# Patient Record
Sex: MALE | Race: WHITE | Employment: OTHER | URBAN - NONMETROPOLITAN AREA
[De-identification: names, ages, dates, MRNs, and addresses within clinical notes are randomized per-mention and may not be internally consistent; named-entity substitution may affect disease eponyms.]

---

## 2017-01-14 PROBLEM — K63.2 COLOCUTANEOUS FISTULA: Status: ACTIVE | Noted: 2017-01-14

## 2017-01-14 PROBLEM — L02.211 ABDOMINAL WALL ABSCESS: Status: ACTIVE | Noted: 2017-01-14

## 2017-01-14 PROBLEM — D64.9 ANEMIA: Chronic | Status: ACTIVE | Noted: 2017-01-14

## 2017-01-14 PROBLEM — R53.81 PHYSICAL DECONDITIONING: Chronic | Status: ACTIVE | Noted: 2017-01-14

## 2017-01-17 PROBLEM — R41.0 CONFUSION: Status: ACTIVE | Noted: 2017-01-17

## 2017-01-17 PROBLEM — G47.00 INSOMNIA: Status: ACTIVE | Noted: 2017-01-17

## 2017-01-17 PROBLEM — I25.10 CORONARY ARTERY DISEASE INVOLVING NATIVE CORONARY ARTERY OF NATIVE HEART: Chronic | Status: ACTIVE | Noted: 2017-01-17

## 2017-01-17 PROBLEM — K56.7 ILEUS (HCC): Status: ACTIVE | Noted: 2017-01-17

## 2017-01-17 PROBLEM — E83.39 HYPOPHOSPHATEMIA: Status: ACTIVE | Noted: 2017-01-17

## 2017-01-17 PROBLEM — N17.9 ACUTE KIDNEY INJURY (HCC): Status: ACTIVE | Noted: 2017-01-17

## 2017-01-17 PROBLEM — I10 ESSENTIAL HYPERTENSION: Chronic | Status: ACTIVE | Noted: 2017-01-17

## 2017-01-17 PROBLEM — G93.1 ANOXIC BRAIN INJURY (HCC): Chronic | Status: ACTIVE | Noted: 2017-01-17

## 2017-01-17 PROBLEM — E87.1 HYPONATREMIA: Status: ACTIVE | Noted: 2017-01-17

## 2017-01-17 PROBLEM — I50.32 DIASTOLIC DYSFUNCTION WITH CHRONIC HEART FAILURE (HCC): Chronic | Status: ACTIVE | Noted: 2017-01-17

## 2017-01-24 PROBLEM — G93.1 ANOXIC BRAIN INJURY (HCC): Chronic | Status: RESOLVED | Noted: 2017-01-17 | Resolved: 2017-01-24

## 2017-01-25 PROBLEM — F03.90 DEMENTIA (HCC): Status: ACTIVE | Noted: 2017-01-25

## 2017-01-25 PROBLEM — R45.1 AGITATION: Status: ACTIVE | Noted: 2017-01-25

## 2017-02-02 ENCOUNTER — TELEPHONE (OUTPATIENT)
Age: 82
End: 2017-02-02

## 2017-02-09 ENCOUNTER — OFFICE VISIT (OUTPATIENT)
Dept: UROLOGY | Age: 82
End: 2017-02-09

## 2017-02-09 ENCOUNTER — TELEPHONE (OUTPATIENT)
Dept: UROLOGY | Age: 82
End: 2017-02-09

## 2017-02-09 VITALS — BODY MASS INDEX: 27.6 KG/M2 | WEIGHT: 150 LBS | HEIGHT: 62 IN

## 2017-02-09 DIAGNOSIS — R33.9 RETENTION OF URINE: Primary | ICD-10-CM

## 2017-02-09 PROCEDURE — 99214 OFFICE O/P EST MOD 30 MIN: CPT | Performed by: UROLOGY

## 2017-02-09 PROCEDURE — 51700 IRRIGATION OF BLADDER: CPT | Performed by: UROLOGY

## 2017-02-09 ASSESSMENT — ENCOUNTER SYMPTOMS
EYE PAIN: 0
FACIAL SWELLING: 0
COLOR CHANGE: 0
VOMITING: 0
BACK PAIN: 0
CONSTIPATION: 0
EYE REDNESS: 0
SHORTNESS OF BREATH: 0
CHEST TIGHTNESS: 0

## 2017-02-20 ENCOUNTER — TELEPHONE (OUTPATIENT)
Age: 82
End: 2017-02-20

## 2017-02-21 ENCOUNTER — OFFICE VISIT (OUTPATIENT)
Age: 82
End: 2017-02-21

## 2017-02-21 VITALS
DIASTOLIC BLOOD PRESSURE: 68 MMHG | WEIGHT: 152 LBS | RESPIRATION RATE: 16 BRPM | BODY MASS INDEX: 27.97 KG/M2 | TEMPERATURE: 97.2 F | HEIGHT: 62 IN | SYSTOLIC BLOOD PRESSURE: 112 MMHG | HEART RATE: 72 BPM | OXYGEN SATURATION: 96 %

## 2017-02-21 DIAGNOSIS — D50.0 IRON DEFICIENCY ANEMIA DUE TO CHRONIC BLOOD LOSS: Primary | ICD-10-CM

## 2017-02-21 DIAGNOSIS — Z98.890 POST-OPERATIVE STATE: ICD-10-CM

## 2017-02-21 DIAGNOSIS — K57.40 DIVERTICULITIS OF BOTH SMALL AND LARGE INTESTINE WITH PERFORATION AND ABSCESS: Primary | ICD-10-CM

## 2017-02-21 PROCEDURE — 99024 POSTOP FOLLOW-UP VISIT: CPT | Performed by: SURGERY

## 2017-02-21 RX ORDER — CIPROFLOXACIN 250 MG/1
250 TABLET, FILM COATED ORAL 2 TIMES DAILY
Qty: 20 TABLET | Refills: 0 | Status: SHIPPED | OUTPATIENT
Start: 2017-02-21 | End: 2017-02-21 | Stop reason: CLARIF

## 2017-03-26 PROBLEM — A41.9 SEPSIS (HCC): Status: ACTIVE | Noted: 2017-03-26

## 2017-03-26 PROBLEM — I21.4 NSTEMI (NON-ST ELEVATED MYOCARDIAL INFARCTION) (HCC): Status: ACTIVE | Noted: 2017-03-26

## 2017-03-26 PROBLEM — K65.9 PERITONITIS (HCC): Status: ACTIVE | Noted: 2017-03-26

## 2017-03-26 PROBLEM — R65.10 SIRS (SYSTEMIC INFLAMMATORY RESPONSE SYNDROME) (HCC): Status: ACTIVE | Noted: 2017-03-26

## 2017-03-26 PROBLEM — N41.9 PROSTATITIS: Status: ACTIVE | Noted: 2017-03-26

## 2017-03-26 PROBLEM — R07.9 CHEST PAIN: Status: ACTIVE | Noted: 2017-03-26

## 2017-04-04 PROBLEM — K65.9 PERITONITIS (HCC): Status: RESOLVED | Noted: 2017-03-26 | Resolved: 2017-04-04

## 2017-04-06 PROBLEM — R65.20 SEVERE SEPSIS (HCC): Status: ACTIVE | Noted: 2017-03-26

## 2018-06-02 ENCOUNTER — APPOINTMENT (OUTPATIENT)
Dept: CT IMAGING | Age: 83
DRG: 563 | End: 2018-06-02
Payer: MEDICARE

## 2018-06-02 ENCOUNTER — HOSPITAL ENCOUNTER (INPATIENT)
Age: 83
LOS: 2 days | Discharge: NURSING, REHAB OR LONG-TERM CARE | DRG: 563 | End: 2018-06-07
Attending: INTERNAL MEDICINE | Admitting: INTERNAL MEDICINE
Payer: MEDICARE

## 2018-06-02 ENCOUNTER — APPOINTMENT (OUTPATIENT)
Dept: GENERAL RADIOLOGY | Age: 83
DRG: 563 | End: 2018-06-02
Payer: MEDICARE

## 2018-06-02 DIAGNOSIS — S42.211A CLOSED DISPLACED FRACTURE OF SURGICAL NECK OF RIGHT HUMERUS, UNSPECIFIED FRACTURE MORPHOLOGY, INITIAL ENCOUNTER: Primary | ICD-10-CM

## 2018-06-02 PROBLEM — S42.291A HUMERUS HEAD FRACTURE, RIGHT, CLOSED, INITIAL ENCOUNTER: Status: ACTIVE | Noted: 2018-06-02

## 2018-06-02 LAB
AMORPHOUS: ABNORMAL
BACTERIA: ABNORMAL /HPF
BASOPHILS # BLD: 0.6 %
BASOPHILS ABSOLUTE: 0.1 THOU/MM3 (ref 0–0.1)
BILIRUBIN URINE: NEGATIVE
BLOOD, URINE: ABNORMAL
CASTS UA: ABNORMAL /LPF
CHARACTER, URINE: CLEAR
COLOR: YELLOW
CRYSTALS, UA: ABNORMAL
EOSINOPHIL # BLD: 0 %
EOSINOPHILS ABSOLUTE: 0 THOU/MM3 (ref 0–0.4)
EPITHELIAL CELLS, UA: ABNORMAL /HPF
GLUCOSE URINE: NEGATIVE MG/DL
HCT VFR BLD CALC: 39 % (ref 42–52)
HEMOGLOBIN: 12.9 GM/DL (ref 14–18)
KETONES, URINE: NEGATIVE
LEUKOCYTE ESTERASE, URINE: NEGATIVE
LYMPHOCYTES # BLD: 16.7 %
LYMPHOCYTES ABSOLUTE: 1.6 THOU/MM3 (ref 1–4.8)
MCH RBC QN AUTO: 28.9 PG (ref 27–31)
MCHC RBC AUTO-ENTMCNC: 33.1 GM/DL (ref 33–37)
MCV RBC AUTO: 87.3 FL (ref 80–94)
MONOCYTES # BLD: 5.4 %
MONOCYTES ABSOLUTE: 0.5 THOU/MM3 (ref 0.4–1.3)
MUCUS: ABNORMAL
NITRITE, URINE: NEGATIVE
NUCLEATED RED BLOOD CELLS: 0 /100 WBC
PDW BLD-RTO: 13 % (ref 11.5–14.5)
PH UA: 6
PLATELET # BLD: 189 THOU/MM3 (ref 130–400)
PLATELET ESTIMATE: ADEQUATE
PMV BLD AUTO: 8.6 FL (ref 7.4–10.4)
PROTEIN UA: 100
RBC # BLD: 4.46 MILL/MM3 (ref 4.7–6.1)
RBC URINE: ABNORMAL /HPF
SCAN OF BLOOD SMEAR: NORMAL
SEG NEUTROPHILS: 77.3 %
SEGMENTED NEUTROPHILS ABSOLUTE COUNT: 7.2 THOU/MM3 (ref 1.8–7.7)
SPECIFIC GRAVITY, URINE: 1.01 (ref 1–1.03)
UROBILINOGEN, URINE: 0.2 EU/DL
WBC # BLD: 9.3 THOU/MM3 (ref 4.8–10.8)
WBC UA: ABNORMAL /HPF

## 2018-06-02 PROCEDURE — 99285 EMERGENCY DEPT VISIT HI MDM: CPT

## 2018-06-02 PROCEDURE — 73030 X-RAY EXAM OF SHOULDER: CPT

## 2018-06-02 PROCEDURE — 96372 THER/PROPH/DIAG INJ SC/IM: CPT

## 2018-06-02 PROCEDURE — 6370000000 HC RX 637 (ALT 250 FOR IP): Performed by: INTERNAL MEDICINE

## 2018-06-02 PROCEDURE — G0378 HOSPITAL OBSERVATION PER HR: HCPCS

## 2018-06-02 PROCEDURE — 85025 COMPLETE CBC W/AUTO DIFF WBC: CPT

## 2018-06-02 PROCEDURE — 81001 URINALYSIS AUTO W/SCOPE: CPT

## 2018-06-02 PROCEDURE — 99220 PR INITIAL OBSERVATION CARE/DAY 70 MINUTES: CPT | Performed by: INTERNAL MEDICINE

## 2018-06-02 PROCEDURE — 6820000001 HC L2 TRAUMA SURGERY EVALUATION

## 2018-06-02 PROCEDURE — 36415 COLL VENOUS BLD VENIPUNCTURE: CPT

## 2018-06-02 PROCEDURE — 6360000002 HC RX W HCPCS: Performed by: INTERNAL MEDICINE

## 2018-06-02 RX ORDER — OMEGA-3-ACID ETHYL ESTERS 1 G/1
1 CAPSULE, LIQUID FILLED ORAL DAILY
Status: DISCONTINUED | OUTPATIENT
Start: 2018-06-03 | End: 2018-06-07 | Stop reason: HOSPADM

## 2018-06-02 RX ORDER — HYOSCYAMINE SULFATE 0.125 MG
125 TABLET,DISINTEGRATING ORAL EVERY 4 HOURS PRN
Status: DISCONTINUED | OUTPATIENT
Start: 2018-06-02 | End: 2018-06-07 | Stop reason: HOSPADM

## 2018-06-02 RX ORDER — DOXAZOSIN MESYLATE 4 MG/1
4 TABLET ORAL DAILY
Status: CANCELLED | OUTPATIENT
Start: 2018-06-02

## 2018-06-02 RX ORDER — MORPHINE SULFATE 2 MG/ML
2 INJECTION, SOLUTION INTRAMUSCULAR; INTRAVENOUS ONCE
Status: DISCONTINUED | OUTPATIENT
Start: 2018-06-02 | End: 2018-06-02

## 2018-06-02 RX ORDER — TRAZODONE HYDROCHLORIDE 50 MG/1
50 TABLET ORAL NIGHTLY
Status: DISCONTINUED | OUTPATIENT
Start: 2018-06-02 | End: 2018-06-07 | Stop reason: HOSPADM

## 2018-06-02 RX ORDER — SODIUM CHLORIDE 0.9 % (FLUSH) 0.9 %
10 SYRINGE (ML) INJECTION PRN
Status: DISCONTINUED | OUTPATIENT
Start: 2018-06-02 | End: 2018-06-07 | Stop reason: HOSPADM

## 2018-06-02 RX ORDER — PANTOPRAZOLE SODIUM 40 MG/1
40 TABLET, DELAYED RELEASE ORAL
Status: DISCONTINUED | OUTPATIENT
Start: 2018-06-02 | End: 2018-06-07 | Stop reason: HOSPADM

## 2018-06-02 RX ORDER — FINASTERIDE 5 MG/1
5 TABLET, FILM COATED ORAL DAILY
Status: DISCONTINUED | OUTPATIENT
Start: 2018-06-02 | End: 2018-06-07 | Stop reason: HOSPADM

## 2018-06-02 RX ORDER — TRAMADOL HYDROCHLORIDE 50 MG/1
50 TABLET ORAL EVERY 6 HOURS PRN
Status: DISCONTINUED | OUTPATIENT
Start: 2018-06-02 | End: 2018-06-07 | Stop reason: HOSPADM

## 2018-06-02 RX ORDER — PRAVASTATIN SODIUM 80 MG/1
80 TABLET ORAL NIGHTLY
Status: DISCONTINUED | OUTPATIENT
Start: 2018-06-02 | End: 2018-06-07 | Stop reason: HOSPADM

## 2018-06-02 RX ORDER — DOXAZOSIN MESYLATE 4 MG/1
4 TABLET ORAL NIGHTLY
Status: DISCONTINUED | OUTPATIENT
Start: 2018-06-02 | End: 2018-06-07 | Stop reason: HOSPADM

## 2018-06-02 RX ORDER — ACETAMINOPHEN 325 MG/1
650 TABLET ORAL EVERY 4 HOURS PRN
Status: DISCONTINUED | OUTPATIENT
Start: 2018-06-02 | End: 2018-06-07 | Stop reason: HOSPADM

## 2018-06-02 RX ORDER — MORPHINE SULFATE 2 MG/ML
2 INJECTION, SOLUTION INTRAMUSCULAR; INTRAVENOUS ONCE
Status: COMPLETED | OUTPATIENT
Start: 2018-06-02 | End: 2018-06-02

## 2018-06-02 RX ORDER — FERROUS SULFATE 325(65) MG
325 TABLET ORAL 2 TIMES DAILY
Status: DISCONTINUED | OUTPATIENT
Start: 2018-06-02 | End: 2018-06-07 | Stop reason: HOSPADM

## 2018-06-02 RX ORDER — CLOPIDOGREL BISULFATE 75 MG/1
75 TABLET ORAL DAILY
Status: DISCONTINUED | OUTPATIENT
Start: 2018-06-02 | End: 2018-06-04

## 2018-06-02 RX ORDER — ASPIRIN 81 MG/1
81 TABLET ORAL DAILY
Status: DISCONTINUED | OUTPATIENT
Start: 2018-06-02 | End: 2018-06-07 | Stop reason: HOSPADM

## 2018-06-02 RX ORDER — TERAZOSIN 5 MG/1
5 CAPSULE ORAL NIGHTLY
Status: ON HOLD | COMMUNITY
End: 2018-07-03 | Stop reason: HOSPADM

## 2018-06-02 RX ORDER — DOCUSATE SODIUM 100 MG/1
100 CAPSULE, LIQUID FILLED ORAL 2 TIMES DAILY
Status: DISCONTINUED | OUTPATIENT
Start: 2018-06-02 | End: 2018-06-07 | Stop reason: HOSPADM

## 2018-06-02 RX ORDER — DONEPEZIL HYDROCHLORIDE 10 MG/1
10 TABLET, FILM COATED ORAL NIGHTLY
Status: DISCONTINUED | OUTPATIENT
Start: 2018-06-02 | End: 2018-06-07 | Stop reason: HOSPADM

## 2018-06-02 RX ORDER — POTASSIUM CHLORIDE 750 MG/1
10 CAPSULE, EXTENDED RELEASE ORAL DAILY
Status: ON HOLD | COMMUNITY
End: 2018-07-03 | Stop reason: HOSPADM

## 2018-06-02 RX ORDER — ASCORBIC ACID 500 MG
500 TABLET ORAL DAILY
Status: DISCONTINUED | OUTPATIENT
Start: 2018-06-02 | End: 2018-06-07 | Stop reason: HOSPADM

## 2018-06-02 RX ORDER — MORPHINE SULFATE 2 MG/ML
2 INJECTION, SOLUTION INTRAMUSCULAR; INTRAVENOUS
Status: COMPLETED | OUTPATIENT
Start: 2018-06-02 | End: 2018-06-02

## 2018-06-02 RX ORDER — HYDROCHLOROTHIAZIDE 25 MG/1
12.5 TABLET ORAL DAILY
Status: DISCONTINUED | OUTPATIENT
Start: 2018-06-02 | End: 2018-06-04

## 2018-06-02 RX ORDER — SODIUM CHLORIDE 0.9 % (FLUSH) 0.9 %
10 SYRINGE (ML) INJECTION EVERY 12 HOURS SCHEDULED
Status: DISCONTINUED | OUTPATIENT
Start: 2018-06-02 | End: 2018-06-07 | Stop reason: HOSPADM

## 2018-06-02 RX ORDER — ONDANSETRON 2 MG/ML
4 INJECTION INTRAMUSCULAR; INTRAVENOUS ONCE
Status: DISCONTINUED | OUTPATIENT
Start: 2018-06-02 | End: 2018-06-02

## 2018-06-02 RX ORDER — FLUTICASONE PROPIONATE 50 MCG
1 SPRAY, SUSPENSION (ML) NASAL DAILY
Status: DISCONTINUED | OUTPATIENT
Start: 2018-06-02 | End: 2018-06-07 | Stop reason: HOSPADM

## 2018-06-02 RX ORDER — NITROGLYCERIN 0.4 MG/1
0.4 TABLET SUBLINGUAL EVERY 5 MIN PRN
Status: DISCONTINUED | OUTPATIENT
Start: 2018-06-02 | End: 2018-06-07 | Stop reason: HOSPADM

## 2018-06-02 RX ORDER — GUAIFENESIN 600 MG/1
600 TABLET, EXTENDED RELEASE ORAL 2 TIMES DAILY
Status: DISCONTINUED | OUTPATIENT
Start: 2018-06-02 | End: 2018-06-07 | Stop reason: HOSPADM

## 2018-06-02 RX ORDER — MORPHINE SULFATE 2 MG/ML
2 INJECTION, SOLUTION INTRAMUSCULAR; INTRAVENOUS EVERY 4 HOURS PRN
Status: DISCONTINUED | OUTPATIENT
Start: 2018-06-02 | End: 2018-06-07 | Stop reason: HOSPADM

## 2018-06-02 RX ORDER — LIDOCAINE 50 MG/G
1 PATCH TOPICAL EVERY 24 HOURS
Status: DISCONTINUED | OUTPATIENT
Start: 2018-06-02 | End: 2018-06-07 | Stop reason: HOSPADM

## 2018-06-02 RX ORDER — ACETAMINOPHEN 325 MG/1
650 TABLET ORAL EVERY 6 HOURS PRN
Status: DISCONTINUED | OUTPATIENT
Start: 2018-06-02 | End: 2018-06-02 | Stop reason: SDUPTHER

## 2018-06-02 RX ORDER — HYDROCHLOROTHIAZIDE 25 MG/1
25 TABLET ORAL DAILY
Status: DISCONTINUED | OUTPATIENT
Start: 2018-06-02 | End: 2018-06-02

## 2018-06-02 RX ORDER — ONDANSETRON 4 MG/1
4 TABLET, ORALLY DISINTEGRATING ORAL ONCE
Status: DISCONTINUED | OUTPATIENT
Start: 2018-06-02 | End: 2018-06-02

## 2018-06-02 RX ORDER — MULTIVITAMIN WITH FOLIC ACID 400 MCG
1 TABLET ORAL DAILY
Status: DISCONTINUED | OUTPATIENT
Start: 2018-06-02 | End: 2018-06-07 | Stop reason: HOSPADM

## 2018-06-02 RX ORDER — ONDANSETRON 2 MG/ML
4 INJECTION INTRAMUSCULAR; INTRAVENOUS EVERY 6 HOURS PRN
Status: DISCONTINUED | OUTPATIENT
Start: 2018-06-02 | End: 2018-06-07 | Stop reason: HOSPADM

## 2018-06-02 RX ADMIN — MORPHINE SULFATE 2 MG: 2 INJECTION, SOLUTION INTRAMUSCULAR; INTRAVENOUS at 20:59

## 2018-06-02 RX ADMIN — PRAVASTATIN SODIUM 80 MG: 80 TABLET ORAL at 23:08

## 2018-06-02 RX ADMIN — DOCUSATE SODIUM 100 MG: 100 CAPSULE, LIQUID FILLED ORAL at 23:08

## 2018-06-02 RX ADMIN — TRAMADOL HYDROCHLORIDE 50 MG: 50 TABLET, FILM COATED ORAL at 15:35

## 2018-06-02 RX ADMIN — DONEPEZIL HYDROCHLORIDE 10 MG: 10 TABLET, FILM COATED ORAL at 23:08

## 2018-06-02 RX ADMIN — GUAIFENESIN 600 MG: 600 TABLET, EXTENDED RELEASE ORAL at 23:21

## 2018-06-02 RX ADMIN — TRAMADOL HYDROCHLORIDE 50 MG: 50 TABLET, FILM COATED ORAL at 23:25

## 2018-06-02 RX ADMIN — ENOXAPARIN SODIUM 40 MG: 40 INJECTION, SOLUTION INTRAVENOUS; SUBCUTANEOUS at 23:13

## 2018-06-02 RX ADMIN — Medication 25 MG: at 23:08

## 2018-06-02 RX ADMIN — MORPHINE SULFATE 2 MG: 2 INJECTION, SOLUTION INTRAMUSCULAR; INTRAVENOUS at 12:44

## 2018-06-02 RX ADMIN — FERROUS SULFATE TAB 325 MG (65 MG ELEMENTAL FE) 325 MG: 325 (65 FE) TAB at 23:08

## 2018-06-02 RX ADMIN — TRAZODONE HYDROCHLORIDE 50 MG: 50 TABLET ORAL at 23:08

## 2018-06-02 RX ADMIN — ACETAMINOPHEN 650 MG: 325 TABLET ORAL at 17:53

## 2018-06-02 RX ADMIN — ACETAMINOPHEN 650 MG: 325 TABLET ORAL at 23:25

## 2018-06-02 ASSESSMENT — PAIN DESCRIPTION - PAIN TYPE
TYPE: ACUTE PAIN

## 2018-06-02 ASSESSMENT — ENCOUNTER SYMPTOMS
COUGH: 0
SHORTNESS OF BREATH: 0
BACK PAIN: 0
WHEEZING: 0
ABDOMINAL PAIN: 0
DIARRHEA: 0
NAUSEA: 0
CONSTIPATION: 0
BLOOD IN STOOL: 0
VOMITING: 0
CHEST TIGHTNESS: 0
SORE THROAT: 0
RHINORRHEA: 0

## 2018-06-02 ASSESSMENT — PAIN DESCRIPTION - DESCRIPTORS
DESCRIPTORS: ACHING;SHARP
DESCRIPTORS: SHARP;ACHING;CONSTANT
DESCRIPTORS: ACHING;DISCOMFORT;CONSTANT

## 2018-06-02 ASSESSMENT — PAIN DESCRIPTION - ONSET
ONSET: SUDDEN
ONSET: SUDDEN

## 2018-06-02 ASSESSMENT — PAIN DESCRIPTION - LOCATION
LOCATION: SHOULDER

## 2018-06-02 ASSESSMENT — PAIN SCALES - GENERAL
PAINLEVEL_OUTOF10: 5
PAINLEVEL_OUTOF10: 9
PAINLEVEL_OUTOF10: 5
PAINLEVEL_OUTOF10: 8
PAINLEVEL_OUTOF10: 8
PAINLEVEL_OUTOF10: 6
PAINLEVEL_OUTOF10: 7
PAINLEVEL_OUTOF10: 9
PAINLEVEL_OUTOF10: 8

## 2018-06-02 ASSESSMENT — PAIN DESCRIPTION - ORIENTATION
ORIENTATION: RIGHT

## 2018-06-02 ASSESSMENT — PAIN DESCRIPTION - PROGRESSION
CLINICAL_PROGRESSION: GRADUALLY IMPROVING
CLINICAL_PROGRESSION: NOT CHANGED
CLINICAL_PROGRESSION: GRADUALLY IMPROVING

## 2018-06-02 ASSESSMENT — PAIN DESCRIPTION - FREQUENCY
FREQUENCY: CONTINUOUS

## 2018-06-03 LAB
ANION GAP SERPL CALCULATED.3IONS-SCNC: 10 MEQ/L (ref 8–16)
BASOPHILS # BLD: 0.3 %
BASOPHILS ABSOLUTE: 0 THOU/MM3 (ref 0–0.1)
BUN BLDV-MCNC: 34 MG/DL (ref 7–22)
CALCIUM SERPL-MCNC: 8.6 MG/DL (ref 8.5–10.5)
CHLORIDE BLD-SCNC: 104 MEQ/L (ref 98–111)
CO2: 18 MEQ/L (ref 23–33)
CREAT SERPL-MCNC: 1.2 MG/DL (ref 0.4–1.2)
EOSINOPHIL # BLD: 0 %
EOSINOPHILS ABSOLUTE: 0 THOU/MM3 (ref 0–0.4)
GFR SERPL CREATININE-BSD FRML MDRD: 58 ML/MIN/1.73M2
GLUCOSE BLD-MCNC: 135 MG/DL (ref 70–108)
HCT VFR BLD CALC: 35.3 % (ref 42–52)
HEMOGLOBIN: 11.9 GM/DL (ref 14–18)
LYMPHOCYTES # BLD: 21.8 %
LYMPHOCYTES ABSOLUTE: 1.8 THOU/MM3 (ref 1–4.8)
MCH RBC QN AUTO: 29.7 PG (ref 27–31)
MCHC RBC AUTO-ENTMCNC: 33.8 GM/DL (ref 33–37)
MCV RBC AUTO: 87.9 FL (ref 80–94)
MONOCYTES # BLD: 5.1 %
MONOCYTES ABSOLUTE: 0.4 THOU/MM3 (ref 0.4–1.3)
NUCLEATED RED BLOOD CELLS: 0 /100 WBC
PDW BLD-RTO: 13.6 % (ref 11.5–14.5)
PLATELET # BLD: 192 THOU/MM3 (ref 130–400)
PMV BLD AUTO: 7.1 FL (ref 7.4–10.4)
POTASSIUM REFLEX MAGNESIUM: 4.6 MEQ/L (ref 3.5–5.2)
RBC # BLD: 4.02 MILL/MM3 (ref 4.7–6.1)
SEG NEUTROPHILS: 72.8 %
SEGMENTED NEUTROPHILS ABSOLUTE COUNT: 5.9 THOU/MM3 (ref 1.8–7.7)
SODIUM BLD-SCNC: 132 MEQ/L (ref 135–145)
WBC # BLD: 8.1 THOU/MM3 (ref 4.8–10.8)

## 2018-06-03 PROCEDURE — 6370000000 HC RX 637 (ALT 250 FOR IP): Performed by: INTERNAL MEDICINE

## 2018-06-03 PROCEDURE — G8988 SELF CARE GOAL STATUS: HCPCS

## 2018-06-03 PROCEDURE — 96372 THER/PROPH/DIAG INJ SC/IM: CPT

## 2018-06-03 PROCEDURE — G8987 SELF CARE CURRENT STATUS: HCPCS

## 2018-06-03 PROCEDURE — G8978 MOBILITY CURRENT STATUS: HCPCS

## 2018-06-03 PROCEDURE — 97530 THERAPEUTIC ACTIVITIES: CPT

## 2018-06-03 PROCEDURE — 97535 SELF CARE MNGMENT TRAINING: CPT

## 2018-06-03 PROCEDURE — 85025 COMPLETE CBC W/AUTO DIFF WBC: CPT

## 2018-06-03 PROCEDURE — G0378 HOSPITAL OBSERVATION PER HR: HCPCS

## 2018-06-03 PROCEDURE — 97163 PT EVAL HIGH COMPLEX 45 MIN: CPT

## 2018-06-03 PROCEDURE — 99232 SBSQ HOSP IP/OBS MODERATE 35: CPT | Performed by: INTERNAL MEDICINE

## 2018-06-03 PROCEDURE — 6370000000 HC RX 637 (ALT 250 FOR IP): Performed by: FAMILY MEDICINE

## 2018-06-03 PROCEDURE — 97110 THERAPEUTIC EXERCISES: CPT

## 2018-06-03 PROCEDURE — G8979 MOBILITY GOAL STATUS: HCPCS

## 2018-06-03 PROCEDURE — 80048 BASIC METABOLIC PNL TOTAL CA: CPT

## 2018-06-03 PROCEDURE — 97166 OT EVAL MOD COMPLEX 45 MIN: CPT

## 2018-06-03 PROCEDURE — 6370000000 HC RX 637 (ALT 250 FOR IP): Performed by: PHYSICIAN ASSISTANT

## 2018-06-03 PROCEDURE — 36415 COLL VENOUS BLD VENIPUNCTURE: CPT

## 2018-06-03 PROCEDURE — 6360000002 HC RX W HCPCS: Performed by: INTERNAL MEDICINE

## 2018-06-03 RX ORDER — HYDROCODONE BITARTRATE AND ACETAMINOPHEN 5; 325 MG/1; MG/1
2 TABLET ORAL EVERY 6 HOURS PRN
Status: DISCONTINUED | OUTPATIENT
Start: 2018-06-03 | End: 2018-06-07 | Stop reason: HOSPADM

## 2018-06-03 RX ORDER — HYDROCODONE BITARTRATE AND ACETAMINOPHEN 5; 325 MG/1; MG/1
1 TABLET ORAL EVERY 6 HOURS PRN
Status: DISCONTINUED | OUTPATIENT
Start: 2018-06-03 | End: 2018-06-07 | Stop reason: HOSPADM

## 2018-06-03 RX ADMIN — Medication 25 MG: at 09:29

## 2018-06-03 RX ADMIN — DOXAZOSIN 4 MG: 4 TABLET ORAL at 19:58

## 2018-06-03 RX ADMIN — ENOXAPARIN SODIUM 40 MG: 40 INJECTION, SOLUTION INTRAVENOUS; SUBCUTANEOUS at 19:59

## 2018-06-03 RX ADMIN — OMEGA-3-ACID ETHYL ESTERS 1 CAPSULE: 1 CAPSULE, LIQUID FILLED ORAL at 09:29

## 2018-06-03 RX ADMIN — ASPIRIN 81 MG: 81 TABLET ORAL at 09:33

## 2018-06-03 RX ADMIN — DONEPEZIL HYDROCHLORIDE 10 MG: 10 TABLET, FILM COATED ORAL at 19:59

## 2018-06-03 RX ADMIN — TRAMADOL HYDROCHLORIDE 50 MG: 50 TABLET, FILM COATED ORAL at 12:11

## 2018-06-03 RX ADMIN — GUAIFENESIN 600 MG: 600 TABLET, EXTENDED RELEASE ORAL at 09:29

## 2018-06-03 RX ADMIN — DOCUSATE SODIUM 100 MG: 100 CAPSULE, LIQUID FILLED ORAL at 09:29

## 2018-06-03 RX ADMIN — Medication 25 MG: at 20:01

## 2018-06-03 RX ADMIN — ACETAMINOPHEN 650 MG: 325 TABLET ORAL at 08:51

## 2018-06-03 RX ADMIN — FERROUS SULFATE TAB 325 MG (65 MG ELEMENTAL FE) 325 MG: 325 (65 FE) TAB at 09:29

## 2018-06-03 RX ADMIN — OXYCODONE HYDROCHLORIDE AND ACETAMINOPHEN 500 MG: 500 TABLET ORAL at 09:29

## 2018-06-03 RX ADMIN — Medication 1 TABLET: at 09:29

## 2018-06-03 RX ADMIN — HYDROCODONE BITARTRATE AND ACETAMINOPHEN 1 TABLET: 5; 325 TABLET ORAL at 17:14

## 2018-06-03 RX ADMIN — PRAVASTATIN SODIUM 80 MG: 80 TABLET ORAL at 20:01

## 2018-06-03 RX ADMIN — DOCUSATE SODIUM 100 MG: 100 CAPSULE, LIQUID FILLED ORAL at 19:58

## 2018-06-03 RX ADMIN — TRAZODONE HYDROCHLORIDE 50 MG: 50 TABLET ORAL at 19:58

## 2018-06-03 RX ADMIN — ACETAMINOPHEN 650 MG: 325 TABLET ORAL at 04:02

## 2018-06-03 RX ADMIN — FERROUS SULFATE TAB 325 MG (65 MG ELEMENTAL FE) 325 MG: 325 (65 FE) TAB at 19:58

## 2018-06-03 RX ADMIN — HYDROCHLOROTHIAZIDE 12.5 MG: 25 TABLET ORAL at 09:29

## 2018-06-03 RX ADMIN — TRAMADOL HYDROCHLORIDE 50 MG: 50 TABLET, FILM COATED ORAL at 19:58

## 2018-06-03 RX ADMIN — FINASTERIDE 5 MG: 5 TABLET, FILM COATED ORAL at 09:29

## 2018-06-03 ASSESSMENT — PAIN DESCRIPTION - PAIN TYPE
TYPE: ACUTE PAIN

## 2018-06-03 ASSESSMENT — PAIN DESCRIPTION - ORIENTATION
ORIENTATION: RIGHT

## 2018-06-03 ASSESSMENT — PAIN DESCRIPTION - DESCRIPTORS
DESCRIPTORS: ACHING;CONSTANT
DESCRIPTORS: ACHING;CONSTANT
DESCRIPTORS: ACHING

## 2018-06-03 ASSESSMENT — PAIN DESCRIPTION - LOCATION
LOCATION: SHOULDER

## 2018-06-03 ASSESSMENT — PAIN SCALES - GENERAL
PAINLEVEL_OUTOF10: 8
PAINLEVEL_OUTOF10: 5
PAINLEVEL_OUTOF10: 10
PAINLEVEL_OUTOF10: 8
PAINLEVEL_OUTOF10: 6

## 2018-06-03 ASSESSMENT — PAIN SCALES - WONG BAKER: WONGBAKER_NUMERICALRESPONSE: 8

## 2018-06-03 ASSESSMENT — PAIN DESCRIPTION - ONSET
ONSET: SUDDEN
ONSET: SUDDEN

## 2018-06-03 ASSESSMENT — PAIN DESCRIPTION - FREQUENCY
FREQUENCY: CONTINUOUS
FREQUENCY: CONTINUOUS

## 2018-06-04 LAB
ANION GAP SERPL CALCULATED.3IONS-SCNC: 16 MEQ/L (ref 8–16)
BUN BLDV-MCNC: 42 MG/DL (ref 7–22)
CALCIUM SERPL-MCNC: 9.1 MG/DL (ref 8.5–10.5)
CHLORIDE BLD-SCNC: 101 MEQ/L (ref 98–111)
CO2: 20 MEQ/L (ref 23–33)
CREAT SERPL-MCNC: 1.4 MG/DL (ref 0.4–1.2)
GFR SERPL CREATININE-BSD FRML MDRD: 48 ML/MIN/1.73M2
GLUCOSE BLD-MCNC: 162 MG/DL (ref 70–108)
POTASSIUM SERPL-SCNC: 4.7 MEQ/L (ref 3.5–5.2)
SODIUM BLD-SCNC: 137 MEQ/L (ref 135–145)

## 2018-06-04 PROCEDURE — 80048 BASIC METABOLIC PNL TOTAL CA: CPT

## 2018-06-04 PROCEDURE — 6370000000 HC RX 637 (ALT 250 FOR IP): Performed by: PHYSICIAN ASSISTANT

## 2018-06-04 PROCEDURE — 6360000002 HC RX W HCPCS: Performed by: INTERNAL MEDICINE

## 2018-06-04 PROCEDURE — 97110 THERAPEUTIC EXERCISES: CPT

## 2018-06-04 PROCEDURE — 97530 THERAPEUTIC ACTIVITIES: CPT

## 2018-06-04 PROCEDURE — 36415 COLL VENOUS BLD VENIPUNCTURE: CPT

## 2018-06-04 PROCEDURE — 97116 GAIT TRAINING THERAPY: CPT

## 2018-06-04 PROCEDURE — 96372 THER/PROPH/DIAG INJ SC/IM: CPT

## 2018-06-04 PROCEDURE — 6370000000 HC RX 637 (ALT 250 FOR IP): Performed by: INTERNAL MEDICINE

## 2018-06-04 PROCEDURE — 97535 SELF CARE MNGMENT TRAINING: CPT

## 2018-06-04 PROCEDURE — G0378 HOSPITAL OBSERVATION PER HR: HCPCS

## 2018-06-04 PROCEDURE — 6370000000 HC RX 637 (ALT 250 FOR IP): Performed by: FAMILY MEDICINE

## 2018-06-04 PROCEDURE — 99232 SBSQ HOSP IP/OBS MODERATE 35: CPT | Performed by: INTERNAL MEDICINE

## 2018-06-04 RX ADMIN — PANTOPRAZOLE SODIUM 40 MG: 40 TABLET, DELAYED RELEASE ORAL at 15:59

## 2018-06-04 RX ADMIN — HYDROCODONE BITARTRATE AND ACETAMINOPHEN 2 TABLET: 5; 325 TABLET ORAL at 06:13

## 2018-06-04 RX ADMIN — FINASTERIDE 5 MG: 5 TABLET, FILM COATED ORAL at 09:37

## 2018-06-04 RX ADMIN — PANTOPRAZOLE SODIUM 40 MG: 40 TABLET, DELAYED RELEASE ORAL at 06:14

## 2018-06-04 RX ADMIN — ENOXAPARIN SODIUM 40 MG: 40 INJECTION, SOLUTION INTRAVENOUS; SUBCUTANEOUS at 21:36

## 2018-06-04 RX ADMIN — HYDROCODONE BITARTRATE AND ACETAMINOPHEN 2 TABLET: 5; 325 TABLET ORAL at 00:12

## 2018-06-04 RX ADMIN — HYDROCHLOROTHIAZIDE 12.5 MG: 25 TABLET ORAL at 09:37

## 2018-06-04 RX ADMIN — HYDROCODONE BITARTRATE AND ACETAMINOPHEN 2 TABLET: 5; 325 TABLET ORAL at 15:59

## 2018-06-04 RX ADMIN — HYDROCODONE BITARTRATE AND ACETAMINOPHEN 2 TABLET: 5; 325 TABLET ORAL at 23:59

## 2018-06-04 RX ADMIN — Medication 25 MG: at 09:32

## 2018-06-04 RX ADMIN — ASPIRIN 81 MG: 81 TABLET ORAL at 09:32

## 2018-06-04 ASSESSMENT — PAIN SCALES - GENERAL
PAINLEVEL_OUTOF10: 7
PAINLEVEL_OUTOF10: 9
PAINLEVEL_OUTOF10: 7
PAINLEVEL_OUTOF10: 10
PAINLEVEL_OUTOF10: 5

## 2018-06-04 ASSESSMENT — PAIN SCALES - WONG BAKER
WONGBAKER_NUMERICALRESPONSE: 4
WONGBAKER_NUMERICALRESPONSE: 6
WONGBAKER_NUMERICALRESPONSE: 2

## 2018-06-04 ASSESSMENT — PAIN SCALES - PAIN ASSESSMENT IN ADVANCED DEMENTIA (PAINAD)
BODYLANGUAGE: 0
NEGVOCALIZATION: 1
TOTALSCORE: 2
BREATHING: 0
FACIALEXPRESSION: 1
CONSOLABILITY: 0

## 2018-06-04 ASSESSMENT — PAIN DESCRIPTION - ORIENTATION: ORIENTATION: RIGHT

## 2018-06-04 ASSESSMENT — PAIN DESCRIPTION - LOCATION: LOCATION: SHOULDER

## 2018-06-04 ASSESSMENT — PAIN DESCRIPTION - PAIN TYPE: TYPE: ACUTE PAIN

## 2018-06-05 PROBLEM — N17.9 ACUTE RENAL FAILURE (ARF) (HCC): Status: ACTIVE | Noted: 2018-06-05

## 2018-06-05 LAB
ANION GAP SERPL CALCULATED.3IONS-SCNC: 18 MEQ/L (ref 8–16)
BUN BLDV-MCNC: 45 MG/DL (ref 7–22)
CALCIUM SERPL-MCNC: 9.1 MG/DL (ref 8.5–10.5)
CHLORIDE BLD-SCNC: 103 MEQ/L (ref 98–111)
CO2: 18 MEQ/L (ref 23–33)
CREAT SERPL-MCNC: 1.6 MG/DL (ref 0.4–1.2)
GFR SERPL CREATININE-BSD FRML MDRD: 41 ML/MIN/1.73M2
GLUCOSE BLD-MCNC: 125 MG/DL (ref 70–108)
POTASSIUM SERPL-SCNC: 4.7 MEQ/L (ref 3.5–5.2)
SODIUM BLD-SCNC: 139 MEQ/L (ref 135–145)
TOTAL CK: 116 U/L (ref 55–170)

## 2018-06-05 PROCEDURE — 6370000000 HC RX 637 (ALT 250 FOR IP): Performed by: ORTHOPAEDIC SURGERY

## 2018-06-05 PROCEDURE — 2580000003 HC RX 258: Performed by: INTERNAL MEDICINE

## 2018-06-05 PROCEDURE — 51798 US URINE CAPACITY MEASURE: CPT

## 2018-06-05 PROCEDURE — 80048 BASIC METABOLIC PNL TOTAL CA: CPT

## 2018-06-05 PROCEDURE — 99232 SBSQ HOSP IP/OBS MODERATE 35: CPT | Performed by: INTERNAL MEDICINE

## 2018-06-05 PROCEDURE — 6370000000 HC RX 637 (ALT 250 FOR IP): Performed by: PHYSICIAN ASSISTANT

## 2018-06-05 PROCEDURE — 97530 THERAPEUTIC ACTIVITIES: CPT

## 2018-06-05 PROCEDURE — 36415 COLL VENOUS BLD VENIPUNCTURE: CPT

## 2018-06-05 PROCEDURE — 1200000000 HC SEMI PRIVATE

## 2018-06-05 PROCEDURE — 6370000000 HC RX 637 (ALT 250 FOR IP): Performed by: INTERNAL MEDICINE

## 2018-06-05 PROCEDURE — 6360000002 HC RX W HCPCS: Performed by: INTERNAL MEDICINE

## 2018-06-05 PROCEDURE — 82550 ASSAY OF CK (CPK): CPT

## 2018-06-05 RX ORDER — HYDROXYZINE PAMOATE 25 MG/1
25 CAPSULE ORAL EVERY 4 HOURS PRN
Status: DISCONTINUED | OUTPATIENT
Start: 2018-06-05 | End: 2018-06-05

## 2018-06-05 RX ORDER — SODIUM CHLORIDE 9 MG/ML
INJECTION, SOLUTION INTRAVENOUS CONTINUOUS
Status: ACTIVE | OUTPATIENT
Start: 2018-06-05 | End: 2018-06-07

## 2018-06-05 RX ADMIN — HYDROCODONE BITARTRATE AND ACETAMINOPHEN 2 TABLET: 5; 325 TABLET ORAL at 19:14

## 2018-06-05 RX ADMIN — HYDROXYZINE PAMOATE 25 MG: 25 CAPSULE ORAL at 02:07

## 2018-06-05 RX ADMIN — PRAVASTATIN SODIUM 80 MG: 80 TABLET ORAL at 20:00

## 2018-06-05 RX ADMIN — HYDROCODONE BITARTRATE AND ACETAMINOPHEN 2 TABLET: 5; 325 TABLET ORAL at 13:01

## 2018-06-05 ASSESSMENT — PAIN SCALES - PAIN ASSESSMENT IN ADVANCED DEMENTIA (PAINAD)
NEGVOCALIZATION: 1
BREATHING: 0
BODYLANGUAGE: 0
TOTALSCORE: 2
BREATHING: 0
NEGVOCALIZATION: 1
NEGVOCALIZATION: 1
CONSOLABILITY: 0
BODYLANGUAGE: 0
BREATHING: 0
FACIALEXPRESSION: 1
BODYLANGUAGE: 0
FACIALEXPRESSION: 1
CONSOLABILITY: 0
BREATHING: 0
BODYLANGUAGE: 0
BREATHING: 0
TOTALSCORE: 2
FACIALEXPRESSION: 1
NEGVOCALIZATION: 1
TOTALSCORE: 2
CONSOLABILITY: 0
FACIALEXPRESSION: 1
FACIALEXPRESSION: 1
BODYLANGUAGE: 0
FACIALEXPRESSION: 1
BREATHING: 0
TOTALSCORE: 2
CONSOLABILITY: 0
BODYLANGUAGE: 0
TOTALSCORE: 2
FACIALEXPRESSION: 1
FACIALEXPRESSION: 1
BODYLANGUAGE: 0
TOTALSCORE: 2
CONSOLABILITY: 0
BREATHING: 0
CONSOLABILITY: 0
TOTALSCORE: 2
NEGVOCALIZATION: 1
CONSOLABILITY: 0
NEGVOCALIZATION: 1
NEGVOCALIZATION: 1
TOTALSCORE: 2
NEGVOCALIZATION: 1
FACIALEXPRESSION: 1
NEGVOCALIZATION: 1
BREATHING: 0
BODYLANGUAGE: 0
TOTALSCORE: 2
CONSOLABILITY: 0
BREATHING: 0
BODYLANGUAGE: 0
CONSOLABILITY: 0
FACIALEXPRESSION: 1
NEGVOCALIZATION: 1
TOTALSCORE: 2
CONSOLABILITY: 0
CONSOLABILITY: 0
FACIALEXPRESSION: 1
BODYLANGUAGE: 0
NEGVOCALIZATION: 1
BODYLANGUAGE: 0
BODYLANGUAGE: 0
FACIALEXPRESSION: 1
BREATHING: 0
FACIALEXPRESSION: 1
TOTALSCORE: 2
CONSOLABILITY: 0
BREATHING: 0
BODYLANGUAGE: 0
CONSOLABILITY: 0

## 2018-06-05 ASSESSMENT — PAIN SCALES - GENERAL
PAINLEVEL_OUTOF10: 0
PAINLEVEL_OUTOF10: 7
PAINLEVEL_OUTOF10: 0

## 2018-06-06 LAB
ANION GAP SERPL CALCULATED.3IONS-SCNC: 17 MEQ/L (ref 8–16)
BUN BLDV-MCNC: 55 MG/DL (ref 7–22)
CALCIUM SERPL-MCNC: 9.1 MG/DL (ref 8.5–10.5)
CHLORIDE BLD-SCNC: 102 MEQ/L (ref 98–111)
CO2: 18 MEQ/L (ref 23–33)
CREAT SERPL-MCNC: 1.6 MG/DL (ref 0.4–1.2)
GFR SERPL CREATININE-BSD FRML MDRD: 41 ML/MIN/1.73M2
GLUCOSE BLD-MCNC: 118 MG/DL (ref 70–108)
HCT VFR BLD CALC: 29.8 % (ref 42–52)
HEMOGLOBIN: 10.7 GM/DL (ref 14–18)
MCH RBC QN AUTO: 30.6 PG (ref 27–31)
MCHC RBC AUTO-ENTMCNC: 35.9 GM/DL (ref 33–37)
MCV RBC AUTO: 85.2 FL (ref 80–94)
PDW BLD-RTO: 13.3 % (ref 11.5–14.5)
PLATELET # BLD: 173 THOU/MM3 (ref 130–400)
PMV BLD AUTO: 7.3 FL (ref 7.4–10.4)
POTASSIUM SERPL-SCNC: 4.8 MEQ/L (ref 3.5–5.2)
RBC # BLD: 3.5 MILL/MM3 (ref 4.7–6.1)
SODIUM BLD-SCNC: 137 MEQ/L (ref 135–145)
SODIUM URINE: 51 MEQ/L
WBC # BLD: 5.6 THOU/MM3 (ref 4.8–10.8)

## 2018-06-06 PROCEDURE — 6370000000 HC RX 637 (ALT 250 FOR IP): Performed by: PHYSICIAN ASSISTANT

## 2018-06-06 PROCEDURE — 85027 COMPLETE CBC AUTOMATED: CPT

## 2018-06-06 PROCEDURE — 36415 COLL VENOUS BLD VENIPUNCTURE: CPT

## 2018-06-06 PROCEDURE — 6360000002 HC RX W HCPCS: Performed by: INTERNAL MEDICINE

## 2018-06-06 PROCEDURE — 97530 THERAPEUTIC ACTIVITIES: CPT

## 2018-06-06 PROCEDURE — 97116 GAIT TRAINING THERAPY: CPT

## 2018-06-06 PROCEDURE — 6370000000 HC RX 637 (ALT 250 FOR IP): Performed by: INTERNAL MEDICINE

## 2018-06-06 PROCEDURE — 99232 SBSQ HOSP IP/OBS MODERATE 35: CPT | Performed by: INTERNAL MEDICINE

## 2018-06-06 PROCEDURE — 80048 BASIC METABOLIC PNL TOTAL CA: CPT

## 2018-06-06 PROCEDURE — 97110 THERAPEUTIC EXERCISES: CPT

## 2018-06-06 PROCEDURE — 51798 US URINE CAPACITY MEASURE: CPT

## 2018-06-06 PROCEDURE — 1200000000 HC SEMI PRIVATE

## 2018-06-06 PROCEDURE — 84300 ASSAY OF URINE SODIUM: CPT

## 2018-06-06 RX ADMIN — DONEPEZIL HYDROCHLORIDE 10 MG: 10 TABLET, FILM COATED ORAL at 00:15

## 2018-06-06 RX ADMIN — PRAVASTATIN SODIUM 80 MG: 80 TABLET ORAL at 22:42

## 2018-06-06 RX ADMIN — ENOXAPARIN SODIUM 40 MG: 40 INJECTION, SOLUTION INTRAVENOUS; SUBCUTANEOUS at 00:15

## 2018-06-06 RX ADMIN — Medication 25 MG: at 09:24

## 2018-06-06 RX ADMIN — GUAIFENESIN 600 MG: 600 TABLET, EXTENDED RELEASE ORAL at 00:15

## 2018-06-06 RX ADMIN — ASPIRIN 81 MG: 81 TABLET ORAL at 11:44

## 2018-06-06 RX ADMIN — FERROUS SULFATE TAB 325 MG (65 MG ELEMENTAL FE) 325 MG: 325 (65 FE) TAB at 22:39

## 2018-06-06 RX ADMIN — ACETAMINOPHEN 650 MG: 325 TABLET ORAL at 17:18

## 2018-06-06 RX ADMIN — DONEPEZIL HYDROCHLORIDE 10 MG: 10 TABLET, FILM COATED ORAL at 22:42

## 2018-06-06 RX ADMIN — TRAMADOL HYDROCHLORIDE 50 MG: 50 TABLET, FILM COATED ORAL at 00:13

## 2018-06-06 RX ADMIN — DOCUSATE SODIUM 100 MG: 100 CAPSULE, LIQUID FILLED ORAL at 00:15

## 2018-06-06 RX ADMIN — ENOXAPARIN SODIUM 40 MG: 40 INJECTION, SOLUTION INTRAVENOUS; SUBCUTANEOUS at 22:39

## 2018-06-06 RX ADMIN — Medication 1 TABLET: at 09:27

## 2018-06-06 RX ADMIN — Medication 25 MG: at 00:15

## 2018-06-06 RX ADMIN — DOXAZOSIN 4 MG: 4 TABLET ORAL at 00:15

## 2018-06-06 RX ADMIN — GUAIFENESIN 600 MG: 600 TABLET, EXTENDED RELEASE ORAL at 09:38

## 2018-06-06 RX ADMIN — FERROUS SULFATE TAB 325 MG (65 MG ELEMENTAL FE) 325 MG: 325 (65 FE) TAB at 09:23

## 2018-06-06 RX ADMIN — DOCUSATE SODIUM 100 MG: 100 CAPSULE, LIQUID FILLED ORAL at 09:22

## 2018-06-06 RX ADMIN — HYDROCODONE BITARTRATE AND ACETAMINOPHEN 1 TABLET: 5; 325 TABLET ORAL at 03:57

## 2018-06-06 RX ADMIN — ACETAMINOPHEN 650 MG: 325 TABLET ORAL at 22:39

## 2018-06-06 RX ADMIN — DOXAZOSIN 4 MG: 4 TABLET ORAL at 22:40

## 2018-06-06 RX ADMIN — FINASTERIDE 5 MG: 5 TABLET, FILM COATED ORAL at 09:38

## 2018-06-06 RX ADMIN — TRAZODONE HYDROCHLORIDE 50 MG: 50 TABLET ORAL at 00:15

## 2018-06-06 RX ADMIN — ACETAMINOPHEN 650 MG: 325 TABLET ORAL at 09:22

## 2018-06-06 RX ADMIN — PANTOPRAZOLE SODIUM 40 MG: 40 TABLET, DELAYED RELEASE ORAL at 17:18

## 2018-06-06 RX ADMIN — FERROUS SULFATE TAB 325 MG (65 MG ELEMENTAL FE) 325 MG: 325 (65 FE) TAB at 00:15

## 2018-06-06 RX ADMIN — OXYCODONE HYDROCHLORIDE AND ACETAMINOPHEN 500 MG: 500 TABLET ORAL at 09:25

## 2018-06-06 RX ADMIN — TRAMADOL HYDROCHLORIDE 50 MG: 50 TABLET, FILM COATED ORAL at 17:19

## 2018-06-06 ASSESSMENT — PAIN SCALES - GENERAL
PAINLEVEL_OUTOF10: 4
PAINLEVEL_OUTOF10: 3
PAINLEVEL_OUTOF10: 4
PAINLEVEL_OUTOF10: 4
PAINLEVEL_OUTOF10: 3
PAINLEVEL_OUTOF10: 1
PAINLEVEL_OUTOF10: 3
PAINLEVEL_OUTOF10: 6
PAINLEVEL_OUTOF10: 4
PAINLEVEL_OUTOF10: 5

## 2018-06-06 ASSESSMENT — PAIN SCALES - PAIN ASSESSMENT IN ADVANCED DEMENTIA (PAINAD)
TOTALSCORE: 2
BREATHING: 0
BODYLANGUAGE: 0
NEGVOCALIZATION: 1
CONSOLABILITY: 0
FACIALEXPRESSION: 1

## 2018-06-06 ASSESSMENT — PAIN DESCRIPTION - LOCATION
LOCATION: SHOULDER

## 2018-06-06 ASSESSMENT — PAIN DESCRIPTION - DESCRIPTORS
DESCRIPTORS: ACHING
DESCRIPTORS: ACHING;DULL

## 2018-06-06 ASSESSMENT — PAIN DESCRIPTION - ORIENTATION
ORIENTATION: RIGHT

## 2018-06-06 ASSESSMENT — PAIN DESCRIPTION - PAIN TYPE
TYPE: ACUTE PAIN

## 2018-06-06 ASSESSMENT — PAIN DESCRIPTION - FREQUENCY: FREQUENCY: CONTINUOUS

## 2018-06-07 ENCOUNTER — HOSPITAL ENCOUNTER (INPATIENT)
Age: 83
LOS: 26 days | Discharge: HOME HEALTH | DRG: 560 | End: 2018-07-03
Attending: FAMILY MEDICINE | Admitting: FAMILY MEDICINE
Payer: MEDICARE

## 2018-06-07 VITALS
DIASTOLIC BLOOD PRESSURE: 62 MMHG | TEMPERATURE: 98.4 F | OXYGEN SATURATION: 97 % | BODY MASS INDEX: 27.6 KG/M2 | SYSTOLIC BLOOD PRESSURE: 133 MMHG | RESPIRATION RATE: 16 BRPM | HEIGHT: 62 IN | HEART RATE: 88 BPM | WEIGHT: 150 LBS

## 2018-06-07 DIAGNOSIS — R53.81 PHYSICAL DEBILITY: ICD-10-CM

## 2018-06-07 DIAGNOSIS — F02.80 DEMENTIA DUE TO MEDICAL CONDITION WITHOUT BEHAVIORAL DISTURBANCE (HCC): ICD-10-CM

## 2018-06-07 DIAGNOSIS — G93.1 ANOXIC BRAIN DAMAGE (HCC): Chronic | ICD-10-CM

## 2018-06-07 DIAGNOSIS — Z86.73 HISTORY OF CVA (CEREBROVASCULAR ACCIDENT): ICD-10-CM

## 2018-06-07 DIAGNOSIS — S42.291A HUMERUS HEAD FRACTURE, RIGHT, CLOSED, INITIAL ENCOUNTER: Primary | ICD-10-CM

## 2018-06-07 DIAGNOSIS — G81.94 LEFT HEMIPARESIS (HCC): ICD-10-CM

## 2018-06-07 PROBLEM — N18.30 CKD (CHRONIC KIDNEY DISEASE) STAGE 3, GFR 30-59 ML/MIN (HCC): Status: ACTIVE | Noted: 2018-06-07

## 2018-06-07 PROBLEM — E66.3 OVERWEIGHT (BMI 25.0-29.9): Status: ACTIVE | Noted: 2018-06-07

## 2018-06-07 LAB
ANION GAP SERPL CALCULATED.3IONS-SCNC: 14 MEQ/L (ref 8–16)
BUN BLDV-MCNC: 55 MG/DL (ref 7–22)
CALCIUM SERPL-MCNC: 9 MG/DL (ref 8.5–10.5)
CHLORIDE BLD-SCNC: 101 MEQ/L (ref 98–111)
CO2: 24 MEQ/L (ref 23–33)
CREAT SERPL-MCNC: 1.5 MG/DL (ref 0.4–1.2)
GFR SERPL CREATININE-BSD FRML MDRD: 45 ML/MIN/1.73M2
GLUCOSE BLD-MCNC: 111 MG/DL (ref 70–108)
POTASSIUM SERPL-SCNC: 4.3 MEQ/L (ref 3.5–5.2)
SODIUM BLD-SCNC: 139 MEQ/L (ref 135–145)

## 2018-06-07 PROCEDURE — 6370000000 HC RX 637 (ALT 250 FOR IP): Performed by: INTERNAL MEDICINE

## 2018-06-07 PROCEDURE — 36415 COLL VENOUS BLD VENIPUNCTURE: CPT

## 2018-06-07 PROCEDURE — A5120 SKIN BARRIER, WIPE OR SWAB: HCPCS

## 2018-06-07 PROCEDURE — 80048 BASIC METABOLIC PNL TOTAL CA: CPT

## 2018-06-07 PROCEDURE — 6370000000 HC RX 637 (ALT 250 FOR IP): Performed by: FAMILY MEDICINE

## 2018-06-07 PROCEDURE — 97116 GAIT TRAINING THERAPY: CPT

## 2018-06-07 PROCEDURE — 0220000000 HC SKILLED NURSING FACILITY

## 2018-06-07 PROCEDURE — 97530 THERAPEUTIC ACTIVITIES: CPT

## 2018-06-07 PROCEDURE — 99238 HOSP IP/OBS DSCHRG MGMT 30/<: CPT | Performed by: INTERNAL MEDICINE

## 2018-06-07 PROCEDURE — 6360000002 HC RX W HCPCS: Performed by: INTERNAL MEDICINE

## 2018-06-07 PROCEDURE — 1290000000 HC SEMI PRIVATE OTHER R&B

## 2018-06-07 PROCEDURE — 97535 SELF CARE MNGMENT TRAINING: CPT

## 2018-06-07 PROCEDURE — 97110 THERAPEUTIC EXERCISES: CPT

## 2018-06-07 RX ORDER — SENNA PLUS 8.6 MG/1
1 TABLET ORAL NIGHTLY PRN
Status: DISCONTINUED | OUTPATIENT
Start: 2018-06-08 | End: 2018-07-03 | Stop reason: HOSPADM

## 2018-06-07 RX ORDER — TRAZODONE HYDROCHLORIDE 50 MG/1
50 TABLET ORAL NIGHTLY
Status: CANCELLED | OUTPATIENT
Start: 2018-06-07

## 2018-06-07 RX ORDER — HYDROCODONE BITARTRATE AND ACETAMINOPHEN 5; 325 MG/1; MG/1
2 TABLET ORAL EVERY 6 HOURS PRN
Status: CANCELLED | OUTPATIENT
Start: 2018-06-07

## 2018-06-07 RX ORDER — HYOSCYAMINE SULFATE 0.125 MG
125 TABLET,DISINTEGRATING ORAL EVERY 4 HOURS PRN
Status: DISCONTINUED | OUTPATIENT
Start: 2018-06-07 | End: 2018-07-03 | Stop reason: HOSPADM

## 2018-06-07 RX ORDER — ACETAMINOPHEN 500 MG
1000 TABLET ORAL EVERY 6 HOURS
Status: DISCONTINUED | OUTPATIENT
Start: 2018-06-07 | End: 2018-07-03 | Stop reason: HOSPADM

## 2018-06-07 RX ORDER — DONEPEZIL HYDROCHLORIDE 10 MG/1
10 TABLET, FILM COATED ORAL NIGHTLY
Status: DISCONTINUED | OUTPATIENT
Start: 2018-06-07 | End: 2018-06-08

## 2018-06-07 RX ORDER — HYOSCYAMINE SULFATE 0.125 MG
125 TABLET,DISINTEGRATING ORAL EVERY 4 HOURS PRN
Status: CANCELLED | OUTPATIENT
Start: 2018-06-07

## 2018-06-07 RX ORDER — GUAIFENESIN 600 MG/1
600 TABLET, EXTENDED RELEASE ORAL 2 TIMES DAILY
Status: DISCONTINUED | OUTPATIENT
Start: 2018-06-07 | End: 2018-07-03 | Stop reason: HOSPADM

## 2018-06-07 RX ORDER — FERROUS SULFATE 325(65) MG
325 TABLET ORAL 2 TIMES DAILY
Status: DISCONTINUED | OUTPATIENT
Start: 2018-06-07 | End: 2018-07-03 | Stop reason: HOSPADM

## 2018-06-07 RX ORDER — FLUTICASONE PROPIONATE 50 MCG
1 SPRAY, SUSPENSION (ML) NASAL DAILY
Status: CANCELLED | OUTPATIENT
Start: 2018-06-08

## 2018-06-07 RX ORDER — PRAVASTATIN SODIUM 80 MG/1
80 TABLET ORAL NIGHTLY
Status: DISCONTINUED | OUTPATIENT
Start: 2018-06-07 | End: 2018-07-03 | Stop reason: HOSPADM

## 2018-06-07 RX ORDER — OMEGA-3-ACID ETHYL ESTERS 1 G/1
1 CAPSULE, LIQUID FILLED ORAL DAILY
Status: CANCELLED | OUTPATIENT
Start: 2018-06-08

## 2018-06-07 RX ORDER — LIDOCAINE 50 MG/G
1 PATCH TOPICAL EVERY 24 HOURS
Status: CANCELLED | OUTPATIENT
Start: 2018-06-07

## 2018-06-07 RX ORDER — ASPIRIN 81 MG/1
81 TABLET ORAL DAILY
Status: DISCONTINUED | OUTPATIENT
Start: 2018-06-08 | End: 2018-07-03 | Stop reason: HOSPADM

## 2018-06-07 RX ORDER — FERROUS SULFATE 325(65) MG
325 TABLET ORAL 2 TIMES DAILY
Status: CANCELLED | OUTPATIENT
Start: 2018-06-07

## 2018-06-07 RX ORDER — FINASTERIDE 5 MG/1
5 TABLET, FILM COATED ORAL DAILY
Status: CANCELLED | OUTPATIENT
Start: 2018-06-08

## 2018-06-07 RX ORDER — ACETAMINOPHEN 325 MG/1
650 TABLET ORAL EVERY 4 HOURS PRN
Status: CANCELLED | OUTPATIENT
Start: 2018-06-07

## 2018-06-07 RX ORDER — ASCORBIC ACID 500 MG
500 TABLET ORAL DAILY
Status: DISCONTINUED | OUTPATIENT
Start: 2018-06-08 | End: 2018-07-03 | Stop reason: HOSPADM

## 2018-06-07 RX ORDER — OMEGA-3-ACID ETHYL ESTERS 1 G/1
1 CAPSULE, LIQUID FILLED ORAL DAILY
Status: DISCONTINUED | OUTPATIENT
Start: 2018-06-08 | End: 2018-07-03 | Stop reason: HOSPADM

## 2018-06-07 RX ORDER — GUAIFENESIN 600 MG/1
600 TABLET, EXTENDED RELEASE ORAL 2 TIMES DAILY
Status: CANCELLED | OUTPATIENT
Start: 2018-06-07

## 2018-06-07 RX ORDER — ACETAMINOPHEN 325 MG/1
650 TABLET ORAL EVERY 4 HOURS PRN
Status: DISCONTINUED | OUTPATIENT
Start: 2018-06-07 | End: 2018-06-07

## 2018-06-07 RX ORDER — PANTOPRAZOLE SODIUM 40 MG/1
40 TABLET, DELAYED RELEASE ORAL
Status: DISCONTINUED | OUTPATIENT
Start: 2018-06-07 | End: 2018-07-03 | Stop reason: HOSPADM

## 2018-06-07 RX ORDER — ASPIRIN 81 MG/1
81 TABLET ORAL DAILY
Status: CANCELLED | OUTPATIENT
Start: 2018-06-08

## 2018-06-07 RX ORDER — PRAVASTATIN SODIUM 80 MG/1
80 TABLET ORAL NIGHTLY
Status: CANCELLED | OUTPATIENT
Start: 2018-06-07

## 2018-06-07 RX ORDER — HYDROCODONE BITARTRATE AND ACETAMINOPHEN 5; 325 MG/1; MG/1
1 TABLET ORAL EVERY 6 HOURS PRN
Status: DISCONTINUED | OUTPATIENT
Start: 2018-06-07 | End: 2018-06-07

## 2018-06-07 RX ORDER — TRAMADOL HYDROCHLORIDE 50 MG/1
50 TABLET ORAL EVERY 6 HOURS PRN
Status: CANCELLED | OUTPATIENT
Start: 2018-06-07

## 2018-06-07 RX ORDER — DOXAZOSIN MESYLATE 4 MG/1
4 TABLET ORAL NIGHTLY
Status: CANCELLED | OUTPATIENT
Start: 2018-06-07

## 2018-06-07 RX ORDER — DOXAZOSIN MESYLATE 4 MG/1
4 TABLET ORAL NIGHTLY
Status: DISCONTINUED | OUTPATIENT
Start: 2018-06-07 | End: 2018-06-30

## 2018-06-07 RX ORDER — PANTOPRAZOLE SODIUM 40 MG/1
40 TABLET, DELAYED RELEASE ORAL
Status: CANCELLED | OUTPATIENT
Start: 2018-06-07

## 2018-06-07 RX ORDER — DOCUSATE SODIUM 100 MG/1
100 CAPSULE, LIQUID FILLED ORAL 2 TIMES DAILY
Status: DISCONTINUED | OUTPATIENT
Start: 2018-06-07 | End: 2018-07-03 | Stop reason: HOSPADM

## 2018-06-07 RX ORDER — ASCORBIC ACID 500 MG
500 TABLET ORAL DAILY
Status: CANCELLED | OUTPATIENT
Start: 2018-06-08

## 2018-06-07 RX ORDER — FLUTICASONE PROPIONATE 50 MCG
1 SPRAY, SUSPENSION (ML) NASAL DAILY
Status: DISCONTINUED | OUTPATIENT
Start: 2018-06-08 | End: 2018-07-03 | Stop reason: HOSPADM

## 2018-06-07 RX ORDER — TRAZODONE HYDROCHLORIDE 50 MG/1
50 TABLET ORAL NIGHTLY
Status: DISCONTINUED | OUTPATIENT
Start: 2018-06-07 | End: 2018-06-08

## 2018-06-07 RX ORDER — DOCUSATE SODIUM 100 MG/1
100 CAPSULE, LIQUID FILLED ORAL 2 TIMES DAILY
Status: CANCELLED | OUTPATIENT
Start: 2018-06-07

## 2018-06-07 RX ORDER — TRAMADOL HYDROCHLORIDE 50 MG/1
50 TABLET ORAL EVERY 6 HOURS PRN
Status: DISCONTINUED | OUTPATIENT
Start: 2018-06-07 | End: 2018-06-07

## 2018-06-07 RX ORDER — HYDROCODONE BITARTRATE AND ACETAMINOPHEN 5; 325 MG/1; MG/1
1 TABLET ORAL EVERY 6 HOURS PRN
Status: CANCELLED | OUTPATIENT
Start: 2018-06-07

## 2018-06-07 RX ORDER — DONEPEZIL HYDROCHLORIDE 10 MG/1
10 TABLET, FILM COATED ORAL NIGHTLY
Status: CANCELLED | OUTPATIENT
Start: 2018-06-07

## 2018-06-07 RX ORDER — FINASTERIDE 5 MG/1
5 TABLET, FILM COATED ORAL DAILY
Status: DISCONTINUED | OUTPATIENT
Start: 2018-06-08 | End: 2018-07-03 | Stop reason: HOSPADM

## 2018-06-07 RX ORDER — HYDROCODONE BITARTRATE AND ACETAMINOPHEN 5; 325 MG/1; MG/1
2 TABLET ORAL EVERY 6 HOURS PRN
Status: DISCONTINUED | OUTPATIENT
Start: 2018-06-07 | End: 2018-06-07

## 2018-06-07 RX ORDER — POLYETHYLENE GLYCOL 3350 17 G/17G
17 POWDER, FOR SOLUTION ORAL DAILY PRN
Status: DISCONTINUED | OUTPATIENT
Start: 2018-06-07 | End: 2018-07-03 | Stop reason: HOSPADM

## 2018-06-07 RX ORDER — LIDOCAINE 50 MG/G
1 PATCH TOPICAL EVERY 24 HOURS
Status: DISCONTINUED | OUTPATIENT
Start: 2018-06-07 | End: 2018-07-03 | Stop reason: HOSPADM

## 2018-06-07 RX ADMIN — FINASTERIDE 5 MG: 5 TABLET, FILM COATED ORAL at 08:47

## 2018-06-07 RX ADMIN — FERROUS SULFATE TAB 325 MG (65 MG ELEMENTAL FE) 325 MG: 325 (65 FE) TAB at 08:47

## 2018-06-07 RX ADMIN — DOCUSATE SODIUM 100 MG: 100 CAPSULE, LIQUID FILLED ORAL at 08:47

## 2018-06-07 RX ADMIN — OXYCODONE HYDROCHLORIDE AND ACETAMINOPHEN 500 MG: 500 TABLET ORAL at 08:47

## 2018-06-07 RX ADMIN — DONEPEZIL HYDROCHLORIDE 10 MG: 10 TABLET, FILM COATED ORAL at 22:53

## 2018-06-07 RX ADMIN — Medication 1 TABLET: at 08:51

## 2018-06-07 RX ADMIN — METOPROLOL TARTRATE 25 MG: 25 TABLET ORAL at 22:53

## 2018-06-07 RX ADMIN — ACETAMINOPHEN 650 MG: 325 TABLET ORAL at 17:35

## 2018-06-07 RX ADMIN — ACETAMINOPHEN 1000 MG: 500 TABLET ORAL at 22:52

## 2018-06-07 RX ADMIN — DOXAZOSIN 4 MG: 4 TABLET ORAL at 22:52

## 2018-06-07 RX ADMIN — TRAZODONE HYDROCHLORIDE 50 MG: 50 TABLET ORAL at 22:53

## 2018-06-07 RX ADMIN — PANTOPRAZOLE SODIUM 40 MG: 40 TABLET, DELAYED RELEASE ORAL at 22:53

## 2018-06-07 RX ADMIN — FLUTICASONE PROPIONATE 1 SPRAY: 50 SPRAY, METERED NASAL at 08:47

## 2018-06-07 RX ADMIN — PRAVASTATIN SODIUM 80 MG: 80 TABLET ORAL at 22:53

## 2018-06-07 RX ADMIN — PANTOPRAZOLE SODIUM 40 MG: 40 TABLET, DELAYED RELEASE ORAL at 08:47

## 2018-06-07 RX ADMIN — ACETAMINOPHEN 650 MG: 325 TABLET ORAL at 05:46

## 2018-06-07 RX ADMIN — GUAIFENESIN 600 MG: 600 TABLET, EXTENDED RELEASE ORAL at 22:53

## 2018-06-07 RX ADMIN — ASPIRIN 81 MG: 81 TABLET ORAL at 08:47

## 2018-06-07 RX ADMIN — GUAIFENESIN 600 MG: 600 TABLET, EXTENDED RELEASE ORAL at 08:51

## 2018-06-07 RX ADMIN — ENOXAPARIN SODIUM 40 MG: 40 INJECTION, SOLUTION INTRAVENOUS; SUBCUTANEOUS at 22:54

## 2018-06-07 ASSESSMENT — PAIN SCALES - PAIN ASSESSMENT IN ADVANCED DEMENTIA (PAINAD)
CONSOLABILITY: 0
BODYLANGUAGE: 0
NEGVOCALIZATION: 1
FACIALEXPRESSION: 1
CONSOLABILITY: 0
CONSOLABILITY: 0
BREATHING: 0
TOTALSCORE: 2
BODYLANGUAGE: 0
BREATHING: 0
FACIALEXPRESSION: 1
BREATHING: 0
CONSOLABILITY: 0
CONSOLABILITY: 0
TOTALSCORE: 2
BODYLANGUAGE: 0
NEGVOCALIZATION: 1
NEGVOCALIZATION: 1
TOTALSCORE: 2
BODYLANGUAGE: 0
CONSOLABILITY: 0
BREATHING: 0
NEGVOCALIZATION: 1
CONSOLABILITY: 0
FACIALEXPRESSION: 1
BREATHING: 0
BREATHING: 0
TOTALSCORE: 2
FACIALEXPRESSION: 1
BREATHING: 0
BODYLANGUAGE: 0
FACIALEXPRESSION: 1
NEGVOCALIZATION: 1
FACIALEXPRESSION: 1
TOTALSCORE: 2
BODYLANGUAGE: 0
BODYLANGUAGE: 0
TOTALSCORE: 2
FACIALEXPRESSION: 1
TOTALSCORE: 2
NEGVOCALIZATION: 1
BODYLANGUAGE: 0
TOTALSCORE: 2
FACIALEXPRESSION: 1
NEGVOCALIZATION: 1
NEGVOCALIZATION: 1
BREATHING: 0
CONSOLABILITY: 0

## 2018-06-07 ASSESSMENT — PAIN SCALES - GENERAL
PAINLEVEL_OUTOF10: 5
PAINLEVEL_OUTOF10: 3
PAINLEVEL_OUTOF10: 6
PAINLEVEL_OUTOF10: 6
PAINLEVEL_OUTOF10: 5
PAINLEVEL_OUTOF10: 6
PAINLEVEL_OUTOF10: 8
PAINLEVEL_OUTOF10: 5

## 2018-06-07 ASSESSMENT — PAIN DESCRIPTION - PROGRESSION
CLINICAL_PROGRESSION: NOT CHANGED

## 2018-06-07 ASSESSMENT — PAIN DESCRIPTION - PAIN TYPE
TYPE: ACUTE PAIN
TYPE: ACUTE PAIN

## 2018-06-07 ASSESSMENT — PAIN DESCRIPTION - LOCATION
LOCATION: SHOULDER

## 2018-06-07 ASSESSMENT — PAIN DESCRIPTION - ORIENTATION
ORIENTATION: RIGHT

## 2018-06-07 ASSESSMENT — PAIN DESCRIPTION - FREQUENCY: FREQUENCY: CONTINUOUS

## 2018-06-07 ASSESSMENT — PAIN DESCRIPTION - ONSET: ONSET: PROGRESSIVE

## 2018-06-07 ASSESSMENT — PAIN DESCRIPTION - DESCRIPTORS: DESCRIPTORS: ACHING

## 2018-06-07 ASSESSMENT — PAIN SCALES - WONG BAKER: WONGBAKER_NUMERICALRESPONSE: 8

## 2018-06-08 PROBLEM — F02.80 DEMENTIA DUE TO MEDICAL CONDITION WITHOUT BEHAVIORAL DISTURBANCE (HCC): Status: ACTIVE | Noted: 2017-01-25

## 2018-06-08 PROBLEM — N18.9 ACUTE KIDNEY INJURY SUPERIMPOSED ON CHRONIC KIDNEY DISEASE (HCC): Status: ACTIVE | Noted: 2017-01-17

## 2018-06-08 PROBLEM — G47.01 INSOMNIA DUE TO MEDICAL CONDITION: Status: ACTIVE | Noted: 2017-01-17

## 2018-06-08 PROCEDURE — 6370000000 HC RX 637 (ALT 250 FOR IP): Performed by: INTERNAL MEDICINE

## 2018-06-08 PROCEDURE — 97163 PT EVAL HIGH COMPLEX 45 MIN: CPT

## 2018-06-08 PROCEDURE — 1290000000 HC SEMI PRIVATE OTHER R&B

## 2018-06-08 PROCEDURE — 97535 SELF CARE MNGMENT TRAINING: CPT

## 2018-06-08 PROCEDURE — 97110 THERAPEUTIC EXERCISES: CPT

## 2018-06-08 PROCEDURE — 6370000000 HC RX 637 (ALT 250 FOR IP): Performed by: FAMILY MEDICINE

## 2018-06-08 PROCEDURE — 97530 THERAPEUTIC ACTIVITIES: CPT

## 2018-06-08 PROCEDURE — 97166 OT EVAL MOD COMPLEX 45 MIN: CPT

## 2018-06-08 PROCEDURE — 6360000002 HC RX W HCPCS: Performed by: INTERNAL MEDICINE

## 2018-06-08 RX ORDER — DIPHENHYDRAMINE HCL 25 MG
25 TABLET ORAL NIGHTLY PRN
Status: DISCONTINUED | OUTPATIENT
Start: 2018-06-08 | End: 2018-07-03 | Stop reason: HOSPADM

## 2018-06-08 RX ORDER — HALOPERIDOL 5 MG/ML
5 INJECTION INTRAMUSCULAR
Status: DISCONTINUED | OUTPATIENT
Start: 2018-06-08 | End: 2018-06-08

## 2018-06-08 RX ADMIN — METOPROLOL TARTRATE 25 MG: 25 TABLET ORAL at 19:56

## 2018-06-08 RX ADMIN — DONEPEZIL HYDROCHLORIDE 10 MG: 10 TABLET, FILM COATED ORAL at 19:56

## 2018-06-08 RX ADMIN — ENOXAPARIN SODIUM 40 MG: 40 INJECTION, SOLUTION INTRAVENOUS; SUBCUTANEOUS at 20:01

## 2018-06-08 RX ADMIN — PRAVASTATIN SODIUM 80 MG: 80 TABLET ORAL at 19:57

## 2018-06-08 RX ADMIN — DOCUSATE SODIUM 100 MG: 100 CAPSULE, LIQUID FILLED ORAL at 19:58

## 2018-06-08 RX ADMIN — ACETAMINOPHEN 1000 MG: 500 TABLET ORAL at 19:59

## 2018-06-08 RX ADMIN — DOXAZOSIN 4 MG: 4 TABLET ORAL at 19:56

## 2018-06-08 ASSESSMENT — PAIN SCALES - WONG BAKER
WONGBAKER_NUMERICALRESPONSE: 8
WONGBAKER_NUMERICALRESPONSE: 8

## 2018-06-08 ASSESSMENT — PAIN SCALES - GENERAL
PAINLEVEL_OUTOF10: 5
PAINLEVEL_OUTOF10: 2
PAINLEVEL_OUTOF10: 5
PAINLEVEL_OUTOF10: 2
PAINLEVEL_OUTOF10: 8

## 2018-06-08 ASSESSMENT — PAIN DESCRIPTION - ORIENTATION
ORIENTATION: RIGHT
ORIENTATION: RIGHT

## 2018-06-08 ASSESSMENT — PAIN DESCRIPTION - PAIN TYPE
TYPE: ACUTE PAIN
TYPE: ACUTE PAIN

## 2018-06-08 ASSESSMENT — PAIN DESCRIPTION - LOCATION
LOCATION: SHOULDER

## 2018-06-08 NOTE — PROGRESS NOTES
[address] [address]  Recreational Therapy  Daily Note  [facility]    Time Spent with Patient: 30 minutes    Date:  6/8/2018       Patient Name: Gabriel Segundo      MRN: [id number]      YOB: 1934 (80 y.o.)       Gender: male  Diagnosis: humerus head fracture right closed   Referring Practitioner: Ordering: Jennifer Martinez MD Attending: Dr. May Mccord    RESTRICTIONS/PRECAUTIONS:  Restrictions/Precautions: General Precautions, Fall Risk, Weight Bearing  Vision: Impaired  Hearing: Within functional limits    PAIN: 0 - no c/o pain     SUBJECTIVE:  I'll listen to the music     OBJECTIVE:  Via w/c pt arrived to the [address] [place] [address] to listen to the keyboard entertainer with peers - tech sat with pt to help with any of his needs - pt stated that he did enjoy the music          Patient Education  New Education Provided: Importance of Leisure,     Electronically signed by:  Chuck Hinton  Date: 6/8/2018

## 2018-06-08 NOTE — PROGRESS NOTES
Repositioned pt in bedside chair. Pt became agitated and attempted to kick this nurse. Upon trying to leave room pt noted attempting to get out of chair and putting legs over side of chair. Pt redirected and informed needed to stay in the chair at this time. Pt follows direction. Will continue to monitor for pt safety.

## 2018-06-08 NOTE — PROGRESS NOTES
[address] [address]  Transitional Care Unit Preadmission Assessment    Patient Name: Deedee Iniguez        MRN: [id number]    [place]: [de-identified]    : 1934  (80 y.o.)  Gender: male     Admitted From:  [x][facility] []Outside Admission - Location:  Date of Admission to the hospital:2018  Date patient eligible for admission:18  Primary Diagnosis S/P fall right humerus fracture  Did patient have surgery? [] Yes- Explain:   [x] No    Physicians:  Risk for clinical complications/co-morbidities:   Past Medical History:   Diagnosis Date    Anoxic brain injury ([facility]     Arthritis     knees    CAD (coronary artery disease)     CVA (cerebral infarction)     Hyperlipidemia     Hypertension     Iron deficiency anemia     [facility]      Financial Information  Primary insurance:  []Medicare [x] Medicare HMO  []Commercial insurance    []Medicaid   []Workers Compensation      Secondary Insurance:  []Medicare [] Medicare HMO  []Commercial insurance    []Medicaid   []Workers Compensation [x]None    Precautions:   []Cardiac Precautions  []Total hip precautions    []Weight Bearing status:  [x]Safety Precautions/Concerns fall fall precautions  []Visually impaired   []Hard of Hearing     Isolation Precautions:         []Yes  [x]No  If Yes:  [] Droplet  []Contact []Airborne                   []VRE          []MRSA               []C-diff         [] TB  [] Other:       Skills:   [x]Physical therapy     [x]Occupational Therapy   []IV Antibiotics   [] IV meds   [] TPN     []PEG tube Feedings      []New Colostomy   [] Ileostomy care/teaching    [] Speech Therapy   []Wound Vac   []Complex Dressing Changes    []Terminal care    []Other       Has patient had Prior Skilled care in the Last 60 days:  []Yes  [x]NO   If Yes:   Skilled Facility:    How many skilled days were used?

## 2018-06-08 NOTE — FLOWSHEET NOTE
06/08/18 1009   Provider Notification   Reason for Communication Evaluate   Provider Name dr sanchez   Provider Notification Physician   Method of Communication Secure Message   Response See orders   Notification Time 200     Dr sanchez given update on pt condition. Refusing therapy, meds, pants. And informed pt thinks we are \"trying to kill him\". Orders received for stat psych consult and haldol x 1 dose.

## 2018-06-08 NOTE — FLOWSHEET NOTE
06/08/18 1030   Provider Notification   Provider Name dr Rony Peterson   Provider Notification Physician   Response No new orders   Notification Time 56     Will see pt either today or tomorrow

## 2018-06-08 NOTE — PROGRESS NOTES
Admission Note for TCU     Name:  Verona Wesley    MR#:    [id number]  Ra: [de-identified]      :   1934    Long Term Care Facility Type: Transitional Care Unit     Dx:   Right humerus fracture s/p fall     Patient Active Problem List   Diagnosis    Right inguinal hernia    Left hemiparesis (HCC)    Colocutaneous fistula    Abdominal wall abscess    Normocytic anemia    Physical deconditioning    [facility] ([facility]    Coronary artery disease involving native coronary artery of native heart without angina pectoris    Diastolic dysfunction with chronic heart failure ([facility]    Ileus ([facility])    Hypophosphatemia    Hyponatremia    Essential hypertension    Acute kidney injury ([address]    Insomnia    Confusion    Metabolic encephalopathy    Postoperative anemia due to acute blood loss    Severe malnutrition (HCC)    Iron deficiency anemia due to chronic blood loss    Urinary retention    Benign prostatic hyperplasia with urinary obstruction    Dementia without behavioral disturbance    Agitation    Severe sepsis ([facility]    Chest pain  pe or nstemi    Prostatitis    NSTEMI (non-ST elevated myocardial infarction) (HCC)    Elevated troponin    SIRS (systemic inflammatory response syndrome) ([facility]    Elevated brain natriuretic peptide (BNP) level    Bacteremia    Hypotension    Generalized abdominal pain    Metabolic acidosis    Dehydration    S/P colostomy (HCC)    History of CVA (cerebrovascular accident)    Humerus head fracture, right, closed, initial encounter    Acute renal failure (ARF) ([facility]    Overweight (BMI 25.0-29. 9)    CKD (chronic kidney disease) stage 3, GFR 30-59 ml/min       Code Status: Limited      Rehabilitation Potential: Good     Prognosis: Good     Stability: Stable     History & Physical current: Yes   If No, note changes in H&P update     Level of Care Recommendation/Request: Skilled     Physician Certification: I certify that I have reviewed the information contained in the discharge summary and that the information is a true and accurate reflection of the individual's condition. I certify this resident is appropriate for skilled services provided in the TCU/ECF for a condition which the individual received inpatient care. Certification: I certify the orders, information, and transfer of said patient is necessary for the continuing treatment of the diagnosis listed in a skilled care setting providing skilled nursing and/or skilled rehabilitative services. The patient will require on a daily basis SNF covered care.     Electronically signed by Janene Ken MD on 6/8/2018 at 7:57 AM

## 2018-06-08 NOTE — PROGRESS NOTES
attention  Memory: Decreased recall of precautions, Decreased recall of recent events, Decreased short term memory  Safety Judgement: Decreased awareness of need for safety, Decreased awareness of need for assistance  Problem Solving: Decreased awareness of errors, Assistance required to correct errors made, Assistance required to generate solutions, Assistance required to identify errors made, Assistance required to implement solutions  Insights: Not aware of deficits  Initiation: Requires cues for all  Sequencing: Requires cues for all    Sensation  Overall Sensation Status:  (TARA d/t cognition )          LUE AROM (degrees)  LUE AROM : WFL     RUE AROM (degrees)  RUE General AROM: Not tested for the shoulder. WFL for the hand . LUE Strength  Gross LUE Strength: WFL  L Hand Grasp: NT  L Hand Release: NT    RUE Strength  R Hand Grasp: NT  R Hand Release: NT  RUE Strength Comment: Not tested d/t fracture. ADL  Grooming: Minimal assistance (To wash/dry face while sitting up in recliner. )  UE Bathing: Minimal assistance (To wash BUE while sitting. )  LE Bathing: Other (Comment) (Pt refused to complete and became agitated. )  LE Dressing: Dependent/Total (Pt became agitated when adjusting B socks. and doff brief)     Bed mobility  Comment: Pt was sitting up in recliner upon arrival and didn't want to return to bed at end of session. Transfers  Sit to stand: Minimal assistance (x 2 from recliner. Pt is confused and unable to maintain RUE NWB with max vc and tactile cues. )  Stand to sit: Minimal assistance (x 2 Onto recliner. )    Balance  Sitting Balance: Contact guard assistance  Standing Balance: Contact guard assistance (x 2. Pt became agitated and started saying, \"Stop touching me! \" )     Time: 5 minutes  Activity: standing tolerance and attempting to have pt sit in chair.       Functional Mobility  Functional - Mobility Device: No device  Activity: Other  Assist Level: Minimal assistance (x 1 and cga x 1 )  Functional Mobility Comments: Pt ambulated 5 feet in room, slow pace, 1 LOB requiring min A to correct, unable to redirect. Activity Tolerance:  Activity Tolerance: Patient limited by fatigue, Patient limited by pain, Treatment limited secondary to decreased cognition    Treatment Initiated:  OT Evaluation completed, see above for details. Assessment:  Assessment: Pt with a decline in ability to complete self care tasks due to recent fall and humeral fractures. Pt demonstreates decreased cognition and unable to follow commands throughout session. Pt is requiring Max A for all LB self cares. This is a significant decline from being independent PTA with basic mobility S for ADLs. pt would benefit from aditional skilled OT services to adress  increasing independence and safety awareness for independence in self care tasks   Performance deficits / Impairments: Decreased functional mobility , Decreased safe awareness, Decreased balance, Decreased ADL status, Decreased cognition, Decreased ROM, Decreased endurance, Decreased strength, Decreased high-level IADLs  Prognosis: Poor  Discharge Recommendations: Patient would benefit from continued therapy after discharge, 24 hour supervision or assist    Clinical Decision Making: Clinical Decision making was of Moderate Complexity as the result of analysis of data from a detailed assessment, a consideration of several treatment options, the presence of comorbidities affecting the plan of care and the need for minimal to moderate modifications or assistance required to complete the evaluation. Patient Education:  Patient Education: NWB, Positioning, Safety with transfers, OT Role, OT POC, Orientation. Barriers to Learning: Cognition     Equipment Recommendations:  Equipment Needed: No  Other: Continue to monitor. Safety:  Safety Devices in place: Yes  Type of devices:  All fall risk precautions in place, Call light within reach, Chair alarm in place (Pt refused gait belt and waffle cushion. Telesittier present. )    Plan:  Times per week: 6x   Current Treatment Recommendations: Endurance Training, Patient/Caregiver Education & Training, Equipment Evaluation, Education, & procurement, Self-Care / ADL, Balance Training, Functional Mobility Training, Safety Education & Training, ROM    Goals:  Patient goals : Decreased R shoulder pain    Short term goals  Time Frame for Short term goals: 1 week  Short term goal 1: Pt will complete ADL routine with no  >mod cues for safe and adapted tech within shoulder precations and Mod A to increase independence in self care routine  Short term goal 2: Pt will complte basic transfers with no >CGA and min cues for claire walker tech to increase independence with toileting. Short term goal 3: Pt will tolerate standing x 5 min with SBA to increase endurance for sinkside AD routine. Short term goal 4: Pt/ family will assist with donning sling with no > min A and mod cues to increase independene in self care tasks. Long term goals  Time Frame for Long term goals : 2 weeks   Long term goal 1: Pt will complete RUE distal AROM and LUE light resistive exercises with mod vc to increase ease of transfers and self cares. Long term goal 2: Pt will complete functional mobility to/from therapy gym with CGA and mod vc for safety to increase indep with self cares. Evaluation Complexity: Based on the findings of patient history, examination, clinical presentation, and decision making during this evaluation, this patient is of medium complexity.

## 2018-06-08 NOTE — CONSULTS
Consults   Nutrition Assessment    Type and Reason for Visit: Initial, Consult (New TCU Admit)    Nutrition Recommendations: Continue current diet and Ensure Enlive TID. Consider a Multivitamin w/minerals daily to aid in healing. Malnutrition Assessment:  · Malnutrition Status: At risk for malnutrition    Nutrition Diagnosis:   · Problem: Inadequate oral intake  · Etiology: related to Insufficient energy/nutrient consumption     Signs and symptoms:  as evidenced by Intake 0-25%, Intake 25-50%, Diet history of poor intake    Nutrition Assessment:  · Subjective Assessment: Pt s/p fall with right humerus fracture. Pt w/colectomy. Hx dementia. No family present at this time. Pt seen- he reports appetite and intake was good PTA consuming most of his meals. Pt states since admission his appetite has been poor, consuming 1-25% of meals. Pt reports his UBW ~136 lb. Current weight is up from UBW. No recent weight over the past year per EMR. Pt denies N/V or chewing/swallowing difficulties. No stool output yet. Discussed importance of protein for healing during LOS. Pt amenable to try Ensure Enlive during LOS (prefers strawberry and vanilla). Labs: BUN 55, Cr.1.5, POC Glucose 111.   · Wound Type: None  · Current Nutrition Therapies:  · Oral Diet Orders: Cardiac   · Oral Diet intake: 26-50%, 1-25%, %  · Oral Nutrition Supplement (ONS) Orders: Standard High Calorie Oral Supplement (Ensure Enlive TID)  · ONS intake:  (Initiated today)  · Anthropometric Measures:  · Ht: 5' 2\" (157.5 cm)   · Current Body Wt: 159 lb 9.6 oz (72.4 kg) (6/7; +2 pitting RUE)  · Admission Body Wt: 159 lb 9.6 oz (72.4 kg) (6/7; +2 pitting RUE)  · Usual Body Wt: 136 lb (61.7 kg) (per pt report; 120 lb ob 3/26/17; 150 lb on 1/2/17)  · % Weight Change: no recent weight over the past year per EMR   · Ideal Body Wt: 118 lb (53.5 kg), % Ideal Body 135%  · BMI Classification: BMI 25.0 - 29.9 Overweight (28.3)  · Comparative Standards (Estimated Nutrition Needs):  · Estimated Daily Total Kcal: 2000-5762 kcal/day  · Estimated Daily Protein (g): 42-56 g/day- monitoring renal status    Estimated Intake vs Estimated Needs: Intake Less Than Needs    Nutrition Risk Level: High    Nutrition Interventions:   Continue current diet, Start ONS, Vitamin Supplement (Started Ensure Enlive TID. Consider MVI w/minerals daily to aid in healing)  Continued Inpatient Monitoring, Education Initiated    Nutrition Evaluation:   · Evaluation: Goals set   · Goals: Pt will consume 75% or more of meals during LOS    · Monitoring: Meal Intake, Supplement Intake, Diet Tolerance, Skin Integrity, Weight, Mental Status/Confusion, Pertinent Labs, Ascites/Edema    See Adult Nutrition Doc Flowsheet for more detail.      Electronically signed by Adeel Dobbins RD, LD on 6/8/18 at 2:43 PM    Contact Number: [phone]

## 2018-06-08 NOTE — H&P
Family History:       Problem Relation Age of Onset    High Blood Pressure Mother     Stroke Mother     High Blood Pressure Sister     Stroke Sister     High Blood Pressure Brother     Stroke Brother        Review of Systems:  CONSTITUTIONAL:  positive for  fatigue and malaise  EYES:  positive for  glasses  HEENT:  negative for  nasal congestion  RESPIRATORY:  negative for  cough with sputum  CARDIOVASCULAR:  negative for  chest pain  GASTROINTESTINAL:  negative for constipation  GENITOURINARY:  negative for dysuria  SKIN:  negative  HEMATOLOGIC/LYMPHATIC:  negative for easy bruising  MUSCULOSKELETAL:  positive for  myalgias, arthralgias, pain, decreased range of motion, muscle weakness and bone pain  NEUROLOGICAL:  positive for memory problems, coordination problems and gait problems  BEHAVIOR/PSYCH:  positive for poor concentration and agitated  10 point system review otherwise negative    Physical Exam:  BP (!) 149/65   Pulse 110   Temp 96.6 °F (35.9 °C) (Oral)   Resp 18   Ht 5' 2\" (1.575 m)   Wt 154 lb 9.6 oz (70.1 kg)   SpO2 96%   BMI 28.28 kg/m²   Well developed well nourished middle [facility] male who is awake alert and cooperative siting in the chair. Skin atrophic  Membranes moist  Head normocephalic  Neck without mass  Chest symmetrical   Lungs CTA  Heart S1S2 without murmur  Abd soft, non tender, normoactive BS and no mass, ostomy bag present in the LLQ area  Ext with the right arm in a sling and sloth, trace swelling in the legs  Neuro weak  psy cooperative with me    Diagnostics:  No results found for this or any previous visit (from the past 24 hour(s)). Impression:  Active Hospital Problems    Diagnosis Date Noted    [facility] [G81.94] 09/13/2015     Priority: High     Class: Acute    Overweight (BMI 25.0-29. 9) [E66.3] 06/07/2018    CKD (chronic kidney disease) stage 3, GFR 30-59 ml/min [N18.3] 06/07/2018    Humerus head fracture, right, closed, initial

## 2018-06-08 NOTE — PROGRESS NOTES
Consulted to see patient for possible stage 1 to the back of head. Instructed staff to photo and to apply skin prep to the red area. Instructed to keep pressure off of the area and to re assess and let wound ostomy know if it does not begin to gato and or forms a blister. Regarding patient's colostomy, staff to check with wife regarding if she wants to use jesu 2 piece cut to fit while he is in the hospital.  Will plan to see sometime next week.

## 2018-06-08 NOTE — PLAN OF CARE
Problem: Activity:  Goal: Physical symptoms of sleep deprivation will improve  Physical symptoms of sleep deprivation will improve   Outcome: Ongoing  Pt did not rest this shift. Remained awake. When offered to lay down to rest pt refused. Goal: Sleeping patterns will improve  Sleeping patterns will improve   Outcome: Not Met This Shift  Pt did not rest this shift. Remained awake. When offered to lay down to rest pt refused. Problem: Bleeding:  Goal: Will show no signs and symptoms of excessive bleeding  Will show no signs and symptoms of excessive bleeding   Outcome: Met This Shift  No bleeding noted. Problem: Pain:  Goal: Pain level will decrease  Pain level will decrease   Outcome: Not Met This Shift  Pt report pain at >5 on scale. Pt pain goal of a 5 on scale. Pt refuses to take pain meds. Wife attempted to assist without luck. Goal: Control of acute pain  Control of acute pain   Outcome: Not Met This Shift  Pt report pain at >5 on scale. Pt pain goal of a 5 on scale. Pt refuses to take pain meds. Wife attempted to assist without luck. Problem: Skin Integrity:  Goal: Risk for impaired skin integrity will decrease  Risk for impaired skin integrity will decrease   Outcome: Ongoing  Redness and bruising as noted. No other skin impairments noted. Pt understands the importance of frequent repositioning in order to prevent any skin breakdown. Problem: Falls - Risk of:  Goal: Will remain free from falls  Will remain free from falls   Outcome: Ongoing  Pt not using call light appropriately to call for assistance with ambulation to the bathroom and to chair. Pt is also compliant with use of non-skid slippers. Pt reports understanding of fall prevention when discussed. Goal: Absence of physical injury  Absence of physical injury   Outcome: Ongoing  Pt not using call light appropriately to call for assistance with ambulation to the bathroom and to chair.  Pt is also compliant with use of non-skid

## 2018-06-09 PROCEDURE — 1290000000 HC SEMI PRIVATE OTHER R&B

## 2018-06-09 PROCEDURE — 6360000002 HC RX W HCPCS: Performed by: INTERNAL MEDICINE

## 2018-06-09 PROCEDURE — 6370000000 HC RX 637 (ALT 250 FOR IP): Performed by: FAMILY MEDICINE

## 2018-06-09 PROCEDURE — 6370000000 HC RX 637 (ALT 250 FOR IP): Performed by: INTERNAL MEDICINE

## 2018-06-09 RX ADMIN — DIPHENHYDRAMINE HCL 25 MG: 25 TABLET ORAL at 22:26

## 2018-06-09 RX ADMIN — ASPIRIN 81 MG: 81 TABLET ORAL at 09:25

## 2018-06-09 RX ADMIN — OMEGA-3-ACID ETHYL ESTERS 1 CAPSULE: 1 CAPSULE, LIQUID FILLED ORAL at 14:19

## 2018-06-09 RX ADMIN — FERROUS SULFATE TAB 325 MG (65 MG ELEMENTAL FE) 325 MG: 325 (65 FE) TAB at 22:27

## 2018-06-09 RX ADMIN — DOXAZOSIN 4 MG: 4 TABLET ORAL at 22:27

## 2018-06-09 RX ADMIN — OXYCODONE HYDROCHLORIDE AND ACETAMINOPHEN 500 MG: 500 TABLET ORAL at 14:19

## 2018-06-09 RX ADMIN — ACETAMINOPHEN 1000 MG: 500 TABLET ORAL at 09:25

## 2018-06-09 RX ADMIN — METOPROLOL TARTRATE 25 MG: 25 TABLET ORAL at 22:26

## 2018-06-09 RX ADMIN — PANTOPRAZOLE SODIUM 40 MG: 40 TABLET, DELAYED RELEASE ORAL at 14:19

## 2018-06-09 RX ADMIN — FERROUS SULFATE TAB 325 MG (65 MG ELEMENTAL FE) 325 MG: 325 (65 FE) TAB at 09:25

## 2018-06-09 RX ADMIN — PRAVASTATIN SODIUM 80 MG: 80 TABLET ORAL at 22:26

## 2018-06-09 RX ADMIN — GUAIFENESIN 600 MG: 600 TABLET, EXTENDED RELEASE ORAL at 09:25

## 2018-06-09 RX ADMIN — ENOXAPARIN SODIUM 40 MG: 40 INJECTION, SOLUTION INTRAVENOUS; SUBCUTANEOUS at 22:27

## 2018-06-09 RX ADMIN — FINASTERIDE 5 MG: 5 TABLET, FILM COATED ORAL at 09:25

## 2018-06-09 RX ADMIN — METOPROLOL TARTRATE 25 MG: 25 TABLET ORAL at 09:25

## 2018-06-09 RX ADMIN — ACETAMINOPHEN 1000 MG: 500 TABLET ORAL at 22:27

## 2018-06-09 RX ADMIN — ACETAMINOPHEN 1000 MG: 500 TABLET ORAL at 14:22

## 2018-06-09 ASSESSMENT — PAIN SCALES - GENERAL
PAINLEVEL_OUTOF10: 0
PAINLEVEL_OUTOF10: 10
PAINLEVEL_OUTOF10: 5
PAINLEVEL_OUTOF10: 0

## 2018-06-09 ASSESSMENT — PAIN DESCRIPTION - LOCATION: LOCATION: SHOULDER

## 2018-06-09 ASSESSMENT — PAIN DESCRIPTION - ORIENTATION: ORIENTATION: RIGHT

## 2018-06-09 ASSESSMENT — PAIN DESCRIPTION - DESCRIPTORS: DESCRIPTORS: SHARP;ACHING

## 2018-06-09 ASSESSMENT — PAIN DESCRIPTION - FREQUENCY: FREQUENCY: CONTINUOUS

## 2018-06-09 ASSESSMENT — PAIN DESCRIPTION - ONSET: ONSET: ON-GOING

## 2018-06-09 ASSESSMENT — PAIN DESCRIPTION - PAIN TYPE: TYPE: ACUTE PAIN

## 2018-06-09 NOTE — FLOWSHEET NOTE
While patient today was sleeping, this  had conversation with his wife. She is encouraged today that he was able to sleep.       06/09/18 1700   Encounter Summary   Services provided to: Patient and family together   Referral/Consult From: [address] [place] [address]; Children   Continue Visiting Yes  (6/9)   Complexity of Encounter Moderate   Length of Encounter 15 minutes   Spiritual/Temple   Type Spiritual support   Assessment Approachable   Intervention Active listening;Explored feelings, thoughts, concerns;Explored coping resources;Nurtured hope   Outcome Expressed gratitude   words of comfort given.

## 2018-06-10 LAB
ANION GAP SERPL CALCULATED.3IONS-SCNC: 12 MEQ/L (ref 8–16)
BASOPHILS # BLD: 0.4 %
BASOPHILS ABSOLUTE: 0 THOU/MM3 (ref 0–0.1)
BUN BLDV-MCNC: 45 MG/DL (ref 7–22)
CALCIUM SERPL-MCNC: 8.8 MG/DL (ref 8.5–10.5)
CHLORIDE BLD-SCNC: 103 MEQ/L (ref 98–111)
CO2: 23 MEQ/L (ref 23–33)
CREAT SERPL-MCNC: 1.4 MG/DL (ref 0.4–1.2)
EOSINOPHIL # BLD: 0.1 %
EOSINOPHILS ABSOLUTE: 0 THOU/MM3 (ref 0–0.4)
GFR SERPL CREATININE-BSD FRML MDRD: 48 ML/MIN/1.73M2
GLUCOSE BLD-MCNC: 102 MG/DL (ref 70–108)
HCT VFR BLD CALC: 30 % (ref 42–52)
HEMOGLOBIN: 10.3 GM/DL (ref 14–18)
LYMPHOCYTES # BLD: 35.1 %
LYMPHOCYTES ABSOLUTE: 2.4 THOU/MM3 (ref 1–4.8)
MCH RBC QN AUTO: 29.1 PG (ref 27–31)
MCHC RBC AUTO-ENTMCNC: 34.1 GM/DL (ref 33–37)
MCV RBC AUTO: 85.3 FL (ref 80–94)
MONOCYTES # BLD: 6.8 %
MONOCYTES ABSOLUTE: 0.5 THOU/MM3 (ref 0.4–1.3)
NUCLEATED RED BLOOD CELLS: 0 /100 WBC
PDW BLD-RTO: 13.2 % (ref 11.5–14.5)
PLATELET # BLD: 207 THOU/MM3 (ref 130–400)
PMV BLD AUTO: 7.4 FL (ref 7.4–10.4)
POTASSIUM SERPL-SCNC: 4.7 MEQ/L (ref 3.5–5.2)
RBC # BLD: 3.52 MILL/MM3 (ref 4.7–6.1)
SEG NEUTROPHILS: 57.6 %
SEGMENTED NEUTROPHILS ABSOLUTE COUNT: 4 THOU/MM3 (ref 1.8–7.7)
SODIUM BLD-SCNC: 138 MEQ/L (ref 135–145)
WBC # BLD: 6.9 THOU/MM3 (ref 4.8–10.8)

## 2018-06-10 PROCEDURE — 6370000000 HC RX 637 (ALT 250 FOR IP): Performed by: FAMILY MEDICINE

## 2018-06-10 PROCEDURE — 36415 COLL VENOUS BLD VENIPUNCTURE: CPT

## 2018-06-10 PROCEDURE — 6360000002 HC RX W HCPCS: Performed by: INTERNAL MEDICINE

## 2018-06-10 PROCEDURE — 80048 BASIC METABOLIC PNL TOTAL CA: CPT

## 2018-06-10 PROCEDURE — 97535 SELF CARE MNGMENT TRAINING: CPT

## 2018-06-10 PROCEDURE — 1290000000 HC SEMI PRIVATE OTHER R&B

## 2018-06-10 PROCEDURE — 97116 GAIT TRAINING THERAPY: CPT

## 2018-06-10 PROCEDURE — 97110 THERAPEUTIC EXERCISES: CPT

## 2018-06-10 PROCEDURE — 85025 COMPLETE CBC W/AUTO DIFF WBC: CPT

## 2018-06-10 PROCEDURE — 6370000000 HC RX 637 (ALT 250 FOR IP): Performed by: INTERNAL MEDICINE

## 2018-06-10 RX ORDER — TRAMADOL HYDROCHLORIDE 50 MG/1
50 TABLET ORAL EVERY 6 HOURS PRN
Status: DISCONTINUED | OUTPATIENT
Start: 2018-06-10 | End: 2018-06-12

## 2018-06-10 RX ORDER — MORPHINE SULFATE 2 MG/ML
2 INJECTION, SOLUTION INTRAMUSCULAR; INTRAVENOUS EVERY 4 HOURS PRN
Status: DISCONTINUED | OUTPATIENT
Start: 2018-06-10 | End: 2018-07-03 | Stop reason: HOSPADM

## 2018-06-10 RX ORDER — ACETAMINOPHEN 325 MG/1
650 TABLET ORAL EVERY 4 HOURS PRN
Status: DISCONTINUED | OUTPATIENT
Start: 2018-06-10 | End: 2018-07-03 | Stop reason: HOSPADM

## 2018-06-10 RX ADMIN — ASPIRIN 81 MG: 81 TABLET ORAL at 10:45

## 2018-06-10 RX ADMIN — FINASTERIDE 5 MG: 5 TABLET, FILM COATED ORAL at 10:45

## 2018-06-10 RX ADMIN — ENOXAPARIN SODIUM 40 MG: 40 INJECTION, SOLUTION INTRAVENOUS; SUBCUTANEOUS at 22:42

## 2018-06-10 RX ADMIN — DIPHENHYDRAMINE HCL 25 MG: 25 TABLET ORAL at 22:40

## 2018-06-10 RX ADMIN — ACETAMINOPHEN 1000 MG: 500 TABLET ORAL at 15:49

## 2018-06-10 RX ADMIN — ACETAMINOPHEN 1000 MG: 500 TABLET ORAL at 22:40

## 2018-06-10 RX ADMIN — PANTOPRAZOLE SODIUM 40 MG: 40 TABLET, DELAYED RELEASE ORAL at 05:29

## 2018-06-10 RX ADMIN — METOPROLOL TARTRATE 25 MG: 25 TABLET ORAL at 10:45

## 2018-06-10 RX ADMIN — OMEGA-3-ACID ETHYL ESTERS 1 CAPSULE: 1 CAPSULE, LIQUID FILLED ORAL at 10:50

## 2018-06-10 RX ADMIN — OXYCODONE HYDROCHLORIDE AND ACETAMINOPHEN 500 MG: 500 TABLET ORAL at 10:45

## 2018-06-10 RX ADMIN — ACETAMINOPHEN 1000 MG: 500 TABLET ORAL at 10:50

## 2018-06-10 RX ADMIN — GUAIFENESIN 600 MG: 600 TABLET, EXTENDED RELEASE ORAL at 10:46

## 2018-06-10 RX ADMIN — METOPROLOL TARTRATE 25 MG: 25 TABLET ORAL at 22:42

## 2018-06-10 RX ADMIN — ACETAMINOPHEN 1000 MG: 500 TABLET ORAL at 01:21

## 2018-06-10 RX ADMIN — FERROUS SULFATE TAB 325 MG (65 MG ELEMENTAL FE) 325 MG: 325 (65 FE) TAB at 22:41

## 2018-06-10 RX ADMIN — FERROUS SULFATE TAB 325 MG (65 MG ELEMENTAL FE) 325 MG: 325 (65 FE) TAB at 10:45

## 2018-06-10 ASSESSMENT — PAIN DESCRIPTION - PROGRESSION
CLINICAL_PROGRESSION: NOT CHANGED

## 2018-06-10 ASSESSMENT — PAIN DESCRIPTION - ORIENTATION
ORIENTATION: RIGHT
ORIENTATION: RIGHT

## 2018-06-10 ASSESSMENT — PAIN DESCRIPTION - DESCRIPTORS: DESCRIPTORS: ACHING

## 2018-06-10 ASSESSMENT — PAIN SCALES - WONG BAKER: WONGBAKER_NUMERICALRESPONSE: 6

## 2018-06-10 ASSESSMENT — PAIN DESCRIPTION - LOCATION
LOCATION: SHOULDER
LOCATION: SHOULDER;KNEE

## 2018-06-10 ASSESSMENT — PAIN SCALES - GENERAL
PAINLEVEL_OUTOF10: 8
PAINLEVEL_OUTOF10: 8
PAINLEVEL_OUTOF10: 7
PAINLEVEL_OUTOF10: 8
PAINLEVEL_OUTOF10: 8

## 2018-06-10 ASSESSMENT — PAIN DESCRIPTION - PAIN TYPE
TYPE: ACUTE PAIN
TYPE: ACUTE PAIN

## 2018-06-10 ASSESSMENT — PAIN DESCRIPTION - FREQUENCY: FREQUENCY: CONTINUOUS

## 2018-06-10 ASSESSMENT — PAIN DESCRIPTION - ONSET: ONSET: ON-GOING

## 2018-06-10 NOTE — PROGRESS NOTES
5.6   6.9   RBC Latest Ref Range: 4.70 - 6.10 mill/mm3  3.50 (L)   3.52 (L)   Hemoglobin Quant Latest Ref Range: 14.0 - 18.0 gm/dl  10.7 (L)   10.3 (L)   Hematocrit Latest Ref Range: 42.0 - 52.0 %  29.8 (L)   30.0 (L)   MCV Latest Ref Range: 80.0 - 94.0 fL  85.2   85.3   MCH Latest Ref Range: 27.0 - 31.0 pg  30.6   29.1   MCHC Latest Ref Range: 33.0 - 37.0 gm/dl  35.9   34.1   MPV Latest Ref Range: 7.4 - 10.4 fL  7.3 (L)   7.4   RDW Latest Ref Range: 11.5 - 14.5 %  13.3   13.2   Platelet Count Latest Ref Range: 130 - 400 thou/mm3  173   207   Lymphocytes # Latest Ref Range: 1.0 - 4.8 thou/mm3     2.4   Monocytes # Latest Ref Range: 0.4 - 1.3 thou/mm3     0.5   Eosinophils # Latest Ref Range: 0.0 - 0.4 thou/mm3     0.0   Basophils # Latest Ref Range: 0.0 - 0.1 thou/mm3     0.0   Seg Neutrophils Latest Units: %     57.6   Segs Absolute Latest Ref Range: 1.8 - 7.7 thou/mm3     4.0   Lymphocytes Latest Units: %     35.1   Monocytes Latest Units: %     6.8   Eosinophils Latest Units: %     0.1   Basophils Latest Units: %     0.4   Nucleated Red Blood Cells Latest Units: /100 wbc     0   Sodium, Ur Latest Units: meq/l   51         Electronically signed by Shannan Luna MD on 6/10/2018 at 6:23 PM

## 2018-06-10 NOTE — PROGRESS NOTES
[address] - [id number]/067-A    Time In: 3920  Time Out: 2204  Timed Code Treatment Minutes: 30 Minutes  Minutes: 30          Date: 6/10/2018  Patient Name: Greta Villalta,  Gender:  male        MRN: [id number]  : 1934  (80 y.o.)  Referral Date : 18  Referring Practitioner: Ordering: Temo Pearson MD Attending: GM Clark MD  Diagnosis: R humerus fracture s/p fall  Additional Pertinent Hx: Greta Villalta is a 80 y.o. male who presents to the Emergency Department via EMS for the evaluation of a fall. Patient reports a history of hypertension, hyperlipidemia, CVA, CAD, coronary angioplasty with stent placement, cardiac catheterization, and colectomy. He states that he slipped while walking down his stairs and landed on his right shoulder. He denies that he hit his head or lost consciousness, but reports that he is on Plavix. Patient rates his right shoulder pain at a 9/10 in severity, and states that the pain is sharp and aching in quality. He reports that the pain is continuous in duration. Patient denies experiencing any chest pain, neck pain, headache, visual changes, weakness, numbness, tingling, fever, chills, dizziness, lightheadedness, or cough. He reports that he ate breakfast this morning. He denies any history of previous right shoulder injuries. Patient denies further complaints at initial encounter. Non surgical treatment by ortho. To TCU on .      Past Medical History:   Diagnosis Date    [facility])     Arthritis     knees    CAD (coronary artery disease)     CVA (cerebral infarction)     Hyperlipidemia     Hypertension     Iron deficiency anemia     Stroke (cerebrum) ([facility]      Past Surgical History:   Procedure Laterality Date    BACK SURGERY  ,    [facility]    COLECTOMY  2017    Sigmoid Colon Resection, Colostomy, Mobilization Splenic Flexure, Drainage of LLQ Abcess

## 2018-06-10 NOTE — PROGRESS NOTES
Romeshannondella Nance 60  INPATIENT OCCUPATIONAL THERAPY  [facility] TCU 8E  DAILY NOTE    Time:  Time In: 0830  Time Out: 0900  Timed Code Treatment Minutes: 30 Minutes  Minutes: 30    Date: 6/10/2018  Patient Name: Beck Hernandez,   Gender: male      Room: -67/067-A  MRN: [id number]  : 1934  (80 y.o.)  Referring Practitioner: Ordering: Larry Lozano MD Attending: Dr. Tia Serrano  Diagnosis: humerus head fracture right closed   Additional Pertinent Hx: The patient is a 80 y.o. male  who presents with a history of a fall onto his right shoulder. Severe pain, sharp in character, worse with any motion. Seen in the ED and films revealed a non displaced right proximal humerus fracture. Ortho recommended sling and conservative treatment. Patient was having severe pain and unable to be discharged from the ED. He was seen on 7K and still with significant right shoulder pain, sharp, denied numbness or tingling in the right hand. He also denies any other injuries at this time. . Pt was admitted to [address] [place] [address] on 18.      Past Medical History:   Diagnosis Date    [facility] injury [facility])     Arthritis     knees    CAD (coronary artery disease)     CVA (cerebral infarction)     Hyperlipidemia     Hypertension     Iron deficiency anemia     Stroke (cerebrum) ([facility] [address]      Past Surgical History:   Procedure Laterality Date    [facility]  ,    [facility]    COLECTOMY  2017    Sigmoid Colon Resection, Colostomy, Mobilization Splenic Flexure, Drainage of LLQ Abcess Exploratory Laparotomy - Dr. Jarred Chavarria      [facility] CATH LAB PROCEDURE         Restrictions/Precautions:  Weight Bearing, General Precautions, Fall Risk, Contact Precautions     Right Upper Extremity Weight Bearing: Non Weight Bearing  Other position/activity restrictions: Pt has a h/o of an anoxic brain injury  Right Upper Extremity Brace/Splint: Sling    Prior Level of Function:  ADL Assistance: Independent  Homemaking Assistance: Independent  Ambulation Assistance: Independent  Transfer Assistance: Independent  Additional Comments: all information gathered from chart review; spouse or caregiver not present and pt not able to appropriately answer questions d/t history of dementia    Subjective  Subjective: Patient supine upon arrival, agreeable to OT and pleasently confused. Cooperative with OT tx but requesting to return to bed at EOS. Comments: Reorientation completed with limited success. Trialed problem solving strategies of using environmental cues with limited success. Pain:  Pain Assessment  Patient Currently in Pain: Yes  Belcher-Torres Pain Rating: Hurts even more (unable to state number d/t cognition, but grimaces with bed mobility )    Objective  Overall Cognitive Status: Exceptions  Cognition Comment: disoriented, impaired problem solving, impaired attention, poor recall, impaired STM    ADL  Toileting: Contact guard assistance (x 3 min standing during toileting )     Bed mobility  Supine to Sit: Moderate assistance (for BLEs )  Sit to Supine: Moderate assistance (for BLEs )  Scooting: Maximal assistance  - Increased time required for bed mobility     Transfers  Sit to stand: Contact guard assistance (EOB )  Stand to sit: Contact guard assistance  Transfer Comments: Pt anticipates pain. Requires VCs for safety     Balance  Sitting Balance: Stand by assistance  Standing Balance: Contact guard assistance   Time: x 3 min during toileting      Functional Mobility  Functional - Mobility Device: Hemiwalker  Assist Level: Contact guard assistance  Functional Mobility Comments: To/from bathroom. VCs for hemiwalker sequencing.  Min unsteadiness but without LOB     Type of ROM/Therapeutic Exercise: AROM  Comment: Distal AROM in R UE - hand pumps, wrist flexion/extension, wrist circumduction and elbow flexion - no > min A and mod cues to increase independene in self care tasks. Long term goals  Time Frame for Long term goals : 2 weeks   Long term goal 1: Pt will complete RUE distal AROM and LUE light resistive exercises with mod vc to increase ease of transfers and self cares. Long term goal 2: Pt will complete functional mobility to/from therapy gym with CGA and mod vc for safety to increase indep with self cares.

## 2018-06-11 PROCEDURE — 1290000000 HC SEMI PRIVATE OTHER R&B

## 2018-06-11 PROCEDURE — 97530 THERAPEUTIC ACTIVITIES: CPT

## 2018-06-11 PROCEDURE — 97110 THERAPEUTIC EXERCISES: CPT

## 2018-06-11 PROCEDURE — 6360000002 HC RX W HCPCS: Performed by: INTERNAL MEDICINE

## 2018-06-11 PROCEDURE — 6370000000 HC RX 637 (ALT 250 FOR IP): Performed by: FAMILY MEDICINE

## 2018-06-11 PROCEDURE — 6370000000 HC RX 637 (ALT 250 FOR IP): Performed by: INTERNAL MEDICINE

## 2018-06-11 PROCEDURE — 97535 SELF CARE MNGMENT TRAINING: CPT

## 2018-06-11 PROCEDURE — 97116 GAIT TRAINING THERAPY: CPT

## 2018-06-11 RX ADMIN — DOCUSATE SODIUM 100 MG: 100 CAPSULE, LIQUID FILLED ORAL at 09:31

## 2018-06-11 RX ADMIN — METOPROLOL TARTRATE 25 MG: 25 TABLET ORAL at 09:31

## 2018-06-11 RX ADMIN — DOCUSATE SODIUM 100 MG: 100 CAPSULE, LIQUID FILLED ORAL at 20:39

## 2018-06-11 RX ADMIN — ACETAMINOPHEN 1000 MG: 500 TABLET ORAL at 20:39

## 2018-06-11 RX ADMIN — ACETAMINOPHEN 1000 MG: 500 TABLET ORAL at 14:44

## 2018-06-11 RX ADMIN — FERROUS SULFATE TAB 325 MG (65 MG ELEMENTAL FE) 325 MG: 325 (65 FE) TAB at 20:41

## 2018-06-11 RX ADMIN — PRAVASTATIN SODIUM 80 MG: 80 TABLET ORAL at 20:41

## 2018-06-11 RX ADMIN — DOXAZOSIN 4 MG: 4 TABLET ORAL at 20:40

## 2018-06-11 RX ADMIN — OXYCODONE HYDROCHLORIDE AND ACETAMINOPHEN 500 MG: 500 TABLET ORAL at 09:31

## 2018-06-11 RX ADMIN — ASPIRIN 81 MG: 81 TABLET ORAL at 09:31

## 2018-06-11 RX ADMIN — FERROUS SULFATE TAB 325 MG (65 MG ELEMENTAL FE) 325 MG: 325 (65 FE) TAB at 09:31

## 2018-06-11 RX ADMIN — ENOXAPARIN SODIUM 40 MG: 40 INJECTION, SOLUTION INTRAVENOUS; SUBCUTANEOUS at 20:40

## 2018-06-11 RX ADMIN — GUAIFENESIN 600 MG: 600 TABLET, EXTENDED RELEASE ORAL at 20:41

## 2018-06-11 RX ADMIN — DIPHENHYDRAMINE HCL 25 MG: 25 TABLET ORAL at 20:39

## 2018-06-11 RX ADMIN — OMEGA-3-ACID ETHYL ESTERS 1 CAPSULE: 1 CAPSULE, LIQUID FILLED ORAL at 09:31

## 2018-06-11 RX ADMIN — ACETAMINOPHEN 1000 MG: 500 TABLET ORAL at 09:30

## 2018-06-11 RX ADMIN — GUAIFENESIN 600 MG: 600 TABLET, EXTENDED RELEASE ORAL at 09:31

## 2018-06-11 RX ADMIN — FINASTERIDE 5 MG: 5 TABLET, FILM COATED ORAL at 09:31

## 2018-06-11 RX ADMIN — METOPROLOL TARTRATE 25 MG: 25 TABLET ORAL at 20:41

## 2018-06-11 ASSESSMENT — PAIN SCALES - GENERAL
PAINLEVEL_OUTOF10: 7
PAINLEVEL_OUTOF10: 2
PAINLEVEL_OUTOF10: 0
PAINLEVEL_OUTOF10: 0
PAINLEVEL_OUTOF10: 10
PAINLEVEL_OUTOF10: 6
PAINLEVEL_OUTOF10: 0

## 2018-06-11 ASSESSMENT — PAIN DESCRIPTION - PAIN TYPE: TYPE: ACUTE PAIN

## 2018-06-11 ASSESSMENT — PAIN DESCRIPTION - ORIENTATION: ORIENTATION: RIGHT

## 2018-06-11 ASSESSMENT — PAIN DESCRIPTION - LOCATION: LOCATION: SHOULDER

## 2018-06-11 NOTE — PROGRESS NOTES
planes sitting in w/c      Activity Tolerance:  Activity Tolerance: Patient limited by fatigue;Treatment limited secondary to decreased cognition    Assessment:     Performance deficits / Impairments: Decreased functional mobility , Decreased safe awareness, Decreased balance, Decreased ADL status, Decreased cognition, Decreased ROM, Decreased endurance, Decreased strength, Decreased high-level IADLs  Prognosis: Poor  Discharge Recommendations: Patient would benefit from continued therapy after discharge, 24 hour supervision or assist    Patient Education:  Patient Education: Caring for R UE  Barriers to Learning: Cognition     Equipment Recommendations:  Equipment Needed: No  Other: Continue to monitor. Safety:  Safety Devices in place: Yes  Type of devices: All fall risk precautions in place, Call light within reach, Patient at risk for falls, Nurse notified, Gait belt, Chair alarm in place, Left in chair (Telesitter)    Plan:  Times per week: 6x   Current Treatment Recommendations: Endurance Training, Patient/Caregiver Education & Training, Equipment Evaluation, Education, & procurement, Self-Care / ADL, Balance Training, Functional Mobility Training, Safety Education & Training, ROM    Goals:  Patient goals : Decreased R shoulder pain    Short term goals  Time Frame for Short term goals: 1 week  Short term goal 1: Pt will complete ADL routine with no  >mod cues for safe and adapted tech within shoulder precations and Mod A to increase independence in self care routine  Short term goal 2: Pt will complte basic transfers with no >CGA and min cues for claire walker tech to increase independence with toileting. Short term goal 3: Pt will tolerate standing x 5 min with SBA to increase endurance for sinkside AD routine. Short term goal 4: Pt/ family will assist with donning sling with no > min A and mod cues to increase independene in self care tasks.    Long term goals  Time Frame for Long term goals : 2 weeks

## 2018-06-11 NOTE — PROGRESS NOTES
surfaces. Short term goal 3: Pt to ambulate 100' with L hemiwalker at [facility] for walking household distances. Short term goal 4: Pt to negotiate two 6\" steps with 1 handrail at [facility] for entering and exiting the home. Long term goals  Time Frame for Long term goals : 3 weeks  Long term goal 1: Pt to perform bed mobility at [facility] while maintaining UE WB restrictions for getting in and out of bed. Long term goal 2: Pt to transfer at S while maintaining UE WB restrictions in order to get up and down from various surfaces. Long term goal 3: Pt to ambulate 150' with L hemiwalker at [facility] for walking household distances. Long term goal 4: Pt to negotiate two 6\" steps with 1 handrail at [facility] for entering and exiting the home. Long term goal 5: Pt to perform car transfer at [facility] for safe transfer home.

## 2018-06-11 NOTE — PROGRESS NOTES
Use:  Patient not on antibiotics    Psychotropic Medication Use:  Patient not on psychotropic medications. Oxygen Use: No    Home Medications Reconciled: N/A    Physical Therapy:   Bed Mobility  Scooting: Moderate assistance (to scoot hip to edge of bed. Patient fearful of falling. Patient able to scoot hips laterally in supine and posteriorly in sitting. )  Transfers  Sit to Stand: Minimal Assistance  Stand to sit: Contact guard assistance  Stand Pivot Transfers: Contact guard assistance (using hemiwalker. )  Ambulation 1  Surface: level tile  Device: Hemiwalker  Assistance: Contact guard assistance  Quality of Gait: Slow pace. Short step length. Downward gaze unless cued to look outward. No LOB . Mild unsteadiness. Distance: 50 ft x 1   Comments: Patient holding hemiwalker to left side. Good sequencing. PT Equipment Recommendations  Other: continue to monitor, unsure of DME at home    Occupational  Therapy:   ADL  Grooming: Minimal assistance (To wash/dry face while sitting up in recliner. )  UE Bathing: Minimal assistance (To wash BUE while sitting. )  LE Bathing: Other (Comment) (Pt refused to complete and became agitated. )  LE Dressing: Dependent/Total (Pt became agitated when adjusting B socks. and doff brief)  Toileting: Contact guard assistance (x 3 min during toileting )       Speech Therapy:       Nutrition:    Weight: 142 lb 8 oz (64.6 kg) / Body mass index is 26.06 kg/m². Please see nutrition note for details.     Family Education: No family available for education  Fall Risk:  Falling star program initiated    Goal Achievement:   Patient Continues to progress toward goal achievement    Discharge Plan   Estimated Length of Stay: re eval  Destination: discharge home with supervision  Services at Discharge: [address] [place] [address], Occupational Therapy and Nursing 3x week  Equipment at Discharge: Needs: unknown  Factors facilitating achievement of predicted outcomes: Family support and

## 2018-06-11 NOTE — PLAN OF CARE
Problem: Activity:  Goal: Physical symptoms of sleep deprivation will improve  Physical symptoms of sleep deprivation will improve   Outcome: Ongoing  Noted to be resting with eyes closed at times. Unable to recall quality of sleep overnight. Problem: Bleeding:  Goal: Will show no signs and symptoms of excessive bleeding  Will show no signs and symptoms of excessive bleeding   Outcome: Ongoing  No s/s noted at this time. Will continue to monitor. Problem: Pain:  Goal: Pain level will decrease  Pain level will decrease   Outcome: Ongoing  Pain managed appropriately with PRN medication per order. No issues noted at this time. Problem: Skin Integrity:  Goal: Risk for impaired skin integrity will decrease  Risk for impaired skin integrity will decrease   Outcome: Ongoing  No new skin issues noted at this time. Repositioned frequently. See flow sheet for findings. Problem: Falls - Risk of:  Goal: Will remain free from falls  Will remain free from falls   Outcome: Ongoing  Free from falls at this time. Fall band, sign, and nonskid footwear in place. Problem: Nutrition  Goal: Optimal nutrition therapy  Outcome: Ongoing  Appetite appropriate, fluids encouraged.

## 2018-06-12 PROCEDURE — 1290000000 HC SEMI PRIVATE OTHER R&B

## 2018-06-12 PROCEDURE — 97535 SELF CARE MNGMENT TRAINING: CPT

## 2018-06-12 PROCEDURE — 97116 GAIT TRAINING THERAPY: CPT

## 2018-06-12 PROCEDURE — 6360000002 HC RX W HCPCS: Performed by: INTERNAL MEDICINE

## 2018-06-12 PROCEDURE — 97530 THERAPEUTIC ACTIVITIES: CPT

## 2018-06-12 PROCEDURE — 6370000000 HC RX 637 (ALT 250 FOR IP): Performed by: FAMILY MEDICINE

## 2018-06-12 PROCEDURE — 6370000000 HC RX 637 (ALT 250 FOR IP): Performed by: INTERNAL MEDICINE

## 2018-06-12 PROCEDURE — 97110 THERAPEUTIC EXERCISES: CPT

## 2018-06-12 RX ADMIN — DOXAZOSIN 4 MG: 4 TABLET ORAL at 22:19

## 2018-06-12 RX ADMIN — FLUTICASONE PROPIONATE 1 SPRAY: 50 SPRAY, METERED NASAL at 11:20

## 2018-06-12 RX ADMIN — PRAVASTATIN SODIUM 80 MG: 80 TABLET ORAL at 22:19

## 2018-06-12 RX ADMIN — OMEGA-3-ACID ETHYL ESTERS 1 CAPSULE: 1 CAPSULE, LIQUID FILLED ORAL at 11:18

## 2018-06-12 RX ADMIN — ACETAMINOPHEN 1000 MG: 500 TABLET ORAL at 22:20

## 2018-06-12 RX ADMIN — ACETAMINOPHEN 1000 MG: 500 TABLET ORAL at 11:18

## 2018-06-12 RX ADMIN — GUAIFENESIN 600 MG: 600 TABLET, EXTENDED RELEASE ORAL at 22:19

## 2018-06-12 RX ADMIN — GUAIFENESIN 600 MG: 600 TABLET, EXTENDED RELEASE ORAL at 11:18

## 2018-06-12 RX ADMIN — ASPIRIN 81 MG: 81 TABLET ORAL at 11:18

## 2018-06-12 RX ADMIN — FERROUS SULFATE TAB 325 MG (65 MG ELEMENTAL FE) 325 MG: 325 (65 FE) TAB at 11:18

## 2018-06-12 RX ADMIN — OXYCODONE HYDROCHLORIDE AND ACETAMINOPHEN 500 MG: 500 TABLET ORAL at 11:17

## 2018-06-12 RX ADMIN — METOPROLOL TARTRATE 25 MG: 25 TABLET ORAL at 11:17

## 2018-06-12 ASSESSMENT — PAIN SCALES - WONG BAKER: WONGBAKER_NUMERICALRESPONSE: 4

## 2018-06-12 ASSESSMENT — PAIN SCALES - GENERAL
PAINLEVEL_OUTOF10: 6
PAINLEVEL_OUTOF10: 5
PAINLEVEL_OUTOF10: 5

## 2018-06-12 ASSESSMENT — PAIN DESCRIPTION - LOCATION: LOCATION: ABDOMEN;SHOULDER

## 2018-06-12 NOTE — PROGRESS NOTES
Patient has been sexually inappropriate with this nurse today. Has needed multiple times of redirection without change.

## 2018-06-12 NOTE — PLAN OF CARE
MD   25 mg at 06/11/18 2039    enoxaparin (LOVENOX) injection 40 mg  40 mg Subcutaneous Q24H Shelli Ziegler MD   40 mg at 06/11/18 2040    magnesium hydroxide (MILK OF MAGNESIA) 400 MG/5ML suspension 30 mL  30 mL Oral Daily PRN Shelli Ziegler MD        aspirin EC tablet 81 mg  81 mg Oral Daily Shelli Ziegler MD   81 mg at 06/11/18 0931    docusate sodium (COLACE) capsule 100 mg  100 mg Oral BID Shelli Ziegler MD   100 mg at 06/11/18 2039    doxazosin (CARDURA) tablet 4 mg  4 mg Oral Nightly Shelli Ziegler MD   4 mg at 06/11/18 2040    ferrous sulfate tablet 325 mg  325 mg Oral BID Shelli Ziegler MD   325 mg at 06/11/18 2041    finasteride (PROSCAR) tablet 5 mg  5 mg Oral Daily Shelli Ziegler MD   5 mg at 06/11/18 0931    fluticasone (FLONASE) 50 MCG/ACT nasal spray 1 spray  1 spray Nasal Daily Kendall Stanford MD   Stopped at 06/09/18 1410    guaiFENesin (MUCINEX) extended release tablet 600 mg  600 mg Oral BID Shelli Ziegler MD   600 mg at 06/11/18 2041    hyoscyamine (OSCIMIN) dispersible tablet 125 mcg  125 mcg Oral Q4H PRN Shelli Ziegler MD        lidocaine (LIDODERM) 5 % 1 patch  1 patch Transdermal Q24H Shelli Ziegler MD   1 patch at 06/11/18 2015    metoprolol tartrate (LOPRESSOR) tablet 25 mg  25 mg Oral BID Kendall Stanford MD   25 mg at 06/11/18 2041    omega-3 acid ethyl esters (LOVAZA) capsule 1 capsule  1 capsule Oral Daily Shelli Ziegler MD   1 capsule at 06/11/18 0931    pantoprazole (PROTONIX) tablet 40 mg  40 mg Oral BID AC Shelli Ziegler MD   40 mg at 06/10/18 0529    ppd (tuberculin skin test) read   Does not apply Weekly Kendall Stanford MD        pravastatin (PRAVACHOL) tablet 80 mg  80 mg Oral Nightly Kendall Stanford MD   80 mg at 06/11/18 2041    tuberculin injection 5 Units  5 Units Intradermal Weekly Shelli Ziegler MD        vitamin C (ASCORBIC ACID) tablet 500 mg  500 mg Oral Daily Kendall Stanford MD   500 mg at 06/11/18 6478  acetaminophen (TYLENOL) tablet 1,000 mg  1,000 mg Oral Q6H Justino Flores MD   1,000 mg at 06/11/18 2039    senna (SENOKOT) tablet 8.6 mg  1 tablet Oral Nightly PRN Justino Flores MD        polyethylene glycol [facility]) packet 17 g  17 g Oral Daily PRN Justino Flores MD           Plan of Care Problems and Goals (a check in the box indicates current careplan problems and goals):     [x] Problem: Pain    Goal: Pain Level will decrease   [x] Problem: Falls- Risk of    Goal: Absence of Falls   [x] Problem: Risk of Impaired Skin Integrity    Goal: Will show no infection signs and symptoms    Goal: Absence of new skin breakdown   [x] Problem: Risk of Bleeding    Goal: Will show no signs and symptoms of excessive bleeding     [x] Problem: Nutrition    Goal: Optimal nutrition therapy   [x] Problem: Impaired Mobility    Goal: Mobility will improve   [x] Problem: Discharge Barriers    Goal: Patient's continuum of care needs are met   [x] Problem: Sleep Pattern Disturbance    Goal: Physical symptoms of sleep deprivation will improve

## 2018-06-12 NOTE — PLAN OF CARE
Problem: Activity:  Goal: Physical symptoms of sleep deprivation will improve  Physical symptoms of sleep deprivation will improve   Outcome: Ongoing  Patient did not sleep well last night and has not rested this shift so far. Has been calling out for wife and very confused this shift. Goal: Sleeping patterns will improve  Sleeping patterns will improve   Outcome: Ongoing      Problem: Bleeding:  Goal: Will show no signs and symptoms of excessive bleeding  Will show no signs and symptoms of excessive bleeding   Outcome: Ongoing  No signs or symptoms of bleeding observed by this nurse or indicated via labs.  DVT prophylaxis       Problem: Pain:  Goal: Pain level will decrease  Pain level will decrease   Outcome: Ongoing

## 2018-06-12 NOTE — PROGRESS NOTES
[address] [address]  INPATIENT PHYSICAL THERAPY  DAILY NOTE  STRZ U 8E - 8E-67/067-A    Time In: 1350  Time Out: 1434  Timed Code Treatment Minutes: 40 Minutes  Minutes: 44          Date: 2018  Patient Name: Gillie Hodgkin,  Gender:  male        MRN: [id number]  : 1934  (80 y.o.)  Referral Date : 18  Referring Practitioner: Ordering: Marino Garza MD Attending: GM Cartwright MD  Diagnosis: R humerus fracture s/p fall  Additional Pertinent Hx: Gillie Hodgkin is a 80 y.o. male who presents to the Emergency Department via EMS for the evaluation of a fall. Patient reports a history of hypertension, hyperlipidemia, CVA, CAD, coronary angioplasty with stent placement, cardiac catheterization, and colectomy. He states that he slipped while walking down his stairs and landed on his right shoulder. He denies that he hit his head or lost consciousness, but reports that he is on Plavix. Patient rates his right shoulder pain at a 9/10 in severity, and states that the pain is sharp and aching in quality. He reports that the pain is continuous in duration. Patient denies experiencing any chest pain, neck pain, headache, visual changes, weakness, numbness, tingling, fever, chills, dizziness, lightheadedness, or cough. He reports that he ate breakfast this morning. He denies any history of previous right shoulder injuries. Patient denies further complaints at initial encounter. Non surgical treatment by ortho. To TCU on .      Past Medical History:   Diagnosis Date    An[facility])     Arthritis     knees    CAD (coronary artery disease)     CVA (cerebral infarction)     Hyperlipidemia     Hypertension     Iron deficiency anemia     Stroke (cerebrum) ([facility] [address]      Past Surgical History:   Procedure Laterality Date    [facility]  ,    [facility]    COLECTOMY  2017    Sigmoid Colon Resection, Colostomy, Mobilization Splenic Flexure, Drainage of LLQ Abcess Exploratory Laparotomy - Dr. Rubin File  2009    [facility] CATH LAB PROCEDURE         Restrictions/Precautions:  Weight Bearing, General Precautions, Fall Risk, Contact Precautions                 Right Upper Extremity Weight Bearing: Non Weight Bearing  Other position/activity restrictions: Pt has a h/o of an anoxic brain injury  Right Upper Extremity Brace/Splint: Sling    Prior Level of Function:  ADL Assistance: Independent  Homemaking Assistance: Independent  Ambulation Assistance: Independent  Transfer Assistance: Independent  Additional Comments: all information gathered from chart review; spouse or caregiver not present and pt not able to appropriately answer questions d/t history of dementia    Subjective:     Subjective: Patient sitting in recliner upon arrival. Patient pleasantly confused. Patient agreeable to therapy with Min encouragement. Therapist donned sling while patient in chair. Pain:  Yes. Pain Assessment  Pain Assessment: Faces       Social/Functional:  Lives With: Spouse  Type of Home: House  Home Layout: Two level, 1/2 bath on main level, Bed/Bath upstairs  Home Access: Stairs to enter with rails  Entrance Stairs - Number of Steps: 1 railing with 2 steps to enter     Objective:  Sit to Supine: Minimal assistance (bed flat, no rail, Min A to elevate BLE onto bed)  Scooting: Contact guard assistance; Moderate assistance (Mod A to scoot to edge of chair, CGA to scoot back onto bed)    Transfers  Sit to Stand: Minimal Assistance (chair, w/c)  Stand to sit: Contact guard assistance       Ambulation 1  Surface: level tile  Device: Hemiwalker  Assistance: Contact guard assistance  Quality of Gait: slow pace, short step length B, downward gaze, mild unsteady, no LOB, cues for proper position of hemiwalker  Distance: 6ft x1, 4ft x1  Comments: patient complained of B knee pain

## 2018-06-13 PROCEDURE — A5063 DRAIN OSTOMY POUCH W/FLANGE: HCPCS

## 2018-06-13 PROCEDURE — 1290000000 HC SEMI PRIVATE OTHER R&B

## 2018-06-13 PROCEDURE — A4390 DRAINABLE PCH EX WEAR CONVEX: HCPCS

## 2018-06-13 PROCEDURE — 6360000002 HC RX W HCPCS: Performed by: INTERNAL MEDICINE

## 2018-06-13 PROCEDURE — 6370000000 HC RX 637 (ALT 250 FOR IP): Performed by: INTERNAL MEDICINE

## 2018-06-13 PROCEDURE — 6370000000 HC RX 637 (ALT 250 FOR IP): Performed by: FAMILY MEDICINE

## 2018-06-13 PROCEDURE — A4409 OST SKN BARR CONVEX <=4 SQ I: HCPCS

## 2018-06-13 PROCEDURE — 97110 THERAPEUTIC EXERCISES: CPT

## 2018-06-13 RX ADMIN — ACETAMINOPHEN 1000 MG: 500 TABLET ORAL at 09:20

## 2018-06-13 RX ADMIN — PRAVASTATIN SODIUM 80 MG: 80 TABLET ORAL at 23:45

## 2018-06-13 RX ADMIN — METOPROLOL TARTRATE 25 MG: 25 TABLET ORAL at 23:44

## 2018-06-13 RX ADMIN — METOPROLOL TARTRATE 25 MG: 25 TABLET ORAL at 09:21

## 2018-06-13 RX ADMIN — GUAIFENESIN 600 MG: 600 TABLET, EXTENDED RELEASE ORAL at 09:21

## 2018-06-13 RX ADMIN — ACETAMINOPHEN 1000 MG: 500 TABLET ORAL at 23:44

## 2018-06-13 RX ADMIN — PANTOPRAZOLE SODIUM 40 MG: 40 TABLET, DELAYED RELEASE ORAL at 05:54

## 2018-06-13 RX ADMIN — DOCUSATE SODIUM 100 MG: 100 CAPSULE, LIQUID FILLED ORAL at 09:21

## 2018-06-13 RX ADMIN — ACETAMINOPHEN 1000 MG: 500 TABLET ORAL at 04:50

## 2018-06-13 RX ADMIN — GUAIFENESIN 600 MG: 600 TABLET, EXTENDED RELEASE ORAL at 23:44

## 2018-06-13 RX ADMIN — FERROUS SULFATE TAB 325 MG (65 MG ELEMENTAL FE) 325 MG: 325 (65 FE) TAB at 09:21

## 2018-06-13 RX ADMIN — ENOXAPARIN SODIUM 40 MG: 40 INJECTION, SOLUTION INTRAVENOUS; SUBCUTANEOUS at 23:45

## 2018-06-13 RX ADMIN — FERROUS SULFATE TAB 325 MG (65 MG ELEMENTAL FE) 325 MG: 325 (65 FE) TAB at 23:44

## 2018-06-13 RX ADMIN — ACETAMINOPHEN 1000 MG: 500 TABLET ORAL at 16:24

## 2018-06-13 RX ADMIN — PANTOPRAZOLE SODIUM 40 MG: 40 TABLET, DELAYED RELEASE ORAL at 16:24

## 2018-06-13 RX ADMIN — DOXAZOSIN 4 MG: 4 TABLET ORAL at 23:44

## 2018-06-13 RX ADMIN — OXYCODONE HYDROCHLORIDE AND ACETAMINOPHEN 500 MG: 500 TABLET ORAL at 09:21

## 2018-06-13 RX ADMIN — ASPIRIN 81 MG: 81 TABLET ORAL at 09:21

## 2018-06-13 RX ADMIN — DIPHENHYDRAMINE HCL 25 MG: 25 TABLET ORAL at 23:45

## 2018-06-13 RX ADMIN — FINASTERIDE 5 MG: 5 TABLET, FILM COATED ORAL at 09:21

## 2018-06-13 ASSESSMENT — PAIN DESCRIPTION - ORIENTATION: ORIENTATION: RIGHT

## 2018-06-13 ASSESSMENT — PAIN SCALES - GENERAL
PAINLEVEL_OUTOF10: 6
PAINLEVEL_OUTOF10: 8
PAINLEVEL_OUTOF10: 5
PAINLEVEL_OUTOF10: 7
PAINLEVEL_OUTOF10: 6

## 2018-06-13 ASSESSMENT — PAIN DESCRIPTION - LOCATION: LOCATION: SHOULDER;ARM

## 2018-06-13 NOTE — PROGRESS NOTES
Radha Nance 60  OCCUPATIONAL THERAPY MISSED TREATMENT NOTE  [facility] TCU 8E  8E-67/067-A      Date: 2018  Patient Name: Roya Pastrana        CSN: [id number]   : 1934  (80 y.o.)  Gender: male   Referring Practitioner: Ordering: Ashley Redman MD Attending: Dr. Radha Shafer  Diagnosis: humerus head fracture right closed          REASON FOR MISSED TREATMENT:  Patient refused treatment. Patient refused session. Max encouragement given with no success. Encouraged patient to sit up in chair, complete R UE ROM, shaving, Patient adamantly refused telling LAGOS \"You need to sit down. \"  Will check back as time allows

## 2018-06-13 NOTE — PROGRESS NOTES
[address] - 8E-67/067-A    Time In: 1301  Time Out: 1626  Timed Code Treatment Minutes: 18 Minutes  Minutes: 18          Date: 2018  Patient Name: Mary Carmen Costello,  Gender:  male        MRN: [id number]  : 1934  (80 y.o.)  Referral Date : 18  Referring Practitioner: Ordering: Molly Hernandez MD Attending: GM Rausch MD  Diagnosis: R humerus fracture s/p fall  Additional Pertinent Hx: Mary Carmen Costello is a 80 y.o. male who presents to the Emergency Department via EMS for the evaluation of a fall. Patient reports a history of hypertension, hyperlipidemia, CVA, CAD, coronary angioplasty with stent placement, cardiac catheterization, and colectomy. He states that he slipped while walking down his stairs and landed on his right shoulder. He denies that he hit his head or lost consciousness, but reports that he is on Plavix. Patient rates his right shoulder pain at a 9/10 in severity, and states that the pain is sharp and aching in quality. He reports that the pain is continuous in duration. Patient denies experiencing any chest pain, neck pain, headache, visual changes, weakness, numbness, tingling, fever, chills, dizziness, lightheadedness, or cough. He reports that he ate breakfast this morning. He denies any history of previous right shoulder injuries. Patient denies further complaints at initial encounter. Non surgical treatment by ortho. To TCU on .      Past Medical History:   Diagnosis Date    Anoxic brain injury [facility])     Arthritis     knees    CAD (coronary artery disease)     CVA (cerebral infarction)     Hyperlipidemia     Hypertension     Iron deficiency anemia     St[address] (cerebrum) ([facility] [address]      Past Surgical History:   Procedure Laterality Date    [facility]    COLECTOMY  2017    Sigmoid Colon Resection, Colostomy, Mobilization Splenic Flexure, Drainage of LLQ Abcess , Decreased strength, Decreased safe awareness, Decreased cognition, Decreased endurance, Decreased balance  Assessment: Pt tolerated therapy session poor. Pt refused donning of sling and ambulation. Pt demonstrates poor performance of all exercises with refusal to complete. Pt would benefit from continued skilled PT to improve balance and increase strength and endurance for return to PLOF. Prognosis: Fair  Discharge Recommendations: Continue to assess pending progress, Subacute/Skilled Nursing Facility, 24 hour supervision or assist, Patient would benefit from continued therapy after discharge    Patient Education:  Patient Education: importance of PT, importance of donning sling, therex    Equipment Recommendations: Other: continue to monitor, unsure of DME at home    Safety:  Type of devices: All fall risk precautions in place, Call light within reach, Chair alarm in place, Gait belt, Patient at risk for falls, Left in chair  Restraints  Initially in place: No    Plan:  Times per week: 6x  Times per day: Daily  Specific instructions for Next Treatment: gait training, balance training, stair negotiation, LE strengthening  Current Treatment Recommendations: Strengthening, Functional Mobility Training, Transfer Training, Endurance Training, Gait Training, Stair training, Safety Education & Training, Patient/Caregiver Education & Training, Equipment Evaluation, Education, & procurement    Goals:  Short term goals  Time Frame for Short term goals: 10 days  Short term goal 1: Pt to perform bed mobility at [facility] while maintaining UE WB restrictions for getting in and out of bed. Short term goal 2: Pt to transfer at [facility] while maintaining UE WB restrictions in order to get up and down from various surfaces. Short term goal 3: Pt to ambulate 100' with L hemiwalker at [facility] for walking household distances. Short term goal 4: Pt to negotiate two 6\" steps with 1 handrail at [facility] for entering and exiting the home.     Long

## 2018-06-14 PROCEDURE — 6370000000 HC RX 637 (ALT 250 FOR IP): Performed by: INTERNAL MEDICINE

## 2018-06-14 PROCEDURE — 6370000000 HC RX 637 (ALT 250 FOR IP): Performed by: FAMILY MEDICINE

## 2018-06-14 PROCEDURE — 97530 THERAPEUTIC ACTIVITIES: CPT

## 2018-06-14 PROCEDURE — 97116 GAIT TRAINING THERAPY: CPT

## 2018-06-14 PROCEDURE — 6360000002 HC RX W HCPCS: Performed by: INTERNAL MEDICINE

## 2018-06-14 PROCEDURE — 0220000000 HC SKILLED NURSING FACILITY

## 2018-06-14 PROCEDURE — 1290000000 HC SEMI PRIVATE OTHER R&B

## 2018-06-14 RX ADMIN — OMEGA-3-ACID ETHYL ESTERS 1 CAPSULE: 1 CAPSULE, LIQUID FILLED ORAL at 10:02

## 2018-06-14 RX ADMIN — METOPROLOL TARTRATE 25 MG: 25 TABLET ORAL at 23:16

## 2018-06-14 RX ADMIN — GUAIFENESIN 600 MG: 600 TABLET, EXTENDED RELEASE ORAL at 23:15

## 2018-06-14 RX ADMIN — ASPIRIN 81 MG: 81 TABLET ORAL at 09:58

## 2018-06-14 RX ADMIN — FLUTICASONE PROPIONATE 1 SPRAY: 50 SPRAY, METERED NASAL at 09:59

## 2018-06-14 RX ADMIN — FERROUS SULFATE TAB 325 MG (65 MG ELEMENTAL FE) 325 MG: 325 (65 FE) TAB at 23:16

## 2018-06-14 RX ADMIN — PANTOPRAZOLE SODIUM 40 MG: 40 TABLET, DELAYED RELEASE ORAL at 05:30

## 2018-06-14 RX ADMIN — FERROUS SULFATE TAB 325 MG (65 MG ELEMENTAL FE) 325 MG: 325 (65 FE) TAB at 09:58

## 2018-06-14 RX ADMIN — FINASTERIDE 5 MG: 5 TABLET, FILM COATED ORAL at 09:58

## 2018-06-14 RX ADMIN — ENOXAPARIN SODIUM 40 MG: 40 INJECTION, SOLUTION INTRAVENOUS; SUBCUTANEOUS at 23:55

## 2018-06-14 RX ADMIN — PRAVASTATIN SODIUM 80 MG: 80 TABLET ORAL at 23:15

## 2018-06-14 RX ADMIN — DIPHENHYDRAMINE HCL 25 MG: 25 TABLET ORAL at 23:16

## 2018-06-14 RX ADMIN — ACETAMINOPHEN 1000 MG: 500 TABLET ORAL at 23:16

## 2018-06-14 RX ADMIN — DOCUSATE SODIUM 100 MG: 100 CAPSULE, LIQUID FILLED ORAL at 10:02

## 2018-06-14 RX ADMIN — DOXAZOSIN 4 MG: 4 TABLET ORAL at 23:16

## 2018-06-14 RX ADMIN — ACETAMINOPHEN 1000 MG: 500 TABLET ORAL at 05:30

## 2018-06-14 RX ADMIN — GUAIFENESIN 600 MG: 600 TABLET, EXTENDED RELEASE ORAL at 09:59

## 2018-06-14 RX ADMIN — METOPROLOL TARTRATE 25 MG: 25 TABLET ORAL at 09:59

## 2018-06-14 RX ADMIN — OXYCODONE HYDROCHLORIDE AND ACETAMINOPHEN 500 MG: 500 TABLET ORAL at 09:59

## 2018-06-14 RX ADMIN — ACETAMINOPHEN 1000 MG: 500 TABLET ORAL at 10:02

## 2018-06-14 RX ADMIN — PANTOPRAZOLE SODIUM 40 MG: 40 TABLET, DELAYED RELEASE ORAL at 15:19

## 2018-06-14 ASSESSMENT — PAIN SCALES - GENERAL
PAINLEVEL_OUTOF10: 7
PAINLEVEL_OUTOF10: 6
PAINLEVEL_OUTOF10: 7

## 2018-06-14 NOTE — PLAN OF CARE
Problem: Activity:  Goal: Physical symptoms of sleep deprivation will improve  Physical symptoms of sleep deprivation will improve   Outcome: Ongoing  Patient continues to yell out through the night. Patient resting in bed this morning with eyes closed    Problem: Pain:  Goal: Pain level will decrease  Pain level will decrease   Outcome: Ongoing  Patient does not verbalize pain but groins with movement and faces made. Problem: Skin Integrity:  Goal: Risk for impaired skin integrity will decrease  Risk for impaired skin integrity will decrease   Outcome: Ongoing  Turned every 2 hours this shift. No new skin breakdown. Comments: Care plan reviewed with patient. Patient verbalize understanding of the plan of care and contribute to goal setting.

## 2018-06-14 NOTE — PROGRESS NOTES
Radha Munoz  OCCUPATIONAL THERAPY MISSED TREATMENT NOTE  [facility] 8E  8E-67/067-A      Date: 2018  Patient Name: Vannesa Guzmán        CSN: [id number]   : 1934  (80 y.o.)  Gender: male   Referring Practitioner: Ordering: Cesar Rosario MD Attending: Dr. Alok Aguilar  Diagnosis: humerus head fracture right closed          REASON FOR MISSED TREATMENT:  Patient refused treatment. Patient adamantly refused OT session, Encouraged patient to sit up in bedside chair, patient nearly kicked LAGOS, threw call light and ordered LAGOS to leave room. Wife was called with no answer.  Will check back as time allows

## 2018-06-14 NOTE — PROGRESS NOTES
[facility]  INPATIENT PHYSICAL THERAPY  DAILY NOTE  [facility] U 8E - 8E-67/067-A    Time In: 902  Time Out: 933  Timed Code Treatment Minutes: 32 Minutes  Minutes: 31          Date: 2018  Patient Name: Bebe Muhammad,  Gender:  male        MRN: [id number]  : 1934  (80 y.o.)  Referral Date : 18  Referring Practitioner: Ordering: Shaji Contreras MD Attending: GM Nam MD  Diagnosis: R humerus fracture s/p fall  Additional Pertinent Hx: Bebe Muhammad is a 80 y.o. male who presents to the Emergency Department via EMS for the evaluation of a fall. Patient reports a history of hypertension, hyperlipidemia, CVA, CAD, coronary angioplasty with stent placement, cardiac catheterization, and colectomy. He states that he slipped while walking down his stairs and landed on his right shoulder. He denies that he hit his head or lost consciousness, but reports that he is on Plavix. Patient rates his right shoulder pain at a 9/10 in severity, and states that the pain is sharp and aching in quality. He reports that the pain is continuous in duration. Patient denies experiencing any chest pain, neck pain, headache, visual changes, weakness, numbness, tingling, fever, chills, dizziness, lightheadedness, or cough. He reports that he ate breakfast this morning. He denies any history of previous right shoulder injuries. Patient denies further complaints at initial encounter. Non surgical treatment by ortho. To TCU on .      Past Medical History:   Diagnosis Date    [facility]oxic brain injury [facility])     Arthritis     knees    CAD (coronary artery disease)     CVA (cerebral infarction)     Hyperlipidemia     Hypertension     Iron deficiency anemia     Stroke (cerebrum) ([facility] [address]      Past Surgical History:   Procedure Laterality Date    BACK SURGERY  ,    [facility]    COLECTOMY  2017    Sigmoid Colon Resection, Colostomy, Mobilization Splenic Flexure, Drainage of LLQ Abcess placement)       Ambulation 1  Surface: level tile  Device: LUX Assure  Other Apparatus: Left (pt declined sling for R UE)  Assistance: Contact guard assistance  Quality of Gait: slowed dori, short step length, narrow EDDI, forward flexed posture, R UE flexed and horizontally adducted to chest, unsteady no LOB  Distance: 15'x2  Comments: patient complained of B knee pain       Balance  Comments: static stand at toilet to void ~4' pt occasionally unsteady no real LOB, needed assist with clothing management. Other Activities  Comment: assited pt with setting up his breakfast tray as he was unable to cut it to eat himself    Exercises:  Exercises  Comments: supine BLE heel cord stretch, dorsiflexor stretch, attempted hip adductor stretch however pt c/o pain and declines. PTA then encouraged pt to perform 1x5 reps hip ab/add, ankle pumps, and heel slides. pt then stating \"Thats enough\" and declined further therex due to \"tightness in the bones and fatigue\" Pt repoting he cannot exercise \"in this condition\" educated pt on role of PT        Activity Tolerance:  Activity Tolerance: Patient limited by fatigue;Patient limited by pain; Patient limited by cognitive status    Assessment: Body structures, Functions, Activity limitations: Decreased functional mobility , Decreased strength, Decreased endurance, Decreased balance, Decreased cognition  Assessment: pt tolerated session fair. pt slow moving, guarded and groaning in pain throughout session. Pt politely declines therex and stretching this date.  pt would benefit from cont skilled PT to improve defciits in mobility, strength, balance all to return to PLOF  Prognosis: Fair  Discharge Recommendations: Continue to assess pending progress, Subacute/Skilled Nursing Facility, 24 hour supervision or assist, Patient would benefit from continued therapy after discharge    Patient Education:  Patient Education: role of PT, gait, transfers, therex, sling    Equipment Recommendations: Other: continue to monitor, unsure of DME at home    Safety:  Type of devices: All fall risk precautions in place, Call light within reach, Chair alarm in place, Gait belt, Patient at risk for falls, Left in chair  Restraints  Initially in place: No    Plan:  Times per week: 6x  Times per day: Daily  Specific instructions for Next Treatment: gait training, balance training, stair negotiation, LE strengthening  Current Treatment Recommendations: Strengthening, Functional Mobility Training, Transfer Training, Endurance Training, Gait Training, Stair training, Safety Education & Training, Patient/Caregiver Education & Training, Equipment Evaluation, Education, & procurement    Goals:       Short term goals  Time Frame for Short term goals: 10 days  Short term goal 1: Pt to perform bed mobility at [facility] while maintaining UE WB restrictions for getting in and out of bed. Short term goal 2: Pt to transfer at [facility] while maintaining UE WB restrictions in order to get up and down from various surfaces. Short term goal 3: Pt to ambulate 100' with L hemiwalker at [facility] for walking household distances. Short term goal 4: Pt to negotiate two 6\" steps with 1 handrail at [facility] for entering and exiting the home. Long term goals  Time Frame for Long term goals : 3 weeks  Long term goal 1: Pt to perform bed mobility at [facility] while maintaining UE WB restrictions for getting in and out of bed. Long term goal 2: Pt to transfer at S while maintaining UE WB restrictions in order to get up and down from various surfaces. Long term goal 3: Pt to ambulate 150' with L hemiwalker at [facility] for walking household distances. Long term goal 4: Pt to negotiate two 6\" steps with 1 handrail at [facility] for entering and exiting the home. Long term goal 5: Pt to perform car transfer at [facility] for safe transfer home.

## 2018-06-15 PROCEDURE — 86580 TB INTRADERMAL TEST: CPT

## 2018-06-15 PROCEDURE — 1290000000 HC SEMI PRIVATE OTHER R&B

## 2018-06-15 PROCEDURE — 6370000000 HC RX 637 (ALT 250 FOR IP): Performed by: INTERNAL MEDICINE

## 2018-06-15 PROCEDURE — 6360000002 HC RX W HCPCS: Performed by: INTERNAL MEDICINE

## 2018-06-15 PROCEDURE — 2500000003 HC RX 250 WO HCPCS: Performed by: INTERNAL MEDICINE

## 2018-06-15 PROCEDURE — 97530 THERAPEUTIC ACTIVITIES: CPT

## 2018-06-15 PROCEDURE — 97535 SELF CARE MNGMENT TRAINING: CPT

## 2018-06-15 PROCEDURE — 6370000000 HC RX 637 (ALT 250 FOR IP): Performed by: FAMILY MEDICINE

## 2018-06-15 PROCEDURE — 97110 THERAPEUTIC EXERCISES: CPT

## 2018-06-15 RX ADMIN — OXYCODONE HYDROCHLORIDE AND ACETAMINOPHEN 500 MG: 500 TABLET ORAL at 09:10

## 2018-06-15 RX ADMIN — DOCUSATE SODIUM 100 MG: 100 CAPSULE, LIQUID FILLED ORAL at 09:10

## 2018-06-15 RX ADMIN — OMEGA-3-ACID ETHYL ESTERS 1 CAPSULE: 1 CAPSULE, LIQUID FILLED ORAL at 09:13

## 2018-06-15 RX ADMIN — ACETAMINOPHEN 1000 MG: 500 TABLET ORAL at 15:56

## 2018-06-15 RX ADMIN — GUAIFENESIN 600 MG: 600 TABLET, EXTENDED RELEASE ORAL at 09:10

## 2018-06-15 RX ADMIN — PANTOPRAZOLE SODIUM 40 MG: 40 TABLET, DELAYED RELEASE ORAL at 09:11

## 2018-06-15 RX ADMIN — FLUTICASONE PROPIONATE 1 SPRAY: 50 SPRAY, METERED NASAL at 09:10

## 2018-06-15 RX ADMIN — ACETAMINOPHEN 1000 MG: 500 TABLET ORAL at 09:11

## 2018-06-15 RX ADMIN — ACETAMINOPHEN 1000 MG: 500 TABLET ORAL at 03:10

## 2018-06-15 RX ADMIN — ACETAMINOPHEN 1000 MG: 500 TABLET ORAL at 21:36

## 2018-06-15 RX ADMIN — PANTOPRAZOLE SODIUM 40 MG: 40 TABLET, DELAYED RELEASE ORAL at 15:56

## 2018-06-15 RX ADMIN — ENOXAPARIN SODIUM 40 MG: 40 INJECTION, SOLUTION INTRAVENOUS; SUBCUTANEOUS at 21:35

## 2018-06-15 RX ADMIN — GUAIFENESIN 600 MG: 600 TABLET, EXTENDED RELEASE ORAL at 21:36

## 2018-06-15 RX ADMIN — Medication 5 UNITS: at 10:28

## 2018-06-15 RX ADMIN — FINASTERIDE 5 MG: 5 TABLET, FILM COATED ORAL at 09:10

## 2018-06-15 RX ADMIN — DOXAZOSIN 4 MG: 4 TABLET ORAL at 21:35

## 2018-06-15 RX ADMIN — FERROUS SULFATE TAB 325 MG (65 MG ELEMENTAL FE) 325 MG: 325 (65 FE) TAB at 21:36

## 2018-06-15 RX ADMIN — FERROUS SULFATE TAB 325 MG (65 MG ELEMENTAL FE) 325 MG: 325 (65 FE) TAB at 09:10

## 2018-06-15 RX ADMIN — METOPROLOL TARTRATE 25 MG: 25 TABLET ORAL at 09:10

## 2018-06-15 RX ADMIN — PRAVASTATIN SODIUM 80 MG: 80 TABLET ORAL at 21:36

## 2018-06-15 RX ADMIN — ASPIRIN 81 MG: 81 TABLET ORAL at 09:10

## 2018-06-15 RX ADMIN — METOPROLOL TARTRATE 25 MG: 25 TABLET ORAL at 21:35

## 2018-06-15 ASSESSMENT — PAIN SCALES - WONG BAKER
WONGBAKER_NUMERICALRESPONSE: 6
WONGBAKER_NUMERICALRESPONSE: 6

## 2018-06-15 ASSESSMENT — PAIN DESCRIPTION - PAIN TYPE
TYPE: ACUTE PAIN

## 2018-06-15 ASSESSMENT — PAIN DESCRIPTION - LOCATION
LOCATION: SHOULDER
LOCATION: ARM;SHOULDER
LOCATION: SHOULDER
LOCATION: SHOULDER

## 2018-06-15 ASSESSMENT — PAIN DESCRIPTION - DESCRIPTORS
DESCRIPTORS: SHARP
DESCRIPTORS: ACHING

## 2018-06-15 ASSESSMENT — PAIN SCALES - GENERAL
PAINLEVEL_OUTOF10: 8
PAINLEVEL_OUTOF10: 8
PAINLEVEL_OUTOF10: 7
PAINLEVEL_OUTOF10: 7
PAINLEVEL_OUTOF10: 10
PAINLEVEL_OUTOF10: 7
PAINLEVEL_OUTOF10: 8

## 2018-06-15 ASSESSMENT — PAIN DESCRIPTION - ORIENTATION
ORIENTATION: RIGHT

## 2018-06-15 ASSESSMENT — PAIN DESCRIPTION - FREQUENCY: FREQUENCY: CONTINUOUS

## 2018-06-15 ASSESSMENT — PAIN DESCRIPTION - ONSET: ONSET: ON-GOING

## 2018-06-15 NOTE — PROGRESS NOTES
safe awareness, Decreased balance, Decreased ADL status, Decreased cognition, Decreased ROM, Decreased endurance, Decreased strength, Decreased high-level IADLs  Assessment: Pt has made limited progress towards goals. Pt has met 0/4 STGs and 0/2 LTGs. Progress limited secondary to cognition. Pt continues to exhibit decreased balance, endurance, activity tolerance and safety awareness causing barriers to meeting pt's goals. Pt continues to require skilled OT intervention to improve ADl perforamcne required for pt to return home at [facility]. Prognosis: Poor  Discharge Recommendations: Patient would benefit from continued therapy after discharge, 24 hour supervision or assist    Patient Education:  Patient Education: safety [facility] mobility, importance of therapy  Barriers to Learning: Cognition     Equipment Recommendations:  Equipment Needed: No  Other: Continue to monitor. Safety:  Safety Devices in place: Yes  Type of devices: All fall risk precautions in place, Call light within reach, Patient at risk for falls, Nurse notified, Gait belt, Chair alarm in place, Left in chair    Plan:  Times per week: 6x   Current Treatment Recommendations: Endurance Training, Patient/Caregiver Education & Training, Equipment Evaluation, Education, & procurement, Self-Care / ADL, Balance Training, Functional Mobility Training, Safety Education & Training, ROM    Goals:  Patient goals : Decreased R shoulder pain    Short term goals  Time Frame for Short term goals: 1 week  Short term goal 1: Pt will complete ADL routine with no  >mod cues for safe and adapted tech within shoulder precations and Mod A to increase independence in self care routine NOT MET, CONTINUE  Short term goal 2: Pt will complte basic transfers with no >CGA and min cues for claire walker tech to increase independence with toileting. NOT MET, CONTINUE  Short term goal 3: Pt will tolerate standing x 5 min with SBA to increase endurance for sinkside AD routine.  NOT MET, CONTINUE  Short term goal 4: Pt/ family will assist with donning sling with no > min A and mod cues to increase independene in self care tasks. NOT MET, CONTINUE  Long term goals  Time Frame for Long term goals : 2 weeks   Long term goal 1: Pt will complete RUE distal AROM and LUE light resistive exercises with mod vc to increase ease of transfers and self cares. NOT MET, CONTINUE  Long term goal 2: Pt will complete functional mobility to/from therapy gym with CGA and mod vc for safety to increase indep with self cares.  NOT MET, CONTINUE

## 2018-06-15 NOTE — PROGRESS NOTES
follow  Assistance: Contact guard assistance  Quality of Gait: very slow velocity, shortened B step length, narrow EDDI, forward flexed posture, downward gaze, mild unsteadiness with no LOB  Distance: 70'x1, 100'x1  Comments: pt c/o R shoulder and B knee pain; manual assist for Hemiwalker placement x3    Exercises:  Exercises  Comments: Pt performed seated BLE ankle pumps, marches, LAQs, hip abduction, hip adduction with ball between knees, and glute sets x15 reps each - verbal, visual, and tactile cues for proper performance; min encouragement for participation. Pt frequently asking \"when do we go back to offices? \"      Activity Tolerance:  Activity Tolerance: Patient limited by cognitive status; Patient limited by pain    Assessment: Body structures, Functions, Activity limitations: Decreased functional mobility , Decreased strength, Decreased endurance, Decreased balance, Decreased cognition  Assessment: Pt tolerated therapy session fairly well. Pt presents with R shoulder pain secondary to R humerus fracture and B knee pain. Pt requires increased time to complete all activities and is guarded throughout. Pt met 1/4 STGs; unable to assess stair negotiation d/t safety concerns; unable to assess bed mobility. Pt limited by pain and cognitive status which has limited his PT progress. Further therapy sessions will focus on increasing LE strength and improving endurance and standing balance. Pt would benefit from continued skilled PT to enhance functional mobility. Prognosis: Fair  Discharge Recommendations: Continue to assess pending progress, Subacute/Skilled Nursing Facility, 24 hour supervision or assist, Patient would benefit from continued therapy after discharge    Patient Education:  Patient Education: role of PT, therex, transfers, gait    Equipment Recommendations: Other: continue to monitor, unsure of DME at home    Safety:  Type of devices:  All fall risk precautions in place, Call light within reach,

## 2018-06-15 NOTE — PROGRESS NOTES
Patient Name: Jesse Contreras        MRN: [id number]    : 1934  (80 y.o.)  Gender: male   Principal Problem: <principal problem not specified>    Section D - Mood     Should Resident Mood Interview be Conducted?  Attempt to conduct interview with all residents   0. No (resident is rarely/never understood) à Skip to and complete -, Staff Assessment of Mood (PHQ 9-OV)  1. Yes à Continue to , Resident Mood Interview (PHQ-9) Enter Code    1   . Resident Mood Interview (PHQ-9)   Say to resident: Reji Ek the last 2 weeks, have you been bothered by any of the following problems?    If symptom is present, enter 1(yes) in column 1, Symptom Presence. Then move to column 2, Symptom Frequency, and indicate symptom frequency. 1. Symptom Presence                   2. Symptom                                                                  Frequency  0. No (enter 0 in column 2)          0. Never or 1 day          1. Yes (enter 0-3 in column 2)      1. 2-6 days           9. No Response                               2.  7-11 days                                                 3.  12-14 days   1. Symptom Presence 2. Symptom  Frequency        Enter Scores in boxes below   A. Little interest or pleasure in doing things 0 0   B. Feeling down, depressed, or hopeless 0 0   C. Trouble falling or staying asleep, or sleeping too much 1 2   D. Feeling tired or having little energy 1 2   E. Poor appetite or overeating 1 2   F. Feeling bad about yourself  or that you are a failure, or have let your family down 0 0   G. Trouble concentrating on things, such as reading the newspaper or watching television 0 0   H. Moving or speaking so slowly that other people have noticed. Or the opposite-being so fidgety or restless that s/he has been moving around a lot more than usual   0   0   I. Thoughts that you would be better off dead, or of hurting yourself in some way.  0 0              Patient Name: Tori Pruitt

## 2018-06-16 PROCEDURE — 6370000000 HC RX 637 (ALT 250 FOR IP): Performed by: FAMILY MEDICINE

## 2018-06-16 PROCEDURE — A5063 DRAIN OSTOMY POUCH W/FLANGE: HCPCS

## 2018-06-16 PROCEDURE — 6370000000 HC RX 637 (ALT 250 FOR IP): Performed by: INTERNAL MEDICINE

## 2018-06-16 PROCEDURE — 6360000002 HC RX W HCPCS: Performed by: INTERNAL MEDICINE

## 2018-06-16 PROCEDURE — 1290000000 HC SEMI PRIVATE OTHER R&B

## 2018-06-16 RX ORDER — MULTIVITAMIN WITH FOLIC ACID 400 MCG
1 TABLET ORAL DAILY
Status: DISCONTINUED | OUTPATIENT
Start: 2018-06-16 | End: 2018-07-03 | Stop reason: HOSPADM

## 2018-06-16 RX ADMIN — OXYCODONE HYDROCHLORIDE AND ACETAMINOPHEN 500 MG: 500 TABLET ORAL at 08:59

## 2018-06-16 RX ADMIN — DIPHENHYDRAMINE HCL 25 MG: 25 TABLET ORAL at 20:59

## 2018-06-16 RX ADMIN — ENOXAPARIN SODIUM 40 MG: 40 INJECTION, SOLUTION INTRAVENOUS; SUBCUTANEOUS at 20:59

## 2018-06-16 RX ADMIN — THERA TABS 1 TABLET: TAB at 18:03

## 2018-06-16 RX ADMIN — METOPROLOL TARTRATE 25 MG: 25 TABLET ORAL at 08:59

## 2018-06-16 RX ADMIN — ASPIRIN 81 MG: 81 TABLET ORAL at 08:59

## 2018-06-16 RX ADMIN — ACETAMINOPHEN 1000 MG: 500 TABLET ORAL at 02:54

## 2018-06-16 RX ADMIN — METOPROLOL TARTRATE 25 MG: 25 TABLET ORAL at 20:58

## 2018-06-16 RX ADMIN — FERROUS SULFATE TAB 325 MG (65 MG ELEMENTAL FE) 325 MG: 325 (65 FE) TAB at 20:59

## 2018-06-16 RX ADMIN — PRAVASTATIN SODIUM 80 MG: 80 TABLET ORAL at 20:59

## 2018-06-16 RX ADMIN — ACETAMINOPHEN 1000 MG: 500 TABLET ORAL at 17:58

## 2018-06-16 RX ADMIN — DOXAZOSIN 4 MG: 4 TABLET ORAL at 20:58

## 2018-06-16 RX ADMIN — ACETAMINOPHEN 1000 MG: 500 TABLET ORAL at 09:03

## 2018-06-16 RX ADMIN — FERROUS SULFATE TAB 325 MG (65 MG ELEMENTAL FE) 325 MG: 325 (65 FE) TAB at 08:59

## 2018-06-16 RX ADMIN — FINASTERIDE 5 MG: 5 TABLET, FILM COATED ORAL at 08:59

## 2018-06-16 ASSESSMENT — PAIN DESCRIPTION - PROGRESSION
CLINICAL_PROGRESSION: NOT CHANGED

## 2018-06-16 ASSESSMENT — PAIN DESCRIPTION - ORIENTATION: ORIENTATION: RIGHT

## 2018-06-16 ASSESSMENT — PAIN SCALES - GENERAL
PAINLEVEL_OUTOF10: 5
PAINLEVEL_OUTOF10: 10
PAINLEVEL_OUTOF10: 6
PAINLEVEL_OUTOF10: 5

## 2018-06-16 ASSESSMENT — PAIN DESCRIPTION - PAIN TYPE: TYPE: ACUTE PAIN

## 2018-06-16 ASSESSMENT — PAIN DESCRIPTION - FREQUENCY: FREQUENCY: CONTINUOUS

## 2018-06-16 ASSESSMENT — PAIN DESCRIPTION - LOCATION: LOCATION: SHOULDER

## 2018-06-16 ASSESSMENT — PAIN DESCRIPTION - DESCRIPTORS: DESCRIPTORS: ACHING

## 2018-06-16 ASSESSMENT — PAIN DESCRIPTION - ONSET: ONSET: ON-GOING

## 2018-06-17 LAB
ANION GAP SERPL CALCULATED.3IONS-SCNC: 14 MEQ/L (ref 8–16)
BASOPHILS # BLD: 0.6 %
BASOPHILS ABSOLUTE: 0 THOU/MM3 (ref 0–0.1)
BUN BLDV-MCNC: 47 MG/DL (ref 7–22)
CALCIUM SERPL-MCNC: 9.2 MG/DL (ref 8.5–10.5)
CHLORIDE BLD-SCNC: 103 MEQ/L (ref 98–111)
CO2: 27 MEQ/L (ref 23–33)
CREAT SERPL-MCNC: 1.3 MG/DL (ref 0.4–1.2)
EOSINOPHIL # BLD: 0 %
EOSINOPHILS ABSOLUTE: 0 THOU/MM3 (ref 0–0.4)
GFR SERPL CREATININE-BSD FRML MDRD: 53 ML/MIN/1.73M2
GLUCOSE BLD-MCNC: 97 MG/DL (ref 70–108)
HCT VFR BLD CALC: 31 % (ref 42–52)
HEMOGLOBIN: 10.5 GM/DL (ref 14–18)
LYMPHOCYTES # BLD: 30.7 %
LYMPHOCYTES ABSOLUTE: 2.1 THOU/MM3 (ref 1–4.8)
MCH RBC QN AUTO: 29.5 PG (ref 27–31)
MCHC RBC AUTO-ENTMCNC: 33.7 GM/DL (ref 33–37)
MCV RBC AUTO: 87.6 FL (ref 80–94)
MONOCYTES # BLD: 7.4 %
MONOCYTES ABSOLUTE: 0.5 THOU/MM3 (ref 0.4–1.3)
NUCLEATED RED BLOOD CELLS: 0 /100 WBC
PDW BLD-RTO: 13.3 % (ref 11.5–14.5)
PLATELET # BLD: 283 THOU/MM3 (ref 130–400)
PMV BLD AUTO: 7.2 FL (ref 7.4–10.4)
POTASSIUM SERPL-SCNC: 4.5 MEQ/L (ref 3.5–5.2)
RBC # BLD: 3.54 MILL/MM3 (ref 4.7–6.1)
SEG NEUTROPHILS: 61.3 %
SEGMENTED NEUTROPHILS ABSOLUTE COUNT: 4.2 THOU/MM3 (ref 1.8–7.7)
SODIUM BLD-SCNC: 144 MEQ/L (ref 135–145)
WBC # BLD: 6.8 THOU/MM3 (ref 4.8–10.8)

## 2018-06-17 PROCEDURE — 97110 THERAPEUTIC EXERCISES: CPT

## 2018-06-17 PROCEDURE — 85025 COMPLETE CBC W/AUTO DIFF WBC: CPT

## 2018-06-17 PROCEDURE — 36415 COLL VENOUS BLD VENIPUNCTURE: CPT

## 2018-06-17 PROCEDURE — 1290000000 HC SEMI PRIVATE OTHER R&B

## 2018-06-17 PROCEDURE — 6370000000 HC RX 637 (ALT 250 FOR IP): Performed by: INTERNAL MEDICINE

## 2018-06-17 PROCEDURE — 6360000002 HC RX W HCPCS: Performed by: INTERNAL MEDICINE

## 2018-06-17 PROCEDURE — 6370000000 HC RX 637 (ALT 250 FOR IP): Performed by: FAMILY MEDICINE

## 2018-06-17 PROCEDURE — 80048 BASIC METABOLIC PNL TOTAL CA: CPT

## 2018-06-17 PROCEDURE — 97530 THERAPEUTIC ACTIVITIES: CPT

## 2018-06-17 RX ADMIN — FINASTERIDE 5 MG: 5 TABLET, FILM COATED ORAL at 14:45

## 2018-06-17 RX ADMIN — ASPIRIN 81 MG: 81 TABLET ORAL at 08:22

## 2018-06-17 RX ADMIN — DOXAZOSIN 4 MG: 4 TABLET ORAL at 20:50

## 2018-06-17 RX ADMIN — THERA TABS 1 TABLET: TAB at 08:22

## 2018-06-17 RX ADMIN — ENOXAPARIN SODIUM 40 MG: 40 INJECTION, SOLUTION INTRAVENOUS; SUBCUTANEOUS at 20:50

## 2018-06-17 RX ADMIN — PRAVASTATIN SODIUM 80 MG: 80 TABLET ORAL at 20:50

## 2018-06-17 RX ADMIN — ACETAMINOPHEN 1000 MG: 500 TABLET ORAL at 14:45

## 2018-06-17 RX ADMIN — METOPROLOL TARTRATE 25 MG: 25 TABLET ORAL at 20:50

## 2018-06-17 RX ADMIN — ACETAMINOPHEN 1000 MG: 500 TABLET ORAL at 08:22

## 2018-06-17 RX ADMIN — DOCUSATE SODIUM 100 MG: 100 CAPSULE, LIQUID FILLED ORAL at 20:50

## 2018-06-17 RX ADMIN — FLUTICASONE PROPIONATE 1 SPRAY: 50 SPRAY, METERED NASAL at 08:21

## 2018-06-17 RX ADMIN — ACETAMINOPHEN 1000 MG: 500 TABLET ORAL at 20:50

## 2018-06-17 RX ADMIN — GUAIFENESIN 600 MG: 600 TABLET, EXTENDED RELEASE ORAL at 20:50

## 2018-06-17 RX ADMIN — FERROUS SULFATE TAB 325 MG (65 MG ELEMENTAL FE) 325 MG: 325 (65 FE) TAB at 20:50

## 2018-06-17 RX ADMIN — OMEGA-3-ACID ETHYL ESTERS 1 CAPSULE: 1 CAPSULE, LIQUID FILLED ORAL at 08:22

## 2018-06-17 RX ADMIN — ACETAMINOPHEN 1000 MG: 500 TABLET ORAL at 02:15

## 2018-06-17 ASSESSMENT — PAIN DESCRIPTION - ONSET
ONSET: ON-GOING
ONSET: ON-GOING

## 2018-06-17 ASSESSMENT — PAIN SCALES - GENERAL
PAINLEVEL_OUTOF10: 5
PAINLEVEL_OUTOF10: 8
PAINLEVEL_OUTOF10: 8
PAINLEVEL_OUTOF10: 4
PAINLEVEL_OUTOF10: 4

## 2018-06-17 ASSESSMENT — PAIN DESCRIPTION - FREQUENCY
FREQUENCY: CONTINUOUS
FREQUENCY: CONTINUOUS

## 2018-06-17 ASSESSMENT — PAIN DESCRIPTION - LOCATION
LOCATION: SHOULDER
LOCATION: KNEE;SHOULDER
LOCATION: SHOULDER

## 2018-06-17 ASSESSMENT — PAIN DESCRIPTION - PROGRESSION
CLINICAL_PROGRESSION: NOT CHANGED

## 2018-06-17 ASSESSMENT — PAIN SCALES - WONG BAKER: WONGBAKER_NUMERICALRESPONSE: 6

## 2018-06-17 ASSESSMENT — PAIN DESCRIPTION - ORIENTATION
ORIENTATION: RIGHT

## 2018-06-17 ASSESSMENT — PAIN DESCRIPTION - PAIN TYPE
TYPE: ACUTE PAIN;CHRONIC PAIN
TYPE: ACUTE PAIN
TYPE: ACUTE PAIN

## 2018-06-17 ASSESSMENT — PAIN DESCRIPTION - DESCRIPTORS
DESCRIPTORS: ACHING
DESCRIPTORS: ACHING

## 2018-06-17 NOTE — PROGRESS NOTES
Exploratory Laparotomy - Dr. Ken Wilson  2009    [address] [place] [address] CATH LAB PROCEDURE         Restrictions/Precautions:  Weight Bearing, General Precautions, Fall Risk, Contact Precautions                 Right Upper Extremity Weight Bearing: Non Weight Bearing  Other position/activity restrictions: Pt has a h/o of an anoxic brain injury  Right Upper Extremity Brace/Splint: Sling    Prior Level of Function:  ADL Assistance: Independent  Homemaking Assistance: Independent  Ambulation Assistance: Independent  Transfer Assistance: Independent  Additional Comments: all information gathered from chart review; spouse or caregiver not present and pt not able to appropriately answer questions d/t history of dementia    Subjective:     Subjective: Patient in w/c upon arrival. Patient agreeable to therapy with Min encouragement to participate. Pain:  Yes. Pain Assessment  Pain Assessment: Faces (R UE, no rating given)       Social/Functional:  Lives With: Spouse  Type of Home: House  Home Layout: Two level, 1/2 bath on main level, Bed/Bath upstairs  Home Access: Stairs to enter with rails  Entrance Stairs - Number of Steps: 1 railing with 2 steps to enter     Objective:            Balance  Comments: Performed ring toss sitting in w/c, x3 trials, patient reaching outside EDDI with L hand, patient declined standing activities. Exercises:  Exercises  Comments: Performed seated BLE exercises: heel raises, long arc quads x10 reps to increase strength. Patient struggled with seated marches this session due to weakness and B knee pain. Education of importance of PT. Activity Tolerance:  Activity Tolerance: Patient limited by cognitive status; Patient limited by pain    Assessment:   Body structures, Functions, Activity limitations: Decreased functional mobility , Decreased strength, Decreased endurance,

## 2018-06-17 NOTE — PROGRESS NOTES
Education:  Patient Education: safety wtih mobility, importance of therapy  Barriers to Learning: Cognition     Equipment Recommendations:  Equipment Needed: No  Other: Continue to monitor. Safety:  Safety Devices in place: Yes  Type of devices: All fall risk precautions in place, Call light within reach, Patient at risk for falls, Nurse notified, Gait belt, Chair alarm in place, Left in chair    Plan:  Times per week: 6x   Current Treatment Recommendations: Endurance Training, Patient/Caregiver Education & Training, Equipment Evaluation, Education, & procurement, Self-Care / ADL, Balance Training, Functional Mobility Training, Safety Education & Training, ROM    Goals:  Patient goals : Decreased R shoulder pain    Short term goals  Time Frame for Short term goals: 1 week  Short term goal 1: Pt will complete ADL routine with no  >mod cues for safe and adapted tech within shoulder precations and Mod A to increase independence in self care routine  Short term goal 2: Pt will complte basic transfers with no >CGA and min cues for claire walker tech to increase independence with toileting. Short term goal 3: Pt will tolerate standing x 5 min with SBA to increase endurance for sinkside AD routine. Short term goal 4: Pt/ family will assist with donning sling with no > min A and mod cues to increase independene in self care tasks. Long term goals  Time Frame for Long term goals : 2 weeks   Long term goal 1: Pt will complete RUE distal AROM and LUE light resistive exercises with mod vc to increase ease of transfers and self cares. Long term goal 2: Pt will complete functional mobility to/from therapy gym with CGA and mod vc for safety to increase indep with self cares.

## 2018-06-17 NOTE — PROGRESS NOTES
[P.O.:240]  Out: -   I/O this shift:  In: 240 [P.O.:240]  Out: -     /64   Pulse 93   Temp 98.4 °F (36.9 °C) (Oral)   Resp 18   Ht 5' 2\" (1.575 m)   Wt 150 lb 14.4 oz (68.4 kg)   SpO2 96%   BMI 27.60 kg/m²     /64   Pulse 93   Temp 98.4 °F (36.9 °C) (Oral)   Resp 18   Ht 5' 2\" (1.575 m)   Wt 150 lb 14.4 oz (68.4 kg)   SpO2 96%   BMI 27.60 kg/m²   General appearance: alert, appears stated age and cooperative  Head: Normocephalic, without obvious abnormality, atraumatic  Lungs: clear to auscultation bilaterally  Heart: regular rate and rhythm, S1, S2 normal, no murmur, click, rub or gallop  Abdomen: soft, non-tender; bowel sounds normal; no masses,  no organomegaly  Extremities: extremities normal, atraumatic, no cyanosis or edema, pain with movement of the right upper arm  Skin: Skin color, texture, turgor normal. No rashes or lesions  Neurologic: Grossly normal      Electronically signed by Shannan Luna MD on 6/16/2018 at 8:04 PM

## 2018-06-18 PROCEDURE — 97116 GAIT TRAINING THERAPY: CPT

## 2018-06-18 PROCEDURE — 97530 THERAPEUTIC ACTIVITIES: CPT

## 2018-06-18 PROCEDURE — 97535 SELF CARE MNGMENT TRAINING: CPT

## 2018-06-18 PROCEDURE — 97110 THERAPEUTIC EXERCISES: CPT

## 2018-06-18 PROCEDURE — 1290000000 HC SEMI PRIVATE OTHER R&B

## 2018-06-18 PROCEDURE — 6370000000 HC RX 637 (ALT 250 FOR IP): Performed by: FAMILY MEDICINE

## 2018-06-18 PROCEDURE — 6370000000 HC RX 637 (ALT 250 FOR IP): Performed by: INTERNAL MEDICINE

## 2018-06-18 RX ADMIN — ACETAMINOPHEN 650 MG: 325 TABLET ORAL at 18:23

## 2018-06-18 RX ADMIN — FLUTICASONE PROPIONATE 1 SPRAY: 50 SPRAY, METERED NASAL at 10:06

## 2018-06-18 RX ADMIN — DIPHENHYDRAMINE HCL 25 MG: 25 TABLET ORAL at 01:01

## 2018-06-18 ASSESSMENT — PAIN SCALES - GENERAL
PAINLEVEL_OUTOF10: 10
PAINLEVEL_OUTOF10: 10
PAINLEVEL_OUTOF10: 4

## 2018-06-18 ASSESSMENT — PAIN DESCRIPTION - LOCATION: LOCATION: SHOULDER

## 2018-06-18 ASSESSMENT — PAIN DESCRIPTION - PAIN TYPE: TYPE: ACUTE PAIN

## 2018-06-18 ASSESSMENT — PAIN DESCRIPTION - ORIENTATION: ORIENTATION: RIGHT

## 2018-06-18 NOTE — PROGRESS NOTES
[facility]  Transitional Care Unit  Team Conference Note    Patient Name: Jitendra Bowser   MRN: [id number]    : 1934  (80 y.o.)  Gender: male   Referring Practitioner: Ordering: Jennifer Woods MD Attending: GM Gibbons MD  Diagnosis: R humerus fracture s/p fall    Team Conference Date: 2018   Admission Date:   8231  4:13 PM  Re-Certification Date:     :  Tentative Discharge Plan and current psychosocial issues: The patient continues to be inconsistent in his willingness to participate in therapy sessions. An insurance update will be needed on , and his spouse has alluded to using the \"Medicare right to appeal \" if a denial is forthcoming. Have not been able to meet with spouse in person to discuss discharge planning. Nursing:     Weight:  Weight: has been stable     Wounds/Incisions/Ulcers:  No skin/wound issues identified  Bowel: colostomy  Bladder:continent at times     Pain: Patient's pain is currently controlled with tylenol    Education Provided:  Diabetic:  No  Peg Tube:No    Yan Care:  Education:No  Removal Necessary:  NA  Supplies Needed: NA     Anticoagulant Use:  Patient on lovenox for anticoagulation, which is being managed by Primary Care Physician    Antibiotic Use:  Patient not on antibiotics    Psychotropic Medication Use:  Patient not on psychotropic medications. Oxygen Use: No    Home Medications Reconciled: N/A    Physical Therapy:   Bed Mobility  Scooting:  Moderate assistance  Transfers  Sit to Stand: Contact guard assistance (from bedside chair & wheelchair; good hand placement; increased time to complete)  Stand to sit: Contact guard assistance (to bedside chair & wheelchair)  Stand Pivot Transfers: Contact guard assistance (using hemiwalker. )  Ambulation 1  Surface: level tile  Device: Hemiwalker  Other Apparatus: Wheelchair follow  Assistance: Contact guard assistance  Quality of Gait: very slow velocity, shortened B step length, narrow EDDI, forward flexed posture, downward gaze, mild unsteadiness with no LOB  Distance: 70'x1, 100'x1  Comments: pt c/o R shoulder and B knee pain; manual assist for Hemiwalker placement x3  PT Equipment Recommendations  Other: continue to monitor, unsure of DME at home    Occupational  Therapy:   ADL  Grooming: Minimal assistance (CGA standing sinkside for oral care with OTR assisting to apply toothpaste to toothbrush)  UE Bathing: Minimal assistance (To wash BUE while sitting. )  LE Bathing: Other (Comment) (Pt refused to complete and became agitated. )  UE Dressing: Dependent/Total (sling )  LE Dressing: Dependent/Total (with constant cues needed for necessity of task as pt easily agitated)  Toileting: Maximum assistance       Speech Therapy:       Nutrition:    Weight: 149 lb 11.2 oz (67.9 kg) / Body mass index is 27.38 kg/m². Please see nutrition note for details. Family Education: No family available for education  Fall Risk:  Falling star program initiated    Goal Achievement:   Patient Continues to progress toward goal achievement slowly    Discharge Plan   Estimated Length of Stay: pending insurance eval  Destination: undetermined at this time  Services at Discharge:  Other unknown  Equipment at Discharge: Needs: unknown  Factors facilitating achievement of predicted outcomes: Family support and Pleasant  Barriers to the achievement of predicted outcomes: Cognitive deficit, Impulsivity, Limited safety awareness, Limited insight into deficits, Decreased endurance, Decreased proprioception, Upper extremity weakness, Lower extremity weakness and Long standing deficits    Readmission Risk              Risk of Unplanned Readmission:        24           High (20-31)         Team Members Present at Conference:  Physician: Dr. Livan Williamson MD  Nurse: Elena Coats RN, Asha PEREZ  :  SRINIVASA Brady  Occupational Therapist:  ANA  Physical Therapist:   Sarah Hernandes, LETTY  Speech Therapist: Speech Therapist: N/A. All team members have reviewed and updated as necessary and appropriate the established interdisciplinary plan of care within the medical record of Evelina Wyatt. Pt's team conference attended (see above). Pt seen on rounds with LSW, to review the results with patient and family. Discussed length of stay as above. Pt's team conference attended (see above.)  Pt seen on rounds with LSW, to review the results with patient and family. Discussed length of stay as above.     Physical Exam:  General:  Alert and oriented  Vitals:   Vitals:    06/19/18 0811   BP: 130/60   Pulse: 73   Resp: 16   Temp: 97.8 °F (36.6 °C)   SpO2: 97%     Heart:  Regular rate and rhythm  Lungs:  Clear to auscultation  Abdomen:  soft with positive bowel sounds     Assessment:  Progressing in full rehabilitation program (see above)    Plan:  Continue Rehab            Continue current medications            Spent 37 minutes today in patient management and care    Electronically signed by Gamaliel Read MD on 6/19/2018 at 9:05 AM

## 2018-06-18 NOTE — PROGRESS NOTES
Orthopedic Surgery      Orthopaedic Attending Note    Patient seen and evaluated     Discussed the current issue non-op right proximal humerus fx  Pt being treated in sling, no new complaints    No further intervention needed at this time    Will see khurram, f/u dr. Avis Severance in 2-3     Electronically signed by OTTO Watson CNP on 6/18/2018 at 2:23 PM

## 2018-06-18 NOTE — PROGRESS NOTES
RENATE)  · Usual Body Wt: 136 lb (61.7 kg) (per pt report; 120 lb ob 3/26/17; 150 lb on 1/2/17)  · % Weight Change: Weight trends reviewed. Trending closer to weight from 6 months ago.,     · Ideal Body Wt: 118 lb (53.5 kg), % Ideal Body 119%  · BMI Classification: BMI 25.0 - 29.9 Overweight  · Comparative Standards (Estimated Nutrition Needs):  · Estimated Daily Total Kcal: 9723-4584 kcal/day  · Estimated Daily Protein (g): 42-56 g/day- monitoring renal status    Estimated Intake vs Estimated Needs: Intake Improving    Nutrition Risk Level: Moderate    Nutrition Interventions:   Continue current diet, Continue current ONS (Continue with current POC regarding nutrition)  Continued Inpatient Monitoring, Education not appropriate at this time    Nutrition Evaluation:   · Evaluation: Progressing toward goals   · Goals: Pt will consume 75% or more of meals during LOS    · Monitoring: Meal Intake, Supplement Intake, Weight, Mental Status/Confusion    See Adult Nutrition Doc Flowsheet for more detail.      Electronically signed by Deepak Stevens RD LD [address]  on 6/18/18 at 3:31 PM    Contact Number: [phone]

## 2018-06-18 NOTE — PROGRESS NOTES
injury  Right Upper Extremity Brace/Splint: Sling    Prior Level of Function:  ADL Assistance: Independent  Homemaking Assistance: Independent  Ambulation Assistance: Independent  Transfer Assistance: Independent  Additional Comments: all information gathered from chart review; spouse or caregiver not present and pt not able to appropriately answer questions d/t history of dementia    Subjective   Subjective: In bed upon arrival, patient was sexually inappropriate during session, difficult to redirect at times    Overall Orientation Status: Impaired  Orientation Level: Oriented to place;Oriented to situation;Oriented to person  Pain:  Pain Assessment  Patient Currently in Pain: Yes  Pain Assessment: 0-10  Pain Level: 4  Pain Type: Acute pain  Pain Location: Shoulder  Pain Orientation: Right     Objective  Overall Cognitive Status: Exceptions  Cognition Comment: patient easily distracted, patient inappropriate during session, \"Please come closer, I play with you. \"  \"Let me hold your hand\", difficult to redirect and perseverative     ADL  Grooming: Setup (Brushed teeth and combed hair sitting up in bed, A for applying toothpaste to brush)     Functional Mobility  Functional Mobility Comments: Patient refused ambulation and sitting up in chair,  Encouragement given without success    Comment: Distal AROM in R UE - digit, wrist and elbow flexion/extension, pronation/supination- completed to improve ROM in distal UE and for edema management to improve independence with UB bathing & dressing tasks.       Activity Tolerance:  Activity Tolerance: Patient limited by fatigue    Assessment:     Performance deficits / Impairments: Decreased functional mobility , Decreased safe awareness, Decreased balance, Decreased ADL status, Decreased cognition, Decreased ROM, Decreased endurance, Decreased strength, Decreased high-level IADLs  Prognosis: Poor  Discharge Recommendations: Patient would benefit from continued therapy after discharge, 24 hour supervision or assist    Patient Education:  Patient Education: redirection and importance of therapy  Barriers to Learning: Cognition     Equipment Recommendations:  Equipment Needed: No  Other: Continue to monitor. Safety:  Safety Devices in place: Yes  Type of devices: All fall risk precautions in place, Call light within reach, Patient at risk for falls, Nurse notified, Gait belt, Bed alarm in place, Left in bed (Telesitter)    Plan:  Times per week: 6x   Current Treatment Recommendations: Endurance Training, Patient/Caregiver Education & Training, Equipment Evaluation, Education, & procurement, Self-Care / ADL, Balance Training, Functional Mobility Training, Safety Education & Training, ROM    Goals:  Patient goals : Decreased R shoulder pain    Short term goals  Time Frame for Short term goals: 1 week  Short term goal 1: Pt will complete ADL routine with no  >mod cues for safe and adapted tech within shoulder precations and Mod A to increase independence in self care routine  Short term goal 2: Pt will complte basic transfers with no >CGA and min cues for claire walker tech to increase independence with toileting. Short term goal 3: Pt will tolerate standing x 5 min with SBA to increase endurance for sinkside AD routine. Short term goal 4: Pt/ family will assist with donning sling with no > min A and mod cues to increase independene in self care tasks. Long term goals  Time Frame for Long term goals : 2 weeks   Long term goal 1: Pt will complete RUE distal AROM and LUE light resistive exercises with mod vc to increase ease of transfers and self cares. Long term goal 2: Pt will complete functional mobility to/from therapy gym with CGA and mod vc for safety to increase indep with self cares.    \

## 2018-06-18 NOTE — PROGRESS NOTES
[address] [address]  INPATIENT PHYSICAL THERAPY  DAILY NOTE  STRZ U 8E - 8E-67/067-A    Time In: 6536  Time Out: 1215  Timed Code Treatment Minutes: 42 Minutes  Minutes: 42          Date: 2018  Patient Name: Porsha Hair,  Gender:  male        MRN: [id number]  : 1934  (80 y.o.)  Referral Date : 18  Referring Practitioner: Ordering: Ihsan Nogueira MD Attending: GM Hernandez MD  Diagnosis: R humerus fracture s/p fall  Additional Pertinent Hx: Porsha Hair is a 80 y.o. male who presents to the Emergency Department via EMS for the evaluation of a fall. Patient reports a history of hypertension, hyperlipidemia, CVA, CAD, coronary angioplasty with stent placement, cardiac catheterization, and colectomy. He states that he slipped while walking down his stairs and landed on his right shoulder. He denies that he hit his head or lost consciousness, but reports that he is on Plavix. Patient rates his right shoulder pain at a 9/10 in severity, and states that the pain is sharp and aching in quality. He reports that the pain is continuous in duration. Patient denies experiencing any chest pain, neck pain, headache, visual changes, weakness, numbness, tingling, fever, chills, dizziness, lightheadedness, or cough. He reports that he ate breakfast this morning. He denies any history of previous right shoulder injuries. Patient denies further complaints at initial encounter. Non surgical treatment by ortho. To TCU on .      Past Medical History:   Diagnosis Date    [facility])     Arthritis     knees    CAD (coronary artery disease)     CVA (cerebral infarction)     Hyperlipidemia     Hypertension     Iron deficiency anemia     Stroke (cerebrum) ([facility] [address]      Past Surgical History:   Procedure Laterality Date    [facility]  ,    [facility]    COLECTOMY  2017    Sigmoid Colon Resection, Colostomy, Mobilization Splenic Flexure, Drainage of LLQ Abcess 4: Pt to negotiate two 6\" steps with 1 handrail at [facility] for entering and exiting the home. Long term goal 5: Pt to perform car transfer at [facility] for safe transfer home.

## 2018-06-18 NOTE — PLAN OF CARE
bearing restrictions in order to get up and down from various surfaces    Goal: will ambulate 150' with left hemiwalker at stand by assist for walking household distances    Goal: will negotiate two 6\" steps with 1 handrail at contact guard assist for entering and exiting the home   [x] Problem: Safety (OT)    Goal: Will demonstrate safety requirements appropriate to situation/environment    Goal: will complete activity of daily living routine with no greater than mod cues for safe and adapted technique within shoulder precautions and Moderate assist to increase independence in self care routine    Goal: will complte basic transfers with no greater than contact guard assist and minimal cues for claire walker technique to increase independence with toileting    Goal: will tolerate standing x 5 min with stand by assist to increase endurance for sinkside activity of daily living routine    Goal: family will assist with donning sling with no greater than min assist and mod cues to increase independene in self care tasks

## 2018-06-19 PROCEDURE — 1290000000 HC SEMI PRIVATE OTHER R&B

## 2018-06-19 PROCEDURE — 6370000000 HC RX 637 (ALT 250 FOR IP): Performed by: INTERNAL MEDICINE

## 2018-06-19 PROCEDURE — 6370000000 HC RX 637 (ALT 250 FOR IP): Performed by: FAMILY MEDICINE

## 2018-06-19 PROCEDURE — 6360000002 HC RX W HCPCS: Performed by: INTERNAL MEDICINE

## 2018-06-19 PROCEDURE — 97530 THERAPEUTIC ACTIVITIES: CPT

## 2018-06-19 PROCEDURE — 97116 GAIT TRAINING THERAPY: CPT

## 2018-06-19 RX ADMIN — METOPROLOL TARTRATE 25 MG: 25 TABLET ORAL at 20:38

## 2018-06-19 RX ADMIN — OXYCODONE HYDROCHLORIDE AND ACETAMINOPHEN 500 MG: 500 TABLET ORAL at 10:42

## 2018-06-19 RX ADMIN — PRAVASTATIN SODIUM 80 MG: 80 TABLET ORAL at 00:38

## 2018-06-19 RX ADMIN — PRAVASTATIN SODIUM 80 MG: 80 TABLET ORAL at 20:38

## 2018-06-19 RX ADMIN — FERROUS SULFATE TAB 325 MG (65 MG ELEMENTAL FE) 325 MG: 325 (65 FE) TAB at 00:38

## 2018-06-19 RX ADMIN — FERROUS SULFATE TAB 325 MG (65 MG ELEMENTAL FE) 325 MG: 325 (65 FE) TAB at 20:38

## 2018-06-19 RX ADMIN — METOPROLOL TARTRATE 25 MG: 25 TABLET ORAL at 00:38

## 2018-06-19 RX ADMIN — FINASTERIDE 5 MG: 5 TABLET, FILM COATED ORAL at 10:36

## 2018-06-19 RX ADMIN — ASPIRIN 81 MG: 81 TABLET ORAL at 10:37

## 2018-06-19 RX ADMIN — DOXAZOSIN 4 MG: 4 TABLET ORAL at 00:38

## 2018-06-19 RX ADMIN — ACETAMINOPHEN 1000 MG: 500 TABLET ORAL at 04:22

## 2018-06-19 RX ADMIN — DIPHENHYDRAMINE HCL 25 MG: 25 TABLET ORAL at 20:38

## 2018-06-19 RX ADMIN — ACETAMINOPHEN 1000 MG: 500 TABLET ORAL at 20:38

## 2018-06-19 RX ADMIN — GUAIFENESIN 600 MG: 600 TABLET, EXTENDED RELEASE ORAL at 00:38

## 2018-06-19 RX ADMIN — GUAIFENESIN 600 MG: 600 TABLET, EXTENDED RELEASE ORAL at 20:38

## 2018-06-19 RX ADMIN — OMEGA-3-ACID ETHYL ESTERS 1 CAPSULE: 1 CAPSULE, LIQUID FILLED ORAL at 10:42

## 2018-06-19 RX ADMIN — ACETAMINOPHEN 1000 MG: 500 TABLET ORAL at 00:30

## 2018-06-19 RX ADMIN — METOPROLOL TARTRATE 25 MG: 25 TABLET ORAL at 10:37

## 2018-06-19 RX ADMIN — ENOXAPARIN SODIUM 40 MG: 40 INJECTION, SOLUTION INTRAVENOUS; SUBCUTANEOUS at 20:38

## 2018-06-19 RX ADMIN — DOXAZOSIN 4 MG: 4 TABLET ORAL at 20:38

## 2018-06-19 RX ADMIN — ACETAMINOPHEN 650 MG: 325 TABLET ORAL at 10:36

## 2018-06-19 RX ADMIN — ENOXAPARIN SODIUM 40 MG: 40 INJECTION, SOLUTION INTRAVENOUS; SUBCUTANEOUS at 00:38

## 2018-06-19 ASSESSMENT — PAIN DESCRIPTION - PAIN TYPE
TYPE: ACUTE PAIN

## 2018-06-19 ASSESSMENT — PAIN DESCRIPTION - LOCATION
LOCATION: SHOULDER
LOCATION: SHOULDER
LOCATION: ARM

## 2018-06-19 ASSESSMENT — PAIN DESCRIPTION - DESCRIPTORS: DESCRIPTORS: ACHING

## 2018-06-19 ASSESSMENT — PAIN DESCRIPTION - ORIENTATION
ORIENTATION: RIGHT

## 2018-06-19 ASSESSMENT — PAIN SCALES - GENERAL
PAINLEVEL_OUTOF10: 9
PAINLEVEL_OUTOF10: 6
PAINLEVEL_OUTOF10: 3
PAINLEVEL_OUTOF10: 3
PAINLEVEL_OUTOF10: 6
PAINLEVEL_OUTOF10: 0

## 2018-06-19 ASSESSMENT — PAIN DESCRIPTION - FREQUENCY: FREQUENCY: CONTINUOUS

## 2018-06-19 NOTE — PROGRESS NOTES
increase strength. Activity Tolerance:  Activity Tolerance: Patient limited by cognitive status; Patient limited by pain; Patient limited by endurance    Assessment: Body structures, Functions, Activity limitations: Decreased functional mobility , Decreased strength, Decreased endurance, Decreased balance, Decreased safe awareness, Decreased cognition  Assessment: Patient tolerated session fairly well. Patient motivated to ambulate this session. Patient would benefit from continued skilled physical therapy to improve functional mobility and decrease risk for falls. Prognosis: Fair  Discharge Recommendations: Continue to assess pending progress, Subacute/Skilled Nursing Facility, 24 hour supervision or assist, Patient would benefit from continued therapy after discharge    Patient Education:  Patient Education: ambulation, therex, transfers, bed mobility    Equipment Recommendations: Other: continue to monitor, unsure of DME at home    Safety:  Type of devices: All fall risk precautions in place, Call light within reach, Chair alarm in place, Gait belt, Patient at risk for falls, Left in chair  Restraints  Initially in place: No    Plan:  Times per week: 6x  Times per day: Daily  Specific instructions for Next Treatment: gait training, balance training, stair negotiation, LE strengthening  Current Treatment Recommendations: Strengthening, Functional Mobility Training, Transfer Training, Endurance Training, Gait Training, Stair training, Safety Education & Training, Patient/Caregiver Education & Training, Equipment Evaluation, Education, & procurement    Goals:  Patient goals : go home    Short term goals  Time Frame for Short term goals: 10 days  Short term goal 1: Pt to perform bed mobility at [facility] while maintaining UE WB restrictions for getting in and out of bed. - continue  Short term goal 2: Pt to transfer at [facility] while maintaining UE WB restrictions in order to get up and down from various surfaces. - continue  Short term goal 3: Pt to ambulate 100' with L hemiwalker at [facility] for walking household distances. - discontinue (refer to LTG)  Short term goal 4: Pt to negotiate two 6\" steps with 1 handrail at [facility] for entering and exiting the home. - not met    Long term goals  Time Frame for Long term goals : 3 weeks  Long term goal 1: Pt to perform bed mobility at [facility] while maintaining UE WB restrictions for getting in and out of bed. Long term goal 2: Pt to transfer at S while maintaining UE WB restrictions in order to get up and down from various surfaces. Long term goal 3: Pt to ambulate 150' with L hemiwalker at [facility] for walking household distances. Long term goal 4: Pt to negotiate two 6\" steps with 1 handrail at [facility] for entering and exiting the home. Long term goal 5: Pt to perform car transfer at [facility] for safe transfer home.

## 2018-06-19 NOTE — PROGRESS NOTES
Wife called and updated that the patient refused some medications.  She states she will be up later today

## 2018-06-19 NOTE — PLAN OF CARE
Problem: HH SN OSTOMY/ILEOSTOMY CARE  Goal: Will be independent with ostomy care  Will be independent with ostomy care    Outcome: Ongoing  In to change pt's colostomy pouch and assess reddened area on his posterior scalp. His scalp was free of any open areas or redness. Pt did ask us to comb his hair and then refused that because he was not sure that was his comb or if it was clean. He asked us not to touch his colostomy bag as he wants it changed tomorrow. He is currently wearing a [place] #[id number] pouch that is intact with mushy brown stool noted. We did reposition him in bed. We will check on him tomorrow to see if he is receptive to pouch change at that time. Nurse informed. Intervention: TEACH PATIENT COLOSTOMY CARE  Care plan reviewed with patient and nurse. Patient and nurse verbalize understanding of the plan of care and contribute to goal setting.

## 2018-06-19 NOTE — PROGRESS NOTES
[address]  INPATIENT OCCUPATIONAL THERAPY  STR TCU 8E  DAILY NOTE    Time:  Time In: 1416  Time Out: 1432  Timed Code Treatment Minutes: 16 Minutes  Minutes: 16     Date: 2018  Patient Name: Sylvie Carcamo,   Gender: male      Room: 8E-67/067-A  MRN: [id number]  : 1934  (80 y.o.)  Referring Practitioner: Ordering: Kacy Barber MD Attending: Dr. Morteza Flores  Diagnosis: humerus head fracture right closed   Additional Pertinent Hx: The patient is a 80 y.o. male  who presents with a history of a fall onto his right shoulder. Severe pain, sharp in character, worse with any motion. Seen in the ED and films revealed a non displaced right proximal humerus fracture. Ortho recommended sling and conservative treatment. Patient was having severe pain and unable to be discharged from the ED. He was seen on 7K and still with significant right shoulder pain, sharp, denied numbness or tingling in the right hand. He also denies any other injuries at this time. . Pt was admitted to [facility] on 18.      Past Medical History:   Diagnosis Date    Anoxic brain injury [facility])     Arthritis     knees    CAD (coronary artery disease)     CVA (cerebral infarction)     Hyperlipidemia     Hypertension     Iron deficiency anemia     Stroke (cerebrum) ([facility] [address]      Past Surgical History:   Procedure Laterality Date    BACK SURGERY  ,    [facility]    COLECTOMY  2017    Sigmoid Colon Resection, Colostomy, Mobilization Splenic Flexure, Drainage of LLQ Abcess Exploratory Laparotomy - Dr. Rossy Gresham      [address] [place] [address] CATH LAB PROCEDURE         Restrictions/Precautions:  Weight Bearing, General Precautions, Fall Risk, Contact Precautions      Right Upper Extremity Weight Bearing: Non Weight Bearing  Other position/activity restrictions: Pt has a h/o of an anoxic brain injury  Right Upper Extremity Brace/Splint: Sling    Prior Level of Function:  ADL Assistance: Independent  Homemaking Assistance: Independent  Ambulation Assistance: Independent  Transfer Assistance: Independent  Additional Comments: all information gathered from chart review; spouse or caregiver not present and pt not able to appropriately answer questions d/t history of dementia    Subjective   Subjective: Attempt x 1 patient refused OT. Attempt x 2 sitting up in chair,  encouragement for sitting up in chair    Overall Orientation Status: Impaired  Pain:  Pain Assessment  Patient Currently in Pain: Yes  Pain Assessment: 0-10  Pain Level: 6  Pain Type: Acute pain  Pain Location: Arm  Pain Orientation: Right     Objective  Overall Cognitive Status: Exceptions  Cognition Comment: Unable to have conversation, adamantly requested to go back to bed      ADL  Additional Comments: Applied toothpaste to toothbrush and refused brushing teeth     Bed mobility  Sit to Supine: Moderate assistance (A for B LE, HOB flat and no bedrail)    Transfers  Sit to stand: Contact guard assistance (bedside chair)  Stand to sit: Contact guard assistance (EOB)     Functional Mobility  Functional - Mobility Device: Hemiwalker  Assist Level: Contact guard assistance  Functional Mobility Comments: bedside chair to EOB       Patient refused UE Exercise, encouragement given with no success.   Patient reports that his entire R UE is broken,  Explained to patient that it is just the humerus, patient did not believe LAGOS  Activity Tolerance:  Activity Tolerance: Patient limited by fatigue    Assessment:     Performance deficits / Impairments: Decreased functional mobility , Decreased safe awareness, Decreased balance, Decreased ADL status, Decreased cognition, Decreased ROM, Decreased endurance, Decreased strength, Decreased high-level IADLs  Prognosis: Poor  Discharge Recommendations: Patient would benefit from continued therapy after discharge,

## 2018-06-20 PROCEDURE — 97110 THERAPEUTIC EXERCISES: CPT

## 2018-06-20 PROCEDURE — 97530 THERAPEUTIC ACTIVITIES: CPT

## 2018-06-20 PROCEDURE — 1290000000 HC SEMI PRIVATE OTHER R&B

## 2018-06-20 PROCEDURE — 6360000002 HC RX W HCPCS: Performed by: INTERNAL MEDICINE

## 2018-06-20 PROCEDURE — 6370000000 HC RX 637 (ALT 250 FOR IP): Performed by: INTERNAL MEDICINE

## 2018-06-20 PROCEDURE — 97116 GAIT TRAINING THERAPY: CPT

## 2018-06-20 PROCEDURE — 6370000000 HC RX 637 (ALT 250 FOR IP): Performed by: FAMILY MEDICINE

## 2018-06-20 PROCEDURE — 97535 SELF CARE MNGMENT TRAINING: CPT

## 2018-06-20 RX ADMIN — FERROUS SULFATE TAB 325 MG (65 MG ELEMENTAL FE) 325 MG: 325 (65 FE) TAB at 20:24

## 2018-06-20 RX ADMIN — METOPROLOL TARTRATE 25 MG: 25 TABLET ORAL at 20:24

## 2018-06-20 RX ADMIN — ACETAMINOPHEN 1000 MG: 500 TABLET ORAL at 20:24

## 2018-06-20 RX ADMIN — GUAIFENESIN 600 MG: 600 TABLET, EXTENDED RELEASE ORAL at 20:24

## 2018-06-20 RX ADMIN — ACETAMINOPHEN 1000 MG: 500 TABLET ORAL at 13:52

## 2018-06-20 RX ADMIN — DOXAZOSIN 4 MG: 4 TABLET ORAL at 20:24

## 2018-06-20 RX ADMIN — PRAVASTATIN SODIUM 80 MG: 80 TABLET ORAL at 20:24

## 2018-06-20 RX ADMIN — DOCUSATE SODIUM 100 MG: 100 CAPSULE, LIQUID FILLED ORAL at 20:24

## 2018-06-20 RX ADMIN — DIPHENHYDRAMINE HCL 25 MG: 25 TABLET ORAL at 20:24

## 2018-06-20 RX ADMIN — FINASTERIDE 5 MG: 5 TABLET, FILM COATED ORAL at 13:53

## 2018-06-20 RX ADMIN — ENOXAPARIN SODIUM 40 MG: 40 INJECTION, SOLUTION INTRAVENOUS; SUBCUTANEOUS at 20:24

## 2018-06-20 RX ADMIN — ASPIRIN 81 MG: 81 TABLET ORAL at 13:53

## 2018-06-20 ASSESSMENT — PAIN SCALES - GENERAL
PAINLEVEL_OUTOF10: 4
PAINLEVEL_OUTOF10: 6
PAINLEVEL_OUTOF10: 0
PAINLEVEL_OUTOF10: 6
PAINLEVEL_OUTOF10: 3

## 2018-06-20 ASSESSMENT — PAIN DESCRIPTION - PAIN TYPE: TYPE: ACUTE PAIN

## 2018-06-20 ASSESSMENT — PAIN DESCRIPTION - LOCATION: LOCATION: ARM

## 2018-06-20 ASSESSMENT — PAIN DESCRIPTION - ORIENTATION: ORIENTATION: RIGHT

## 2018-06-20 NOTE — PROGRESS NOTES
[address] [address]  INPATIENT PHYSICAL THERAPY  DAILY NOTE  STRZ U 8E - 8E-67/067-A    Time In: 0538  Time Out: 1215  Timed Code Treatment Minutes: 41 Minutes  Minutes: 41          Date: 2018  Patient Name: Tracey Henriquez,  Gender:  male        MRN: [id number]  : 1934  (80 y.o.)  Referral Date : 18  Referring Practitioner: Ordering: Nic Rae MD Attending: GM Piper MD  Diagnosis: R humerus fracture s/p fall  Additional Pertinent Hx: Tracey Henriquez is a 80 y.o. male who presents to the Emergency Department via EMS for the evaluation of a fall. Patient reports a history of hypertension, hyperlipidemia, CVA, CAD, coronary angioplasty with stent placement, cardiac catheterization, and colectomy. He states that he slipped while walking down his stairs and landed on his right shoulder. He denies that he hit his head or lost consciousness, but reports that he is on Plavix. Patient rates his right shoulder pain at a 9/10 in severity, and states that the pain is sharp and aching in quality. He reports that the pain is continuous in duration. Patient denies experiencing any chest pain, neck pain, headache, visual changes, weakness, numbness, tingling, fever, chills, dizziness, lightheadedness, or cough. He reports that he ate breakfast this morning. He denies any history of previous right shoulder injuries. Patient denies further complaints at initial encounter. Non surgical treatment by ortho. To TCU on .      Past Medical History:   Diagnosis Date    [facility] injury [facility])     Arthritis     knees    CAD (coronary artery disease)     CVA (cerebral infarction)     Hyperlipidemia     Hypertension     Iron deficiency anemia     Stroke (cerebrum) ([facility] [address]      Past Surgical History:   Procedure Laterality Date    BACK SURGERY  ,    [facility]    COLECTOMY  2017    Sigmoid Colon Resection, Colostomy, Mobilization Splenic Flexure, Drainage of LLQ Abcess Exploratory Laparotomy - Dr. Naomi Huddleston  2009    [facility] CATH LAB PROCEDURE         Restrictions/Precautions:  Weight Bearing, General Precautions, Fall Risk, Contact Precautions                 Right Upper Extremity Weight Bearing: Non Weight Bearing  Other position/activity restrictions: Pt has a h/o of an anoxic brain injury  Right Upper Extremity Brace/Splint: Sling    Prior Level of Function:  ADL Assistance: Independent  Homemaking Assistance: Independent  Ambulation Assistance: Independent  Transfer Assistance: Independent  Additional Comments: all information gathered from chart review; spouse or caregiver not present and pt not able to appropriately answer questions d/t history of dementia    Subjective:     Subjective: pt seated EOB with RN and friend Will Lint present upon arrival. Pt agreeable to ambulation with encouragement from friend and PTA. Pt requiring encouragement for all ativities during session occasionally becoming agitated and refusing to participate. pt confused. Pain:  Yes.   Pain Assessment  Pain Assessment: 0-10  Pain Level: 6 (R shoulder)       Social/Functional:  Lives With: Spouse  Type of Home: House  Home Layout: Two level, 1/2 bath on main level, Bed/Bath upstairs  Home Access: Stairs to enter with rails  Entrance Stairs - Number of Steps: 1 railing with 2 steps to enter     Objective:     Transfers  Sit to Stand: Contact guard assistance;Minimal Assistance (CGA first trial, min A second trial, returned to [facility] for remainder of session, cues for hand placement)  Stand to sit: Contact guard assistance (cues for hand placement and to control descent, poor eccentric control one trial to [facility])     Ambulation 1  Surface: level tile  Device: Hemiwalker  Other Apparatus: Wheelchair follow  Assistance: Contact guard assistance  Quality of Gait: slowed dori, short step length, unsteady, needing cues to sequence HW, occasionally lifting HW and carrying it, downward gaze, easily distracted, fatigued quickly  Distance: 40'x1 65'x1  Comments: cues for hemiwalker placement/sequencing     Attempted 6\" curb step with 2 assist and HW. Provided demo and max encouragement. Pt placed HW and R foot up on curb however refused to participate further bc \"I will fall\" pt then stepped down off of curb and returned to [facility]  Balance  Comments: seated EOB ~5mins for introduction/prep for gait varied from single to no UE support    Wheelchair Activities  Propulsion: Yes (attempted with hand over hand assist, pt removed his hand from grab bar and reports he will not participate as \"this is dangerous and he will cut up his hand\")       Other Activities  Comment: donned sling while pt seated EOB    Exercises:  Exercises  Comments: attempted seated BLE therex, pt completed ~5 reps LAQ B and refused further therex despite max encouragement for participation        Activity Tolerance:  Activity Tolerance: Patient limited by pain; Patient limited by cognitive status; Patient limited by fatigue  Activity Tolerance: self limting    Assessment: Body structures, Functions, Activity limitations: Decreased functional mobility , Decreased strength, Decreased endurance, Decreased balance, Decreased cognition, Decreased safe awareness  Assessment: pt tolerated session fait this date. pt demos decreased independence with mobility, functional tolerance, strength, balance and would benefit from cont skilled PT to improve these deficits to decrease the risk for falls and return to PLOF  Prognosis: Fair  Discharge Recommendations: Continue to assess pending progress, Subacute/Skilled Nursing Facility, 24 hour supervision or assist, Patient would benefit from continued therapy after discharge    Patient Education:  Patient Education: gait, transfers, step, importance of PT    Equipment Recommendations:   Other: continue to monitor, unsure of DME at home    Safety:  Type of devices: All fall risk precautions in place, Call light within reach, Chair alarm in place, Gait belt, Patient at risk for falls, Left in chair  Restraints  Initially in place: No    Plan:  Times per week: 6x  Times per day: Daily  Specific instructions for Next Treatment: gait training, balance training, stair negotiation, LE strengthening  Current Treatment Recommendations: Strengthening, Functional Mobility Training, Transfer Training, Endurance Training, Gait Training, Stair training, Safety Education & Training, Patient/Caregiver Education & Training, Equipment Evaluation, Education, & procurement    Goals:  Patient goals : go home    Short term goals  Time Frame for Short term goals: 10 days  Short term goal 1: Pt to perform bed mobility at [place] while maintaining UE WB restrictions for getting in and out of bed. - continue  Short term goal 2: Pt to transfer at [address] [address] while maintaining UE WB restrictions in order to get up and down from various surfaces. - continue  Short term goal 3: Pt to ambulate 100' with L hemiwalker at [facility] for walking household distances. - discontinue (refer to LTG)  Short term goal 4: Pt to negotiate two 6\" steps with 1 handrail at [facility] for entering and exiting the home. - not met    Long term goals  Time Frame for Long term goals : 3 weeks  Long term goal 1: Pt to perform bed mobility at [facility] while maintaining UE WB restrictions for getting in and out of bed. Long term goal 2: Pt to transfer at S while maintaining UE WB restrictions in order to get up and down from various surfaces. Long term goal 3: Pt to ambulate 150' with L hemiwalker at [address] [address] for walking household distances. Long term goal 4: Pt to negotiate two 6\" steps with 1 handrail at [facility] for entering and exiting the home. Long term goal 5: Pt to perform car transfer at [address] [address] for safe transfer home.

## 2018-06-20 NOTE — PROGRESS NOTES
[facility]  INPATIENT OCC[facility]ERAPY  [facility] TCU 8E  DAILY NOTE    Time:  Time In: 910  Time Out: 0950  Timed Code Treatment Minutes: 40 Minutes  Minutes: 40          Date: 2018  Patient Name: Tanner Lares,   Gender: male      Room: [facility]/067-A  MRN: [id number]  : 1934  (80 y.o.)  Referring Practitioner: Ordering: Meg Nina MD Attending: Dr. Esdras Rosas  Diagnosis: humerus head fracture right closed   Additional Pertinent Hx: The patient is a 80 y.o. male  who presents with a history of a fall onto his right shoulder. Severe pain, sharp in character, worse with any motion. Seen in the ED and films revealed a non displaced right proximal humerus fracture. Ortho recommended sling and conservative treatment. Patient was having severe pain and unable to be discharged from the ED. He was seen on 7K and still with significant right shoulder pain, sharp, denied numbness or tingling in the right hand. He also denies any other injuries at this time. . Pt was admitted to [address] [place] [address] on 18.      Past Medical History:   Diagnosis Date    [facility] injury [facility])     Arthritis     knees    CAD (coronary artery disease)     CVA (cerebral infarction)     Hyperlipidemia     Hypertension     Iron deficiency anemia     Stroke (cerebrum) ([facility] [address]      Past Surgical History:   Procedure Laterality Date    BACK SURGERY  ,    [facility]    COLECTOMY  2017    Sigmoid Colon Resection, Colostomy, Mobilization Splenic Flexure, Drainage of LLQ Abcess Exploratory Laparotomy - Dr. Naomi Huddleston      [address] [place] [address] CATH LAB PROCEDURE         Restrictions/Precautions:  Weight Bearing, General Precautions, Fall Risk, Contact Precautions                 Right Upper Extremity Weight Bearing: Non Weight Bearing  Other position/activity restrictions: Pt has a h/o of an anoxic brain injury  Right Upper Extremity Brace/Splint: Sling    Prior Level of Function:  ADL Assistance: Independent  Homemaking Assistance: Independent  Ambulation Assistance: Independent  Transfer Assistance: Independent  Additional Comments: all information gathered from chart review; spouse or caregiver not present and pt not able to appropriately answer questions d/t history of dementia    Subjective       Subjective: pt sitting in bedside chair. Pt stated he did not want to participate in therapy today at first attempt.  called wife and she talked to him.   he agreed to do therapy once he had breakfast.  Aliyah Connelliters back to him after breakfast and he did agree to do some tasks     Overall Orientation Status: Impaired  Orientation Level: Oriented to place;Oriented to person;Disoriented to time;Oriented to situation         Pain:  Pain Assessment  Patient Currently in Pain: Yes  Pain Level: 4  Pain Type: Acute pain  Pain Location: Arm  Pain Orientation: Right       Objective  Overall Cognitive Status: Exceptions  Following Commands: Inconsistently follows commands  Cognition Comment: pt declines to do some tasks in therapy and requires lots of cues to particpate and wife to assist at times to convince him to participate         ADL  Grooming: Setup;Minimal assistance (pt able to brush teeth and shaving sitting up in bedside chair. Pt require assist for completeness of shaving and toothpaste on toothbrush. )  Additional Comments: pt declined all other ADLS with therapy. did try to put sling on RUE but pt only allowed therapist to do some of it before getting mad and wanted the one strap off.            Bed mobility  Sit to Supine:  (for BLE in bed and straighten self in bed )    Transfers  Sit to stand: Contact guard assistance (bedside chair )  Stand to sit: Contact guard assistance (EOB)       Balance  Sitting Balance: Contact guard assistance  Standing Balance: Contact guard assistance     Time: x1 min   Activity:

## 2018-06-20 NOTE — PLAN OF CARE
Problem: DISCHARGE BARRIERS  Goal: Patient's continuum of care needs are met    Intervention: INVOLVE PATIENT/S.O. IN DISCHARGE PLANNING PROCESS  The TCU team meeting was held 6/19. The patient continues to show inconsistency in therapy participation, depending on his motivation on any given day. Team consensus given that he will require 24 hr care and assistance at time of discharge. An insurance update will be needed on 6/21, with request for approval of additional skilled days. Patient's spouse will likely want to file an appeal if insurance denies ongoing skilled stay. Following the meeting, the [facility] and  met with patient and provided brief update. This worker contacted spouse by phone and scheduled discharge planning meeting for 6/21 @ 3pm, at which time options for care will be discussed.  SRINIVASA Mclain

## 2018-06-21 ENCOUNTER — APPOINTMENT (OUTPATIENT)
Dept: GENERAL RADIOLOGY | Age: 83
DRG: 560 | End: 2018-06-21
Attending: FAMILY MEDICINE
Payer: MEDICARE

## 2018-06-21 PROCEDURE — 97530 THERAPEUTIC ACTIVITIES: CPT

## 2018-06-21 PROCEDURE — 97116 GAIT TRAINING THERAPY: CPT

## 2018-06-21 PROCEDURE — 6370000000 HC RX 637 (ALT 250 FOR IP): Performed by: FAMILY MEDICINE

## 2018-06-21 PROCEDURE — 6360000002 HC RX W HCPCS: Performed by: INTERNAL MEDICINE

## 2018-06-21 PROCEDURE — 73030 X-RAY EXAM OF SHOULDER: CPT

## 2018-06-21 PROCEDURE — 1290000000 HC SEMI PRIVATE OTHER R&B

## 2018-06-21 PROCEDURE — 6370000000 HC RX 637 (ALT 250 FOR IP): Performed by: INTERNAL MEDICINE

## 2018-06-21 RX ADMIN — GUAIFENESIN 600 MG: 600 TABLET, EXTENDED RELEASE ORAL at 20:23

## 2018-06-21 RX ADMIN — DOXAZOSIN 4 MG: 4 TABLET ORAL at 20:22

## 2018-06-21 RX ADMIN — ACETAMINOPHEN 1000 MG: 500 TABLET ORAL at 08:00

## 2018-06-21 RX ADMIN — METOPROLOL TARTRATE 25 MG: 25 TABLET ORAL at 08:00

## 2018-06-21 RX ADMIN — ACETAMINOPHEN 1000 MG: 500 TABLET ORAL at 20:23

## 2018-06-21 RX ADMIN — ASPIRIN 81 MG: 81 TABLET ORAL at 08:00

## 2018-06-21 RX ADMIN — DIPHENHYDRAMINE HCL 25 MG: 25 TABLET ORAL at 20:23

## 2018-06-21 RX ADMIN — PANTOPRAZOLE SODIUM 40 MG: 40 TABLET, DELAYED RELEASE ORAL at 20:23

## 2018-06-21 RX ADMIN — ENOXAPARIN SODIUM 40 MG: 40 INJECTION, SOLUTION INTRAVENOUS; SUBCUTANEOUS at 20:23

## 2018-06-21 RX ADMIN — PRAVASTATIN SODIUM 80 MG: 80 TABLET ORAL at 20:23

## 2018-06-21 RX ADMIN — FERROUS SULFATE TAB 325 MG (65 MG ELEMENTAL FE) 325 MG: 325 (65 FE) TAB at 20:23

## 2018-06-21 RX ADMIN — DOCUSATE SODIUM 100 MG: 100 CAPSULE, LIQUID FILLED ORAL at 20:23

## 2018-06-21 ASSESSMENT — PAIN DESCRIPTION - PAIN TYPE
TYPE: ACUTE PAIN
TYPE: ACUTE PAIN

## 2018-06-21 ASSESSMENT — PAIN SCALES - GENERAL
PAINLEVEL_OUTOF10: 7
PAINLEVEL_OUTOF10: 10
PAINLEVEL_OUTOF10: 10
PAINLEVEL_OUTOF10: 7

## 2018-06-21 ASSESSMENT — PAIN DESCRIPTION - LOCATION
LOCATION: ARM
LOCATION: ARM

## 2018-06-21 ASSESSMENT — PAIN DESCRIPTION - ORIENTATION
ORIENTATION: RIGHT
ORIENTATION: RIGHT

## 2018-06-21 NOTE — PROGRESS NOTES
Exploratory Laparotomy - Dr. Ezequiel Khan  2009    [address] [place] [address] CATH LAB PROCEDURE         Restrictions/Precautions:  Weight Bearing, General Precautions, Fall Risk, Contact Precautions                 Right Upper Extremity Weight Bearing: Non Weight Bearing  Other position/activity restrictions: Pt has a h/o of an anoxic brain injury  Right Upper Extremity Brace/Splint: Sling    Prior Level of Function:  ADL Assistance: Independent  Homemaking Assistance: Independent  Ambulation Assistance: Independent  Transfer Assistance: Independent  Additional Comments: all information gathered from chart review; spouse or caregiver not present and pt not able to appropriately answer questions d/t history of dementia    Subjective:     Subjective: Patient laying in bed upon arrival. Patient agreeable to ambulation with encouragement from therapist.     Pain:  Yes.   Pain Assessment  Pain Assessment: Faces (R shoulder)       Social/Functional:  Lives With: Spouse  Type of Home: House  Home Layout: Two level, 1/2 bath on main level, Bed/Bath upstairs  Home Access: Stairs to enter with rails  Entrance Stairs - Number of Steps: 1 railing with 2 steps to enter     Objective:  Supine to Sit: Minimal assistance (Min x1 with head of bed elevated, assist to elevate trunk and bring BLE to edge of bed)  Scooting: Contact guard assistance    Transfers  Sit to Stand: Contact guard assistance;Minimal Assistance (Min to CGA from bed, from w/c, cues for hand placement, slow to stand)  Stand to sit: Contact guard assistance (poor eccentric control)       Ambulation 1  Surface: level tile  Device: Hemiwalker  Other Apparatus: Wheelchair follow  Assistance: Contact guard assistance  Quality of Gait: decreased dori, improved hemiwalker sequencing second ambulation bout, downward gaze, fatigues easily, distracted easily, cues for hemiwalker placement, pt complaining of B knee pain  Distance: 8ft x1, 50ft x1         Balance  Comments: Patient stood for successful void in bathroom with CGA, also for hand washing and face washing, ~3min, no LOB      Exercises:  Exercises  Comments: Performed seated BLE exercises heel raises, long arc quads, hip add pillow squeezes x10 reps to increase strength for improved functional mobility. Encouragement for participation. Patient declined further exercises. Activity Tolerance:  Activity Tolerance: Patient limited by pain; Patient limited by fatigue;Patient limited by cognitive status    Assessment: Body structures, Functions, Activity limitations: Decreased functional mobility , Decreased strength, Decreased endurance, Decreased balance, Decreased cognition, Decreased safe awareness  Assessment: Patient tolerated session fairly well. Patient left in w/c for transport to take to x-ray at end of session. Patient would benefit from continued skilled physical therapy to decrease risk for falls and return to PLOF. Prognosis: Fair  Discharge Recommendations: Continue to assess pending progress, Subacute/Skilled Nursing Facility, 24 hour supervision or assist, Patient would benefit from continued therapy after discharge    Patient Education:  Patient Education: therex, gait, transfers, standing balance/tolerance    Equipment Recommendations: Other: continue to monitor, unsure of DME at home    Safety:  Type of devices:  All fall risk precautions in place, Call light within reach, Gait belt, Patient at risk for falls, Nurse notified (left in w/c for transport)  Restraints  Initially in place: No    Plan:  Times per week: 6x  Times per day: Daily  Specific instructions for Next Treatment: gait training, balance training, stair negotiation, LE strengthening  Current Treatment Recommendations: Strengthening, Functional Mobility Training, Transfer Training, Endurance Training, Gait Training, Stair training, Safety Education & Training, Patient/Caregiver Education & Training, Equipment Evaluation, Education, & procurement    Goals:  Patient goals : go home    Short term goals  Time Frame for Short term goals: 10 days  Short term goal 1: Pt to perform bed mobility at [place] while maintaining UE WB restrictions for getting in and out of bed. - continue  Short term goal 2: Pt to transfer at [facility] [address] while maintaining UE WB restrictions in order to get up and down from various surfaces. - continue  Short term goal 3: Pt to ambulate 100' with L hemiwalker at [address] for walking household distances. - discontinue (refer to LTG)  Short term goal 4: Pt to negotiate two 6\" steps with 1 handrail at [address] for entering and exiting the home. - not met    Long term goals  Time Frame for Long term goals : 3 weeks  Long term goal 1: Pt [facility] while maintaining UE WB restrictions for getting in and out of bed. Long term goal 2: Pt to transfer at [address] while maintaining UE WB restrictions in order to get up and down from various surfaces. Long term goal 3: Pt to ambulate 150' with L hemiwalker at [facility] [address] for walking household distances. Long term goal 4: Pt to negotiate two 6\" steps with 1 handrail at A for entering and exiting the home. Long term goal 5: Pt to perform car transfer at [facility] [address] for safe transfer home.

## 2018-06-21 NOTE — PROGRESS NOTES
injury  Right Upper Extremity Brace/Splint: Sling    Prior Level of Function:  ADL Assistance: Independent  Homemaking Assistance: Independent  Ambulation Assistance: Independent  Transfer Assistance: Independent  Additional Comments: all information gathered from chart review; spouse or caregiver not present and pt not able to appropriately answer questions d/t history of dementia    Subjective   Subjective: Up in chair upon arrival, yelling out that he wanted to go back to bed, patient perseverative    Overall Orientation Status: Impaired  Pain:  Pain Assessment  Patient Currently in Pain: Yes  Pain Assessment: 0-10  Pain Level: 7  Pain Type: Acute pain  Pain Location: Arm  Pain Orientation: Right     Objective  Overall Cognitive Status: Exceptions  Following Commands: Inconsistently follows commands  Memory: Decreased recall of precautions;Decreased recall of recent events;Decreased short term memory  Insights: Not aware of deficits  Cognition Comment: Patient becomes perseverative on wanting to go back to bed and unable to be redirected     ADL  Additional Comments: Patient refused grooming and sling,  encouragement given without success     Bed mobility  Sit to Supine: Moderate assistance (A for B LE, HOB flat and no bedrail)    Transfers  Sit to stand: Contact guard assistance (bedside chair and chair without arms)  Stand to sit: Contact guard assistance (chair without arms and EOB)     Functional Mobility  Functional - Mobility Device: Hemiwalker  Assist Level: Contact guard assistance  Functional Mobility Comments: in room approx 10 feet       Patient completed R UE distal AROM 1 set x 10 repetitions, elbow,wrist, digit flexion/extension and pronation/supination.   Exercises completed to increase function for ADLs    Activity Tolerance:  Activity Tolerance: Patient limited by fatigue  Activity Tolerance: patient perseverative on going back to bed, difficult to redirect    Assessment:     Performance deficits

## 2018-06-21 NOTE — PLAN OF CARE
Problem: Mental Status - Risk of, Impaired  Goal: Mental status within normal limits for patient  Patient in agreement to go down for xray of right shoulder.

## 2018-06-21 NOTE — PLAN OF CARE
whenever discharge occurs. Following this discussion, the  received a voicemail message from the [employer] reviewer, approving additional skilled days for patient (update now needed on 6/28). Update provided to spouse who was pleased with this development. Will maintain contact with spouse to continue to work on discharge plans. Next team meeting is planned for 6/26.  SRINIVASA [place]

## 2018-06-21 NOTE — PLAN OF CARE
Problem: Bowel/Gastric:  Goal: Will be independent with ostomy care        Outcome: Ongoing  Patient completely dependent on staff to care for colostomy. Does not attempt self care. Problem: Falls - Risk of:  Goal: Will remain free from falls      Outcome: Ongoing  Patient uses call light about 50% of the time to request assistance from staff. Telesitter in place for patient safety. Patient needs reminded continually to use quad cane when up.   Gait se

## 2018-06-21 NOTE — PLAN OF CARE
Problem: Mental Status - Risk of, Impaired  Goal: Mental status within normal limits for patient  Outcome: Ongoing  Patient accepted Tylenol, Lopresser, Aspirin, and Lovenox this morning. Refuses all other medications.

## 2018-06-22 PROCEDURE — 6360000002 HC RX W HCPCS: Performed by: INTERNAL MEDICINE

## 2018-06-22 PROCEDURE — 97116 GAIT TRAINING THERAPY: CPT

## 2018-06-22 PROCEDURE — 97530 THERAPEUTIC ACTIVITIES: CPT

## 2018-06-22 PROCEDURE — 6370000000 HC RX 637 (ALT 250 FOR IP): Performed by: FAMILY MEDICINE

## 2018-06-22 PROCEDURE — 1290000000 HC SEMI PRIVATE OTHER R&B

## 2018-06-22 PROCEDURE — 97535 SELF CARE MNGMENT TRAINING: CPT

## 2018-06-22 PROCEDURE — 6370000000 HC RX 637 (ALT 250 FOR IP): Performed by: INTERNAL MEDICINE

## 2018-06-22 RX ADMIN — ASPIRIN 81 MG: 81 TABLET ORAL at 08:37

## 2018-06-22 RX ADMIN — ACETAMINOPHEN 1000 MG: 500 TABLET ORAL at 18:23

## 2018-06-22 RX ADMIN — METOPROLOL TARTRATE 25 MG: 25 TABLET ORAL at 21:45

## 2018-06-22 RX ADMIN — PANTOPRAZOLE SODIUM 40 MG: 40 TABLET, DELAYED RELEASE ORAL at 18:26

## 2018-06-22 RX ADMIN — DOXAZOSIN 4 MG: 4 TABLET ORAL at 21:45

## 2018-06-22 RX ADMIN — ACETAMINOPHEN 1000 MG: 500 TABLET ORAL at 08:38

## 2018-06-22 RX ADMIN — ENOXAPARIN SODIUM 40 MG: 40 INJECTION, SOLUTION INTRAVENOUS; SUBCUTANEOUS at 21:51

## 2018-06-22 RX ADMIN — METOPROLOL TARTRATE 25 MG: 25 TABLET ORAL at 08:40

## 2018-06-22 RX ADMIN — PANTOPRAZOLE SODIUM 40 MG: 40 TABLET, DELAYED RELEASE ORAL at 08:38

## 2018-06-22 RX ADMIN — ACETAMINOPHEN 1000 MG: 500 TABLET ORAL at 21:45

## 2018-06-22 RX ADMIN — FINASTERIDE 5 MG: 5 TABLET, FILM COATED ORAL at 08:38

## 2018-06-22 ASSESSMENT — PAIN DESCRIPTION - DESCRIPTORS
DESCRIPTORS: STABBING
DESCRIPTORS: STABBING

## 2018-06-22 ASSESSMENT — PAIN SCALES - GENERAL
PAINLEVEL_OUTOF10: 6
PAINLEVEL_OUTOF10: 8
PAINLEVEL_OUTOF10: 6
PAINLEVEL_OUTOF10: 6

## 2018-06-22 ASSESSMENT — PAIN DESCRIPTION - ORIENTATION
ORIENTATION: RIGHT

## 2018-06-22 ASSESSMENT — PAIN DESCRIPTION - LOCATION
LOCATION: ARM

## 2018-06-22 ASSESSMENT — PAIN DESCRIPTION - FREQUENCY: FREQUENCY: CONTINUOUS

## 2018-06-22 ASSESSMENT — PAIN DESCRIPTION - PROGRESSION: CLINICAL_PROGRESSION: NOT CHANGED

## 2018-06-22 ASSESSMENT — PAIN DESCRIPTION - ONSET: ONSET: ON-GOING

## 2018-06-22 ASSESSMENT — PAIN DESCRIPTION - PAIN TYPE
TYPE: ACUTE PAIN
TYPE: ACUTE PAIN

## 2018-06-22 NOTE — PLAN OF CARE
Problem: Nutrition  Goal: Optimal nutrition therapy  Outcome: Ongoing  Nutrition Problem: Inadequate oral intake  Intervention: Food and/or Nutrient Delivery: Continue current diet, Continue current ONS (Continue with current POC regarding nutrition)  Nutritional Goals: Pt will consume 75% or more of meals during LOS

## 2018-06-22 NOTE — PROGRESS NOTES
[address] [address]  INPATIENT PHYSICAL THERAPY  DAILY NOTE  STRZ U 8E - 8E-67/067-A    Time In: 1592  Time Out: 1157  Timed Code Treatment Minutes: 23 Minutes  Minutes: 23          Date: 2018  Patient Name: Meg Rock,  Gender:  male        MRN: [id number]  : 1934  (80 y.o.)  Referral Date : 18  Referring Practitioner: Ordering: Conor Hester MD Attending: GM Gagnon MD  Diagnosis: R humerus fracture s/p fall  Additional Pertinent Hx: Meg Rock is a 80 y.o. male who presents to the Emergency Department via EMS for the evaluation of a fall. Patient reports a history of hypertension, hyperlipidemia, CVA, CAD, coronary angioplasty with stent placement, cardiac catheterization, and colectomy. He states that he slipped while walking down his stairs and landed on his right shoulder. He denies that he hit his head or lost consciousness, but reports that he is on Plavix. Patient rates his right shoulder pain at a 9/10 in severity, and states that the pain is sharp and aching in quality. He reports that the pain is continuous in duration. Patient denies experiencing any chest pain, neck pain, headache, visual changes, weakness, numbness, tingling, fever, chills, dizziness, lightheadedness, or cough. He reports that he ate breakfast this morning. He denies any history of previous right shoulder injuries. Patient denies further complaints at initial encounter. Non surgical treatment by ortho. To TCU on .      Past Medical History:   Diagnosis Date    [facility] brain [facility])     Arthritis     knees    CAD (coronary artery disease)     CVA (cerebral infarction)     Hyperlipidemia     Hypertension     Iron deficiency anemia     Stroke (cerebrum) ([facility] [address]      Past Surgical History:   Procedure Laterality Date    BACK SURGERY  ,    [facility]    COLECTOMY  2017    Sigmoid Colon Resection, Colostomy, Mobilization Splenic Flexure, Drainage of LLQ Abcess

## 2018-06-22 NOTE — PROGRESS NOTES
exercises with mod vc to increase ease of transfers and self cares. Long term goal 2: Pt will complete functional mobility to/from therapy gym with CGA and mod vc for safety to increase indep with self cares.

## 2018-06-23 PROCEDURE — 1290000000 HC SEMI PRIVATE OTHER R&B

## 2018-06-23 PROCEDURE — 6370000000 HC RX 637 (ALT 250 FOR IP): Performed by: INTERNAL MEDICINE

## 2018-06-23 PROCEDURE — 6370000000 HC RX 637 (ALT 250 FOR IP): Performed by: FAMILY MEDICINE

## 2018-06-23 PROCEDURE — 6360000002 HC RX W HCPCS: Performed by: INTERNAL MEDICINE

## 2018-06-23 RX ADMIN — GUAIFENESIN 600 MG: 600 TABLET, EXTENDED RELEASE ORAL at 21:57

## 2018-06-23 RX ADMIN — DOXAZOSIN 4 MG: 4 TABLET ORAL at 21:56

## 2018-06-23 RX ADMIN — METOPROLOL TARTRATE 25 MG: 25 TABLET ORAL at 08:45

## 2018-06-23 RX ADMIN — ACETAMINOPHEN 1000 MG: 500 TABLET ORAL at 08:46

## 2018-06-23 RX ADMIN — FERROUS SULFATE TAB 325 MG (65 MG ELEMENTAL FE) 325 MG: 325 (65 FE) TAB at 21:56

## 2018-06-23 RX ADMIN — PANTOPRAZOLE SODIUM 40 MG: 40 TABLET, DELAYED RELEASE ORAL at 17:31

## 2018-06-23 RX ADMIN — ENOXAPARIN SODIUM 40 MG: 40 INJECTION, SOLUTION INTRAVENOUS; SUBCUTANEOUS at 21:57

## 2018-06-23 RX ADMIN — ACETAMINOPHEN 1000 MG: 500 TABLET ORAL at 17:30

## 2018-06-23 RX ADMIN — PRAVASTATIN SODIUM 80 MG: 80 TABLET ORAL at 21:56

## 2018-06-23 RX ADMIN — METOPROLOL TARTRATE 25 MG: 25 TABLET ORAL at 21:56

## 2018-06-23 RX ADMIN — DIPHENHYDRAMINE HCL 25 MG: 25 TABLET ORAL at 22:01

## 2018-06-23 RX ADMIN — ACETAMINOPHEN 1000 MG: 500 TABLET ORAL at 21:57

## 2018-06-23 ASSESSMENT — PAIN SCALES - GENERAL
PAINLEVEL_OUTOF10: 7
PAINLEVEL_OUTOF10: 5
PAINLEVEL_OUTOF10: 0
PAINLEVEL_OUTOF10: 5
PAINLEVEL_OUTOF10: 6

## 2018-06-23 NOTE — PROGRESS NOTES
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 9)    Chronic kidney disease (CKD), stage III (moderate)  Resolved Problems:    * No resolved hospital problems. *      Plan:     Continue therapies  Check labs        Subjective:     Patient has no complaint of CP, SOB, or GI upset. There is pain on movement of the right arm still  Medication side effects: none  Scheduled Meds:   multivitamin  1 tablet Oral Daily    enoxaparin  40 mg Subcutaneous Q24H    aspirin  81 mg Oral Daily    docusate sodium  100 mg Oral BID    doxazosin  4 mg Oral Nightly    ferrous sulfate  325 mg Oral BID    finasteride  5 mg Oral Daily    fluticasone  1 spray Nasal Daily    guaiFENesin  600 mg Oral BID    lidocaine  1 patch Transdermal Q24H    metoprolol tartrate  25 mg Oral BID    omega-3 acid ethyl esters  1 capsule Oral Daily    pantoprazole  40 mg Oral BID AC    pravastatin  80 mg Oral Nightly    vitamin C  500 mg Oral Daily    acetaminophen  1,000 mg Oral Q6H     Continuous Infusions:  PRN Meds:acetaminophen, diclofenac sodium, morphine, diphenhydrAMINE, magnesium hydroxide, hyoscyamine, senna, polyethylene glycol    Review of Systems  Pertinent items are noted in HPI. Objective:     No data found. I/O last 3 completed shifts:   In: 640 [P.O.:640]  Out: -   No intake/output

## 2018-06-24 LAB
ANION GAP SERPL CALCULATED.3IONS-SCNC: 11 MEQ/L (ref 8–16)
BASOPHILS # BLD: 0.6 %
BASOPHILS ABSOLUTE: 0 THOU/MM3 (ref 0–0.1)
BUN BLDV-MCNC: 43 MG/DL (ref 7–22)
CALCIUM SERPL-MCNC: 9.3 MG/DL (ref 8.5–10.5)
CHLORIDE BLD-SCNC: 104 MEQ/L (ref 98–111)
CO2: 26 MEQ/L (ref 23–33)
CREAT SERPL-MCNC: 1.3 MG/DL (ref 0.4–1.2)
EOSINOPHIL # BLD: 0.1 %
EOSINOPHILS ABSOLUTE: 0 THOU/MM3 (ref 0–0.4)
GFR SERPL CREATININE-BSD FRML MDRD: 53 ML/MIN/1.73M2
GLUCOSE BLD-MCNC: 117 MG/DL (ref 70–108)
HCT VFR BLD CALC: 32.3 % (ref 42–52)
HEMOGLOBIN: 10.8 GM/DL (ref 14–18)
LYMPHOCYTES # BLD: 27.4 %
LYMPHOCYTES ABSOLUTE: 1.7 THOU/MM3 (ref 1–4.8)
MCH RBC QN AUTO: 29.1 PG (ref 27–31)
MCHC RBC AUTO-ENTMCNC: 33.3 GM/DL (ref 33–37)
MCV RBC AUTO: 87.5 FL (ref 80–94)
MONOCYTES # BLD: 7.4 %
MONOCYTES ABSOLUTE: 0.5 THOU/MM3 (ref 0.4–1.3)
NUCLEATED RED BLOOD CELLS: 0 /100 WBC
PDW BLD-RTO: 13.4 % (ref 11.5–14.5)
PLATELET # BLD: 217 THOU/MM3 (ref 130–400)
PMV BLD AUTO: 7.5 FL (ref 7.4–10.4)
POTASSIUM SERPL-SCNC: 4 MEQ/L (ref 3.5–5.2)
RBC # BLD: 3.69 MILL/MM3 (ref 4.7–6.1)
SEG NEUTROPHILS: 64.5 %
SEGMENTED NEUTROPHILS ABSOLUTE COUNT: 4 THOU/MM3 (ref 1.8–7.7)
SODIUM BLD-SCNC: 141 MEQ/L (ref 135–145)
WBC # BLD: 6.2 THOU/MM3 (ref 4.8–10.8)

## 2018-06-24 PROCEDURE — 97110 THERAPEUTIC EXERCISES: CPT

## 2018-06-24 PROCEDURE — 36415 COLL VENOUS BLD VENIPUNCTURE: CPT

## 2018-06-24 PROCEDURE — 97535 SELF CARE MNGMENT TRAINING: CPT

## 2018-06-24 PROCEDURE — 85025 COMPLETE CBC W/AUTO DIFF WBC: CPT

## 2018-06-24 PROCEDURE — 6370000000 HC RX 637 (ALT 250 FOR IP): Performed by: FAMILY MEDICINE

## 2018-06-24 PROCEDURE — 1290000000 HC SEMI PRIVATE OTHER R&B

## 2018-06-24 PROCEDURE — 6370000000 HC RX 637 (ALT 250 FOR IP): Performed by: INTERNAL MEDICINE

## 2018-06-24 PROCEDURE — 80048 BASIC METABOLIC PNL TOTAL CA: CPT

## 2018-06-24 PROCEDURE — A5063 DRAIN OSTOMY POUCH W/FLANGE: HCPCS

## 2018-06-24 PROCEDURE — 97530 THERAPEUTIC ACTIVITIES: CPT

## 2018-06-24 RX ADMIN — FINASTERIDE 5 MG: 5 TABLET, FILM COATED ORAL at 08:55

## 2018-06-24 RX ADMIN — OXYCODONE HYDROCHLORIDE AND ACETAMINOPHEN 500 MG: 500 TABLET ORAL at 08:55

## 2018-06-24 RX ADMIN — PANTOPRAZOLE SODIUM 40 MG: 40 TABLET, DELAYED RELEASE ORAL at 05:43

## 2018-06-24 RX ADMIN — ACETAMINOPHEN 1000 MG: 500 TABLET ORAL at 08:54

## 2018-06-24 RX ADMIN — ACETAMINOPHEN 650 MG: 325 TABLET ORAL at 18:34

## 2018-06-24 RX ADMIN — GUAIFENESIN 600 MG: 600 TABLET, EXTENDED RELEASE ORAL at 08:55

## 2018-06-24 RX ADMIN — ACETAMINOPHEN 1000 MG: 500 TABLET ORAL at 16:48

## 2018-06-24 RX ADMIN — METOPROLOL TARTRATE 25 MG: 25 TABLET ORAL at 08:56

## 2018-06-24 RX ADMIN — OMEGA-3-ACID ETHYL ESTERS 1 CAPSULE: 1 CAPSULE, LIQUID FILLED ORAL at 08:55

## 2018-06-24 RX ADMIN — THERA TABS 1 TABLET: TAB at 08:55

## 2018-06-24 RX ADMIN — ASPIRIN 81 MG: 81 TABLET ORAL at 08:55

## 2018-06-24 RX ADMIN — PANTOPRAZOLE SODIUM 40 MG: 40 TABLET, DELAYED RELEASE ORAL at 16:51

## 2018-06-24 RX ADMIN — FERROUS SULFATE TAB 325 MG (65 MG ELEMENTAL FE) 325 MG: 325 (65 FE) TAB at 08:55

## 2018-06-24 ASSESSMENT — PAIN SCALES - GENERAL
PAINLEVEL_OUTOF10: 5
PAINLEVEL_OUTOF10: 7
PAINLEVEL_OUTOF10: 5
PAINLEVEL_OUTOF10: 6
PAINLEVEL_OUTOF10: 0
PAINLEVEL_OUTOF10: 6
PAINLEVEL_OUTOF10: 0

## 2018-06-24 NOTE — PROGRESS NOTES
[place]  INPATIENT PHYSICAL THERAPY  DAILY NOTE  [facility] 8E - [id number]-67/067-A    Time In: 9251  Time Out: 1416  Timed Code Treatment Minutes: 31 Minutes  Minutes: 31          Date: 2018  Patient Name: Mary Carmen Costello,  Gender:  male        MRN: [id number]  : 1934  (80 y.o.)  Referral Date : 18  Referring Practitioner: Ordering: Molly Hernandez MD Attending: GM Rausch MD  Diagnosis: R humerus fracture s/p fall  Additional Pertinent Hx: Mary Carmen Costello is a 80 y.o. male who presents to the Emergency Department via EMS for the evaluation of a fall. Patient reports a history of hypertension, hyperlipidemia, CVA, CAD, coronary angioplasty with stent placement, cardiac catheterization, and colectomy. He states that he slipped while walking down his stairs and landed on his right shoulder. He denies that he hit his head or lost consciousness, but reports that he is on Plavix. Patient rates his right shoulder pain at a 9/10 in severity, and states that the pain is sharp and aching in quality. He reports that the pain is continuous in duration. Patient denies experiencing any chest pain, neck pain, headache, visual changes, weakness, numbness, tingling, fever, chills, dizziness, lightheadedness, or cough. He reports that he ate breakfast this morning. He denies any history of previous right shoulder injuries. Patient denies further complaints at initial encounter. Non surgical treatment by ortho. To TCU on .      Past Medical History:   Diagnosis Date    [facility])     Arthritis     knees    CAD (coronary artery disease)     CVA (cerebral infarction)     Hyperlipidemia     Hypertension     Iron deficiency anemia     Stroke (cerebrum) ([facility] [facility]      Past Surgical History:   Procedure Laterality Date    BACK SURGERY  ,    [address] [place] [address]    COLECTOMY  2017    Sigmoid Colon Resection, Colostomy, Mobilization Splenic Flexure, Drainage of LLQ Abcess Stairs to enter with rails  Entrance Stairs - Number of Steps: 1 railing with 2 steps to enter     Objective:     Exercises:  Exercises  Comments: with freq. pauses,encouragement and vc's pt. completed 15 reps each b le supine ap's, quad/glute sets, saq, heelslides, hip abd/add, SLR, all for strengthening, endurance       Activity Tolerance:  Activity Tolerance: Patient Tolerated treatment well  Activity Tolerance: self limting. no motivation. pt. c/o no pain throughout ther. ex completion this pm however frequently when therapy is attempted, pt. refuses due to reported \"too much pain\"    Assessment: Body structures, Functions, Activity limitations: Decreased functional mobility , Decreased strength, Decreased endurance, Decreased balance, Decreased cognition, Decreased safe awareness  Assessment: Patient tolerated session fairly well. Patient declined exercises this session. Patient left up in chair for lunch at end of session. Patient would benefit from continued skilled physical therapy to decrease risk for falls and return to PLOF. Prognosis: Fair  Discharge Recommendations: Subacute/Skilled Nursing Facility, ECF with PT, Patient would benefit from continued therapy after discharge, 24 hour supervision or assist    Patient Education:  Patient Education: bed mobility, transfers, gait, standing balance/tolerance, ther. ex review    Equipment Recommendations: Other: continue to monitor, unsure of DME at home    Safety:  Type of devices:  All fall risk precautions in place, Patient at risk for falls, Telesitter in use, Gait belt, Nurse notified, Call light within reach, Left in bed, Bed alarm in place  Restraints  Initially in place: No    Plan:  Times per week: 6x  Times per day: Daily  Specific instructions for Next Treatment: gait training, balance training, stair negotiation, LE strengthening  Current Treatment Recommendations: Strengthening, Functional Mobility Training, Transfer Training, Endurance

## 2018-06-25 PROCEDURE — 6360000002 HC RX W HCPCS: Performed by: INTERNAL MEDICINE

## 2018-06-25 PROCEDURE — 97110 THERAPEUTIC EXERCISES: CPT

## 2018-06-25 PROCEDURE — 97530 THERAPEUTIC ACTIVITIES: CPT

## 2018-06-25 PROCEDURE — 97535 SELF CARE MNGMENT TRAINING: CPT

## 2018-06-25 PROCEDURE — A4409 OST SKN BARR CONVEX <=4 SQ I: HCPCS

## 2018-06-25 PROCEDURE — 1290000000 HC SEMI PRIVATE OTHER R&B

## 2018-06-25 PROCEDURE — 6370000000 HC RX 637 (ALT 250 FOR IP): Performed by: FAMILY MEDICINE

## 2018-06-25 PROCEDURE — 6370000000 HC RX 637 (ALT 250 FOR IP): Performed by: INTERNAL MEDICINE

## 2018-06-25 PROCEDURE — 97116 GAIT TRAINING THERAPY: CPT

## 2018-06-25 RX ADMIN — ENOXAPARIN SODIUM 40 MG: 40 INJECTION, SOLUTION INTRAVENOUS; SUBCUTANEOUS at 20:25

## 2018-06-25 RX ADMIN — DOCUSATE SODIUM 100 MG: 100 CAPSULE, LIQUID FILLED ORAL at 20:25

## 2018-06-25 RX ADMIN — METOPROLOL TARTRATE 25 MG: 25 TABLET ORAL at 09:10

## 2018-06-25 RX ADMIN — FERROUS SULFATE TAB 325 MG (65 MG ELEMENTAL FE) 325 MG: 325 (65 FE) TAB at 20:25

## 2018-06-25 RX ADMIN — DOXAZOSIN 4 MG: 4 TABLET ORAL at 20:25

## 2018-06-25 RX ADMIN — ACETAMINOPHEN 1000 MG: 500 TABLET ORAL at 09:10

## 2018-06-25 RX ADMIN — ACETAMINOPHEN 1000 MG: 500 TABLET ORAL at 03:59

## 2018-06-25 RX ADMIN — GUAIFENESIN 600 MG: 600 TABLET, EXTENDED RELEASE ORAL at 20:25

## 2018-06-25 RX ADMIN — METOPROLOL TARTRATE 25 MG: 25 TABLET ORAL at 20:25

## 2018-06-25 RX ADMIN — PRAVASTATIN SODIUM 80 MG: 80 TABLET ORAL at 20:25

## 2018-06-25 RX ADMIN — DIPHENHYDRAMINE HCL 25 MG: 25 TABLET ORAL at 20:25

## 2018-06-25 RX ADMIN — ACETAMINOPHEN 1000 MG: 500 TABLET ORAL at 20:25

## 2018-06-25 RX ADMIN — ASPIRIN 81 MG: 81 TABLET ORAL at 09:10

## 2018-06-25 RX ADMIN — ACETAMINOPHEN 1000 MG: 500 TABLET ORAL at 14:47

## 2018-06-25 ASSESSMENT — PAIN DESCRIPTION - ORIENTATION
ORIENTATION: RIGHT
ORIENTATION: RIGHT

## 2018-06-25 ASSESSMENT — PAIN DESCRIPTION - LOCATION
LOCATION: ARM;SHOULDER
LOCATION: ARM;SHOULDER

## 2018-06-25 ASSESSMENT — PAIN DESCRIPTION - PAIN TYPE: TYPE: ACUTE PAIN

## 2018-06-25 ASSESSMENT — PAIN SCALES - GENERAL
PAINLEVEL_OUTOF10: 9
PAINLEVEL_OUTOF10: 7
PAINLEVEL_OUTOF10: 5
PAINLEVEL_OUTOF10: 8

## 2018-06-25 ASSESSMENT — PAIN SCALES - WONG BAKER: WONGBAKER_NUMERICALRESPONSE: 8

## 2018-06-25 NOTE — PROGRESS NOTES
Patient continues to repetitively call out for \"mama\". When staff respond to yelling out, patient states that he needs something but can not recall what it is. This done several times this morning. This nurse responded 6 times in under an hour. Patient assisted to bathroom then wheelchair and brought out to nurses station. Yelling out has ceased while sitting out with staff.

## 2018-06-25 NOTE — PROGRESS NOTES
Exploratory Laparotomy - Dr. Wesley Richardson  2009    [address] [facility] CATH LAB PROCEDURE             Subjective       Subjective: Split session this date d/t pt initially agreeing and then refusing any more therapy and was later agreeable to get up and go to bathroom and complete ADLs              Pain:   no number would yell in pain at times for RUE       Objective  Cognition Comment: confusion, decreased STM, inappropriate at times, decreased problem solving,s afety awareness and insight           ADL  Grooming: Contact guard assistance (standing at sink to complete oral care and wash face, min cues to not use RUE with tasks and keep arm at side, stood X 8 minutes)  UE Dressing: Maximum assistance (to don sling X 2 events)  Toileting: Contact guard assistance (standing to urinate)          Bed mobility  Supine to Sit: Minimal assistance  Sit to Supine: Minimal assistance  Scooting: Contact guard assistance  Comment: vc for technique    Transfers  Sit to stand: Contact guard assistance (from EOB)  Stand to sit: Contact guard assistance (to EOB)       Balance  Sitting Balance: Contact guard assistance  Standing Balance: Contact guard assistance     Time: > 8 minutes during ADLs  Comment: cues for safety, no follow through  Functional - Mobility Device: Hemiwalker  Activity: To/from bathroom  Assist Level: Contact guard assistance  Functional Mobility Comments: no LOB               Comment: pt only able to toelarte min  AROM X 3 reps before refusing to complete any additional exercises d/t pain and needing to rest, unable to redirect             Activity Tolerance:  Activity Tolerance: Treatment limited secondary to decreased cognition;Patient limited by pain    Assessment:   Pt has made limited progress towards goals. Pt has met 1/4 STGs and 0/2 LTGs. Progress limited secondary to cognition.  Pt is now able to Frame for Long term goals : 2 weeks   Long term goal 1: Pt will complete RUE distal AROM and LUE light resistive exercises with mod vc to increase ease of transfers and self cares. NOT MET CONTINUE  Long term goal 2: Pt will complete functional mobility to/from therapy gym with CGA and mod vc for safety to increase indep with self cares. NOT MET CONTINUE     Revised Short-Term Goals  Short term goals  Time Frame for Short term goals: 1 week  Short term goal 1: Pt will complete ADL routine with no  >mod cues for safe and adapted tech within shoulder precations and Mod A to increase independence in self care routine  Short term goal 2: Pt will complte basic transfers with SBA and min cues for claire walker tech to increase independence with toileting. Short term goal 3: Pt will tolerate standing x 5 min with SBA to increase endurance for sinkside AD routine. Short term goal 4: Pt/ family will assist with donning sling with no > min A and mod cues to increase independene in self care tasks. Long term goals  Time Frame for Long term goals : 2 weeks   Long term goal 1: Pt will complete RUE distal AROM and LUE light resistive exercises with mod vc to increase ease of transfers and self cares. Long term goal 2: Pt will complete functional mobility to/from therapy gym with CGA and mod vc for safety to increase indep with self cares.

## 2018-06-25 NOTE — PROGRESS NOTES
Patient is added to [facility]FFIL[facility] OF [facility] list for psyche consult.

## 2018-06-25 NOTE — PATIENT CARE CONFERENCE
and aide 3x week  Equipment at Discharge: Needs: BSC and shower chair  Factors facilitating achievement of predicted outcomes: Family support  Barriers to the achievement of predicted outcomes: Pain, Cognitive deficit, Limited safety awareness, Decreased endurance, Decreased proprioception, Upper extremity weakness, Lower extremity weakness and Long standing deficits    Readmission Risk              Risk of Unplanned Readmission:        26           High (20-31)         Team Members Present at Conference:  Physician: Dr. Jaci Wagner MD  Nurse: Raul Mcgee RN, Dawna TRIPATHI  :  SRINIVASA Ren  Occupational Therapist:  DEMOND Giles  Physical Therapist:   ANA  Speech Therapist: Speech Therapist: N/A. All team members have reviewed and updated as necessary and appropriate the established interdisciplinary plan of care within the medical record of Redd Bermeo. Pt's team conference attended (see above). Pt seen on rounds with LSW, to review the results with patient and family. Discussed length of stay as above. Pt's team conference attended (see above.)  Pt seen on rounds with LSW, to review the results with patient and family. Discussed length of stay as above.     Physical Exam:  General:  Alert and oriented  Vitals:   Vitals:    06/25/18 2015   BP: 136/61   Pulse: 83   Resp: 18   Temp: 98.4 °F (36.9 °C)   SpO2: 95%     Heart:  Regular rate and rhythm  Lungs:  Clear to auscultation  Abdomen:  soft with positive bowel sounds     Assessment:  Progressing in full rehabilitation program (see above)    Plan:  Continue Rehab            Continue current medications            Spent 37 minutes today in patient management and care    Electronically signed by Kirsten Molina MD on 6/26/2018 at 9:19 AM

## 2018-06-25 NOTE — PROGRESS NOTES
Exploratory Laparotomy - Dr. Kacie Lowry  2009    [address] [facility] CATH LAB PROCEDURE         Restrictions/Precautions:  Weight Bearing, General Precautions, Fall Risk, Contact Precautions     Right Upper Extremity Weight Bearing: Non Weight Bearing  Other position/activity restrictions: Pt has a h/o of an anoxic brain injury  Right Upper Extremity Brace/Splint: Sling    Prior Level of Function:  ADL Assistance: Independent  Homemaking Assistance: Independent  Ambulation Assistance: Independent  Transfer Assistance: Independent  Additional Comments: all information gathered from chart review; spouse or caregiver not present and pt not able to appropriately answer questions d/t history of dementia    Subjective:  Response To Previous Treatment: Patient with no complaints from previous session. Family / Caregiver Present: No  Subjective: Pt in wheelchair at nurses station upon arrival, pleasantly confused, and agreeable to PT session. Pain:  Yes. Pain Assessment  Belcher-Torres Pain Rating: Hurts whole lot  Pain Location: Arm; Shoulder  Pain Orientation: Right     Social/Functional:  Lives With: Spouse  Type of Home: House  Home Layout: Two level, 1/2 bath on main level, Bed/Bath upstairs  Home Access: Stairs to enter with rails  Entrance Stairs - Number of Steps: 1 railing with 2 steps to enter     Objective:  Supine to Sit: Unable to assess  Sit to Supine: Unable to assess    Transfers  Sit to Stand: Stand by assistance;Contact guard assistance (from wheelchair and standard chair; verbal cues for hand placement)  Stand to sit: Stand by assistance;Contact guard assistance (to standard chair and bedside chair; verbal cues for hand placement)       Ambulation 1  Surface: level tile  Device: Hemiwalker  Other Apparatus: Wheelchair follow  Assistance: Contact guard assistance  Quality of Gait: decreased velocity, decreassed dori, downward gaze, forward flexed posture, fair B heel strike and B foot clearance  Distance: 50'x1  Comments: verbal cues for hemiwalker placement and sequencing with poor carryover; pt repeatedly picked Hemiwalker up and drug it with him - verbal cues to correct with poor carryover  Ambulation 2  Surface - 2: level tile  Device 2: No device  Other Apparatus 2: Wheelchair follow  Assistance 2: Contact guard assistance  Quality of Gait 2: slightly antalgic with decreased R stance time, decreased velocity, decreassed dori, downward gaze, forward flexed posture, fair B heel strike and B foot clearance  Distance: 70'x1  Comments: mild unsteadiness with no LOB noted; pt yells out in pain at B knees       Balance  Comments: pt performed ring toss while reaching outside EDDI to retrieve rings at [facility] with no UE support x 3 trials - mild unsteadiness with no LOB noted; pt performed bean bag toss at [facility] with no UE support x1 trial - no unsteadiness noted; pt required minimal encouragement to complete tasks; balance activities completed to improve funtional mobility and decrease risk of falls    Exercises:  Exercises  Comments: pt performed seated BLE ankle pumps, LAQs, marches, hip abduction, and hip adduction with ball between knees - verbal and visual cues required for proper performance; exercises completed to increase strength and LE endurance for prolonged ambulation and return to PLOF     Activity Tolerance:  Activity Tolerance: Patient Tolerated treatment well;Patient limited by pain    Assessment: Body structures, Functions, Activity limitations: Decreased functional mobility , Decreased strength, Decreased safe awareness, Decreased cognition, Decreased endurance, Decreased balance  Assessment: Pt tolerated therapy session fairly well. PT sessions are limited by pt c/o pain \"all over body. \" Pt has met 0/3 STGs.  Pt progress is limited d/t pt refusal to fully participate in PT sessions, poor

## 2018-06-26 PROCEDURE — 97535 SELF CARE MNGMENT TRAINING: CPT

## 2018-06-26 PROCEDURE — 97530 THERAPEUTIC ACTIVITIES: CPT

## 2018-06-26 PROCEDURE — 1290000000 HC SEMI PRIVATE OTHER R&B

## 2018-06-26 PROCEDURE — 0220000000 HC SKILLED NURSING FACILITY

## 2018-06-26 PROCEDURE — 6370000000 HC RX 637 (ALT 250 FOR IP): Performed by: INTERNAL MEDICINE

## 2018-06-26 PROCEDURE — 6360000002 HC RX W HCPCS: Performed by: INTERNAL MEDICINE

## 2018-06-26 PROCEDURE — 6370000000 HC RX 637 (ALT 250 FOR IP): Performed by: FAMILY MEDICINE

## 2018-06-26 RX ADMIN — ACETAMINOPHEN 1000 MG: 500 TABLET ORAL at 10:39

## 2018-06-26 RX ADMIN — ACETAMINOPHEN 1000 MG: 500 TABLET ORAL at 20:37

## 2018-06-26 RX ADMIN — METOPROLOL TARTRATE 25 MG: 25 TABLET ORAL at 10:39

## 2018-06-26 RX ADMIN — PANTOPRAZOLE SODIUM 40 MG: 40 TABLET, DELAYED RELEASE ORAL at 05:26

## 2018-06-26 RX ADMIN — GUAIFENESIN 600 MG: 600 TABLET, EXTENDED RELEASE ORAL at 10:40

## 2018-06-26 RX ADMIN — ASPIRIN 81 MG: 81 TABLET ORAL at 10:40

## 2018-06-26 RX ADMIN — FERROUS SULFATE TAB 325 MG (65 MG ELEMENTAL FE) 325 MG: 325 (65 FE) TAB at 20:38

## 2018-06-26 RX ADMIN — DICLOFENAC SODIUM 2 G: 10 GEL TOPICAL at 23:59

## 2018-06-26 RX ADMIN — ENOXAPARIN SODIUM 40 MG: 40 INJECTION, SOLUTION INTRAVENOUS; SUBCUTANEOUS at 20:37

## 2018-06-26 RX ADMIN — FINASTERIDE 5 MG: 5 TABLET, FILM COATED ORAL at 10:39

## 2018-06-26 RX ADMIN — DOXAZOSIN 4 MG: 4 TABLET ORAL at 20:38

## 2018-06-26 RX ADMIN — ACETAMINOPHEN 1000 MG: 500 TABLET ORAL at 17:41

## 2018-06-26 RX ADMIN — GUAIFENESIN 600 MG: 600 TABLET, EXTENDED RELEASE ORAL at 20:37

## 2018-06-26 RX ADMIN — PRAVASTATIN SODIUM 80 MG: 80 TABLET ORAL at 20:37

## 2018-06-26 RX ADMIN — METOPROLOL TARTRATE 25 MG: 25 TABLET ORAL at 20:37

## 2018-06-26 RX ADMIN — ACETAMINOPHEN 1000 MG: 500 TABLET ORAL at 04:01

## 2018-06-26 RX ADMIN — DIPHENHYDRAMINE HCL 25 MG: 25 TABLET ORAL at 20:37

## 2018-06-26 ASSESSMENT — PAIN SCALES - GENERAL
PAINLEVEL_OUTOF10: 4
PAINLEVEL_OUTOF10: 5
PAINLEVEL_OUTOF10: 4
PAINLEVEL_OUTOF10: 5
PAINLEVEL_OUTOF10: 4
PAINLEVEL_OUTOF10: 6

## 2018-06-26 NOTE — PROGRESS NOTES
injury  Right Upper Extremity Brace/Splint: Sling    Prior Level of Function:  ADL Assistance: Independent  Homemaking Assistance: Independent  Ambulation Assistance: Independent  Transfer Assistance: Independent  Additional Comments: all information gathered from chart review; spouse or caregiver not present and pt not able to appropriately answer questions d/t history of dementia    Subjective   Subjective: Pt resting in bed upon arrival with nahomi Lau present, agreeable to OT with mod encouragement  Comments: Multiple breaks throughout session d/t pt reports fatigue    Pain:  Pain Assessment  Patient Currently in Pain: Yes (c/o pain in back, legs, and shoulder)     Objective  Overall Cognitive Status: Exceptions  Cognition Comment: confusion, inappropriate at times     ADL  Grooming: Contact guard assistance (standing sinkside for light grooming tasks, pt declined oral care)  UE Dressing: Maximum assistance (donning sling)  Additional Comments: Pt declined toileting this am      Bed mobility  Supine to Sit: Minimal assistance (trunk and initiating BLEs)  Sit to Supine: Minimal assistance (BLEs)  Scooting: Contact guard assistance (to scoot back in bedside chair)    Transfers  Sit to stand: Contact guard assistance  Stand to sit: Contact guard assistance  Transfer Comments: vc's for walker use     Balance  Sitting Balance: Contact guard assistance (to close SBA at times)  Standing Balance: Contact guard assistance     Time: x1-2 min  Activity: grooming tasks, prep to ambulate     Functional Mobility  Functional - Mobility Device: Hemiwalker  Activity: To/from bathroom (within hallway)  Assist Level: Contact guard assistance  Functional Mobility Comments: Pt ambulated within hallway to nursing station with extended seated reste break required, no LOB and min cues for claire walker use         Activity Tolerance:  Activity Tolerance: Patient limited by fatigue;Treatment limited secondary to decreased cognition;Patient limited by pain  Activity Tolerance: perseverative on eating breakfast and returning to bed d/t fatigue. Pt requiring to lay down x1 attempt for 2-3 min d/t fatigue in session    Assessment:     Performance deficits / Impairments: Decreased functional mobility , Decreased safe awareness, Decreased balance, Decreased ADL status, Decreased cognition, Decreased ROM, Decreased endurance, Decreased strength, Decreased high-level IADLs  Prognosis: Poor  Discharge Recommendations: Patient would benefit from continued therapy after discharge, 24 hour supervision or assist    Patient Education:  Patient Education: importance of OT, claire walker use  Barriers to Learning: Cognition     Equipment Recommendations:  Equipment Needed: No  Other: Continue to monitor. Safety:  Safety Devices in place: Yes  Type of devices: All fall risk precautions in place, Call light within reach, Chair alarm in place, Patient at risk for falls, Gait belt, Left in chair (sitter Judi present, pt eating breakfast)    Plan:  Times per week: 6x   Current Treatment Recommendations: Endurance Training, Patient/Caregiver Education & Training, Equipment Evaluation, Education, & procurement, Self-Care / ADL, Balance Training, Functional Mobility Training, Safety Education & Training, ROM    Goals:  Patient goals : Decreased R shoulder pain    Short term goals  Time Frame for Short term goals: 1 week  Short term goal 1: Pt will complete ADL routine with no  >mod cues for safe and adapted tech within shoulder precations and Mod A to increase independence in self care routine  Short term goal 2: Pt will complte basic transfers with SBA and min cues for claire walker tech to increase independence with toileting. Short term goal 3: Pt will tolerate standing x 5 min with SBA to increase endurance for sinkside AD routine.    Short term goal 4: Pt/ family will assist with donning sling with no > min A and mod cues to increase independene in

## 2018-06-26 NOTE — PLAN OF CARE
Problem: Activity:  Goal: Sleeping patterns will improve      Outcome: Ongoing  Pt has had rest periods throughout the day. Problem: Bleeding:  Goal: Will show no signs and symptoms of excessive bleeding      Outcome: Ongoing  No s/s of excessive bleeding or coagulation. Will continue to monitor. Problem: Pain:  Goal: Pain level will decrease      Outcome: Ongoing  Pain medication administered as ordered. Problem: Bowel/Gastric:  Goal: Will be independent with ostomy care        Outcome: Ongoing  Pt is not independent of ostomy care. Problem: Skin Integrity:  Goal: Risk for impaired skin integrity will decrease      Outcome: Ongoing  Pt is able to reposition self. Problem: Falls - Risk of:  Goal: Will remain free from falls      Outcome: Ongoing      Problem: Nutrition  Goal: Optimal nutrition therapy  Outcome: Ongoing  Optimal nutrition provided daily. Problem: DISCHARGE BARRIERS  Goal: Patient's continuum of care needs are met  Outcome: Ongoing  Pt's daily needs are met. Problem: Pressure Ulcer - Risk of  Goal: Absence of pressure ulcer  Outcome: Ongoing  Pt has been absent of pressure ulcers. Pt is good at repositioning self. Problem: Urinary Elimination - Impaired  Goal: Decrease in number of episodes of urinary incontinence  Outcome: Ongoing  Pt has been continent throughout the shift. Calls out when he needs to use the restroom. Problem: Mental Status - Risk of, Impaired  Goal: Mental status within normal limits for patient  Outcome: Ongoing  Mental status is WNL for patient. Pt had an anoxic brain injury. Problem: Disruptive Behavior  Goal: Appropriate behavior observed  Outcome: Ongoing  Pt has been appropriate today. Mood has been stable. Comments: Care plan reviewed with patient. Patient verbalizes understanding of the plan of care and contribute to goal setting.

## 2018-06-27 PROCEDURE — 97530 THERAPEUTIC ACTIVITIES: CPT

## 2018-06-27 PROCEDURE — 97116 GAIT TRAINING THERAPY: CPT

## 2018-06-27 PROCEDURE — A4406 PECTIN BASED OSTOMY PASTE: HCPCS

## 2018-06-27 PROCEDURE — 6360000002 HC RX W HCPCS: Performed by: INTERNAL MEDICINE

## 2018-06-27 PROCEDURE — A4409 OST SKN BARR CONVEX <=4 SQ I: HCPCS

## 2018-06-27 PROCEDURE — 97535 SELF CARE MNGMENT TRAINING: CPT

## 2018-06-27 PROCEDURE — 1290000000 HC SEMI PRIVATE OTHER R&B

## 2018-06-27 PROCEDURE — A5063 DRAIN OSTOMY POUCH W/FLANGE: HCPCS

## 2018-06-27 PROCEDURE — 6370000000 HC RX 637 (ALT 250 FOR IP): Performed by: INTERNAL MEDICINE

## 2018-06-27 PROCEDURE — 6370000000 HC RX 637 (ALT 250 FOR IP): Performed by: FAMILY MEDICINE

## 2018-06-27 RX ADMIN — METOPROLOL TARTRATE 25 MG: 25 TABLET ORAL at 20:29

## 2018-06-27 RX ADMIN — ACETAMINOPHEN 1000 MG: 500 TABLET ORAL at 08:39

## 2018-06-27 RX ADMIN — DOXAZOSIN 4 MG: 4 TABLET ORAL at 20:29

## 2018-06-27 RX ADMIN — ACETAMINOPHEN 1000 MG: 500 TABLET ORAL at 15:15

## 2018-06-27 RX ADMIN — DOCUSATE SODIUM 100 MG: 100 CAPSULE, LIQUID FILLED ORAL at 08:41

## 2018-06-27 RX ADMIN — DIPHENHYDRAMINE HCL 25 MG: 25 TABLET ORAL at 20:28

## 2018-06-27 RX ADMIN — ACETAMINOPHEN 1000 MG: 500 TABLET ORAL at 05:33

## 2018-06-27 RX ADMIN — PRAVASTATIN SODIUM 80 MG: 80 TABLET ORAL at 20:29

## 2018-06-27 RX ADMIN — ENOXAPARIN SODIUM 40 MG: 40 INJECTION, SOLUTION INTRAVENOUS; SUBCUTANEOUS at 20:28

## 2018-06-27 RX ADMIN — GUAIFENESIN 600 MG: 600 TABLET, EXTENDED RELEASE ORAL at 20:29

## 2018-06-27 RX ADMIN — FERROUS SULFATE TAB 325 MG (65 MG ELEMENTAL FE) 325 MG: 325 (65 FE) TAB at 20:29

## 2018-06-27 RX ADMIN — METOPROLOL TARTRATE 25 MG: 25 TABLET ORAL at 08:40

## 2018-06-27 RX ADMIN — ASPIRIN 81 MG: 81 TABLET ORAL at 08:39

## 2018-06-27 RX ADMIN — POLYETHYLENE GLYCOL 3350 17 G: 17 POWDER, FOR SOLUTION ORAL at 08:41

## 2018-06-27 RX ADMIN — FINASTERIDE 5 MG: 5 TABLET, FILM COATED ORAL at 08:40

## 2018-06-27 RX ADMIN — GUAIFENESIN 600 MG: 600 TABLET, EXTENDED RELEASE ORAL at 08:39

## 2018-06-27 RX ADMIN — DICLOFENAC SODIUM 2 G: 10 GEL TOPICAL at 20:29

## 2018-06-27 RX ADMIN — PANTOPRAZOLE SODIUM 40 MG: 40 TABLET, DELAYED RELEASE ORAL at 15:15

## 2018-06-27 RX ADMIN — PANTOPRAZOLE SODIUM 40 MG: 40 TABLET, DELAYED RELEASE ORAL at 05:33

## 2018-06-27 ASSESSMENT — PAIN DESCRIPTION - ORIENTATION: ORIENTATION: RIGHT

## 2018-06-27 ASSESSMENT — PAIN DESCRIPTION - LOCATION: LOCATION: ARM;SHOULDER

## 2018-06-27 ASSESSMENT — PAIN DESCRIPTION - PAIN TYPE: TYPE: ACUTE PAIN

## 2018-06-27 ASSESSMENT — PAIN SCALES - GENERAL
PAINLEVEL_OUTOF10: 3
PAINLEVEL_OUTOF10: 1
PAINLEVEL_OUTOF10: 7
PAINLEVEL_OUTOF10: 3
PAINLEVEL_OUTOF10: 3
PAINLEVEL_OUTOF10: 5
PAINLEVEL_OUTOF10: 5
PAINLEVEL_OUTOF10: 3

## 2018-06-27 NOTE — PLAN OF CARE
observed  Outcome: Ongoing  Mood has been stable. Pt is cooperative with care. Problem: HH SN OSTOMY/ILEOSTOMY CARE  Goal: Will be independent with ostomy care  Will be independent with ostomy care     Outcome: Ongoing  Pt is dependable on staff for ostomy care. Glycolax was given this a.m. As his stool appears thick in the colostomy bag. Comments: Care plan reviewed with patient. Patient verbalizes understanding of the plan of care and contribute to goal setting.

## 2018-06-27 NOTE — PROGRESS NOTES
[facility] 60  INPATIENT OCCUPATIONAL THERAPY  [facility] TCU 8E  DAILY NOTE    Time:  Time In: 8834  Time Out: 1218  Timed Code Treatment Minutes: 29 Minutes  Minutes: 29     Date: 2018  Patient Name: Meg Rock,   Gender: male      Room: -67/067-A  MRN: [id number]  : 1934  (80 y.o.)  Referring Practitioner: Ordering: Dorris Buerger, MD Attending: Dr. Becka Bolden  Diagnosis: humerus head fracture right closed   Additional Pertinent Hx: The patient is a 80 y.o. male  who presents with a history of a fall onto his right shoulder. Severe pain, sharp in character, worse with any motion. Seen in the ED and films revealed a non displaced right proximal humerus fracture. Ortho recommended sling and conservative treatment. Patient was having severe pain and unable to be discharged from the ED. He was seen on 7K and still with significant right shoulder pain, sharp, denied numbness or tingling in the right hand. He also denies any other injuries at this time. . Pt was admitted to [facility] on 18.      Past Medical History:   Diagnosis Date    Anoxic brain injury [facility])     Arthritis     knees    CAD (coronary artery disease)     CVA (cerebral infarction)     Hyperlipidemia     Hypertension     Iron deficiency anemia     Stroke (cerebrum) ([facility] [address]      Past Surgical History:   Procedure Laterality Date    BACK SURGERY  ,    [facility] [place]    COLECTOMY  2017    Sigmoid Colon Resection, Colostomy, Mobilization Splenic Flexure, Drainage of LLQ Abcess Exploratory Laparotomy - Dr. Cuauhtemoc Rojas      [address] [facility] CATH LAB PROCEDURE         Restrictions/Precautions:  Weight Bearing, General Precautions, Fall Risk, Contact Precautions      Right Upper Extremity Weight Bearing: Non Weight Bearing  Other position/activity restrictions: Pt has a h/o of an anoxic brain assistance       Activity Tolerance:  Activity Tolerance: Patient limited by fatigue;Treatment limited secondary to decreased cognition;Patient limited by pain    Assessment:     Performance deficits / Impairments: Decreased functional mobility , Decreased safe awareness, Decreased balance, Decreased ADL status, Decreased cognition, Decreased ROM, Decreased endurance, Decreased strength, Decreased high-level IADLs  Prognosis: Poor  Discharge Recommendations: Patient would benefit from continued therapy after discharge, 24 hour supervision or assist    Patient Education:  Patient Education: Use of sling and swath  Barriers to Learning: Cognition     Equipment Recommendations:  Equipment Needed: No  Other: Continue to monitor. Safety:  Safety Devices in place: Yes  Type of devices: All fall risk precautions in place, Call light within reach, Chair alarm in place, Patient at risk for falls, Gait belt, Left in chair, Nurse notified (Telesitter)    Plan:  Times per week: 6x   Current Treatment Recommendations: Endurance Training, Patient/Caregiver Education & Training, Equipment Evaluation, Education, & procurement, Self-Care / ADL, Balance Training, Functional Mobility Training, Safety Education & Training, ROM    Goals:  Patient goals : Decreased R shoulder pain    Short term goals  Time Frame for Short term goals: 1 week  Short term goal 1: Pt will complete ADL routine with no  >mod cues for safe and adapted tech within shoulder precations and Mod A to increase independence in self care routine  Short term goal 2: Pt will complte basic transfers with SBA and min cues for claire walker tech to increase independence with toileting. Short term goal 3: Pt will tolerate standing x 5 min with SBA to increase endurance for sinkside AD routine. Short term goal 4: Pt/ family will assist with donning sling with no > min A and mod cues to increase independene in self care tasks.    Long term goals  Time Frame for Long term

## 2018-06-27 NOTE — PROGRESS NOTES
cues for [facility] placement/how to hold it  Distance: 60'x1 15'x1     Attempted WC propulsion provided hand over hand assist to wheel chair rim however pt c/o L UE pain and states he \"cannot do it\" as it is too difficult. PTA provided encouragement however pt cont to politely decline. Stairs  Curbs: 6\"  Device:  (hemiwalker)  Other Apparatus: Left (railing)  Assistance: Minimal assistance  Comment: cues for technique and encouragement to attempt, pt anxious of falling, cues for [facility] placement with ascent and descent- pt placed HW up on step ascent but then grasped L side hand rail for assist, Pt off balance and weakness in LEs noted needing min A both with ascent and descent. pt utilized [facility] for descent    Balance  Comments: completed balloon toss activitry while seated in wheelchair pt reaching in all directions however hesitant to reach R, pt also kicking balloon with BLEs. pt c/o L shoulder fatigue rest breaks provided as well as encouragement to continue. pt also completed toy gun activity with assist from PTA for improved core stability. pt pushing toy gun back agaist therapist who had other hand  in ordefr to make it shoot the ball at a target,. pt seated in WC throughout    Exercises:  Exercises  Comments: none this session     Activity Tolerance:  Activity Tolerance: Patient Tolerated treatment well;Patient limited by fatigue;Patient limited by pain    Assessment: Body structures, Functions, Activity limitations: Decreased functional mobility , Decreased strength, Decreased endurance, Decreased balance, Decreased cognition  Assessment: pt tolerated session fairly well this date. pt more motivated this date however still requires encouragement. pt generally weak and deconditioned, demos decreased balance and functional tolrance. pt would benefit from cont skilled PT however pt is self imiting due to decreased motivation.   Prognosis: Fair  Discharge Recommendations: [address], DON with

## 2018-06-28 LAB
AMORPHOUS: ABNORMAL
BACTERIA: ABNORMAL /HPF
BILIRUBIN URINE: NEGATIVE
BLOOD, URINE: NEGATIVE
CASTS UA: ABNORMAL /LPF
CHARACTER, URINE: CLEAR
COLOR: YELLOW
CRYSTALS, UA: ABNORMAL
EPITHELIAL CELLS, UA: ABNORMAL /HPF
GLUCOSE URINE: NEGATIVE MG/DL
KETONES, URINE: NEGATIVE
LEUKOCYTE ESTERASE, URINE: ABNORMAL
MUCUS: ABNORMAL
NITRITE, URINE: NEGATIVE
PH UA: 7
PROTEIN UA: 100
RBC URINE: ABNORMAL /HPF
SPECIFIC GRAVITY, URINE: 1.01 (ref 1–1.03)
UROBILINOGEN, URINE: 0.2 EU/DL
WBC UA: ABNORMAL /HPF

## 2018-06-28 PROCEDURE — 97116 GAIT TRAINING THERAPY: CPT

## 2018-06-28 PROCEDURE — 81001 URINALYSIS AUTO W/SCOPE: CPT

## 2018-06-28 PROCEDURE — 6370000000 HC RX 637 (ALT 250 FOR IP): Performed by: FAMILY MEDICINE

## 2018-06-28 PROCEDURE — 6370000000 HC RX 637 (ALT 250 FOR IP): Performed by: INTERNAL MEDICINE

## 2018-06-28 PROCEDURE — 87086 URINE CULTURE/COLONY COUNT: CPT

## 2018-06-28 PROCEDURE — 1290000000 HC SEMI PRIVATE OTHER R&B

## 2018-06-28 PROCEDURE — 97530 THERAPEUTIC ACTIVITIES: CPT

## 2018-06-28 PROCEDURE — 6360000002 HC RX W HCPCS: Performed by: INTERNAL MEDICINE

## 2018-06-28 RX ADMIN — PRAVASTATIN SODIUM 80 MG: 80 TABLET ORAL at 21:20

## 2018-06-28 RX ADMIN — GUAIFENESIN 600 MG: 600 TABLET, EXTENDED RELEASE ORAL at 08:04

## 2018-06-28 RX ADMIN — DOCUSATE SODIUM 100 MG: 100 CAPSULE, LIQUID FILLED ORAL at 08:05

## 2018-06-28 RX ADMIN — ACETAMINOPHEN 650 MG: 325 TABLET ORAL at 19:07

## 2018-06-28 RX ADMIN — METOPROLOL TARTRATE 25 MG: 25 TABLET ORAL at 21:20

## 2018-06-28 RX ADMIN — ACETAMINOPHEN 650 MG: 325 TABLET ORAL at 08:05

## 2018-06-28 RX ADMIN — FINASTERIDE 5 MG: 5 TABLET, FILM COATED ORAL at 08:06

## 2018-06-28 RX ADMIN — DIPHENHYDRAMINE HCL 25 MG: 25 TABLET ORAL at 21:20

## 2018-06-28 RX ADMIN — FLUTICASONE PROPIONATE 1 SPRAY: 50 SPRAY, METERED NASAL at 08:05

## 2018-06-28 RX ADMIN — ACETAMINOPHEN 1000 MG: 500 TABLET ORAL at 17:21

## 2018-06-28 RX ADMIN — THERA TABS 1 TABLET: TAB at 08:04

## 2018-06-28 RX ADMIN — OMEGA-3-ACID ETHYL ESTERS 1 CAPSULE: 1 CAPSULE, LIQUID FILLED ORAL at 08:04

## 2018-06-28 RX ADMIN — ENOXAPARIN SODIUM 40 MG: 40 INJECTION, SOLUTION INTRAVENOUS; SUBCUTANEOUS at 21:21

## 2018-06-28 RX ADMIN — ASPIRIN 81 MG: 81 TABLET ORAL at 08:04

## 2018-06-28 RX ADMIN — OXYCODONE HYDROCHLORIDE AND ACETAMINOPHEN 500 MG: 500 TABLET ORAL at 08:05

## 2018-06-28 RX ADMIN — METOPROLOL TARTRATE 25 MG: 25 TABLET ORAL at 08:04

## 2018-06-28 RX ADMIN — DOCUSATE SODIUM 100 MG: 100 CAPSULE, LIQUID FILLED ORAL at 21:20

## 2018-06-28 RX ADMIN — ACETAMINOPHEN 1000 MG: 500 TABLET ORAL at 06:35

## 2018-06-28 RX ADMIN — FERROUS SULFATE TAB 325 MG (65 MG ELEMENTAL FE) 325 MG: 325 (65 FE) TAB at 21:20

## 2018-06-28 RX ADMIN — GUAIFENESIN 600 MG: 600 TABLET, EXTENDED RELEASE ORAL at 21:20

## 2018-06-28 RX ADMIN — DOXAZOSIN 4 MG: 4 TABLET ORAL at 21:20

## 2018-06-28 RX ADMIN — PANTOPRAZOLE SODIUM 40 MG: 40 TABLET, DELAYED RELEASE ORAL at 17:21

## 2018-06-28 RX ADMIN — FERROUS SULFATE TAB 325 MG (65 MG ELEMENTAL FE) 325 MG: 325 (65 FE) TAB at 08:05

## 2018-06-28 ASSESSMENT — PAIN SCALES - GENERAL
PAINLEVEL_OUTOF10: 3
PAINLEVEL_OUTOF10: 0
PAINLEVEL_OUTOF10: 4
PAINLEVEL_OUTOF10: 3
PAINLEVEL_OUTOF10: 6
PAINLEVEL_OUTOF10: 0
PAINLEVEL_OUTOF10: 2
PAINLEVEL_OUTOF10: 0
PAINLEVEL_OUTOF10: 0

## 2018-06-28 NOTE — PROGRESS NOTES
Alert, cheerful and talkative. Oriented to person, place and time. Pupils ARMIDA 3 to 2 brisk bilaterally. Mucous membranes pale. Hair shiny. Skin pink. Speech clear, no difficulty swallowing. Heart sounds regular. Lung sounds clear anterior/posterior laterally. No cough noted. Bowel sounds active in all 4 quadrants. Last bowel movement this morning. Abdomen soft/round. Abdomen palpated non-tender. States \"is passing gas\". States \"not urinating well\", no pain, burning, with urination. Colostomy noted, no diarrhea, or constipation. Hand grasp strong in left hand, weak in right hand. Skin turgor brisk. Capillary refill less than 3 seconds. Non-pitting edema bilaterally. Strong pedal pulse left foot, weak pedal pulse right foot. Strong pedal push and pull bilaterally. Negative Araceli sign. Leg lift weak. No numberness/ tingling noted. Bed in lowest position, wheels locked, side rails up x2 . Call light within reach. Denies pain. Denies needs.

## 2018-06-28 NOTE — PROGRESS NOTES
[facility]  INPATIENT PHYSICAL THERAPY  DAILY NOTE  [facility] TCU 8E - 8E-67/067-A    Time In: 0710  Time Out: 8768  Timed Code Treatment Minutes: 40 Minutes  Minutes: 44          Date: 2018  Patient Name: Don Burnham,  Gender:  male        MRN: [id number]  : 1934  (80 y.o.)  Referral Date : 18  Referring Practitioner: Ordering: Harjeet Vásquez MD Attending: GM Garcia MD  Diagnosis: R humerus fracture s/p fall  Additional Pertinent Hx: Don Burnham is a 80 y.o. male who presents to the Emergency Department via EMS for the evaluation of a fall. Patient reports a history of hypertension, hyperlipidemia, CVA, CAD, coronary angioplasty with stent placement, cardiac catheterization, and colectomy. He states that he slipped while walking down his stairs and landed on his right shoulder. He denies that he hit his head or lost consciousness, but reports that he is on Plavix. Patient rates his right shoulder pain at a 9/10 in severity, and states that the pain is sharp and aching in quality. He reports that the pain is continuous in duration. Patient denies experiencing any chest pain, neck pain, headache, visual changes, weakness, numbness, tingling, fever, chills, dizziness, lightheadedness, or cough. He reports that he ate breakfast this morning. He denies any history of previous right shoulder injuries. Patient denies further complaints at initial encounter. Non surgical treatment by ortho. To TCU on .      Past Medical History:   Diagnosis Date    Anoxic brain [facility])     Arthritis     knees    CAD (coronary artery disease)     CVA (cerebral infarction)     Hyperlipidemia     Hypertension     Iron deficiency anemia     Stroke (cerebrum) ([facility] [address]      Past Surgical History:   Procedure Laterality Date    [facility]    COLECTOMY  2017    Sigmoid Colon Resection, Colostomy, Mobilization Splenic Flexure, Drainage of LLQ Abcess Exploratory Laparotomy - Dr. Romy Elizabeth  2009    [address] [place] [address] CATH LAB PROCEDURE         Restrictions/Precautions:  Weight Bearing, General Precautions, Fall Risk, Contact Precautions                 Right Upper Extremity Weight Bearing: Non Weight Bearing  Other position/activity restrictions: Pt has a h/o of an anoxic brain injury  Right Upper Extremity Brace/Splint: Sling    Prior Level of Function:  ADL Assistance: Independent  Homemaking Assistance: Independent  Ambulation Assistance: Independent  Transfer Assistance: Independent  Additional Comments: all information gathered from chart review; spouse or caregiver not present and pt not able to appropriately answer questions d/t history of dementia    Subjective:     Subjective: pt in bed upon arrival with student LPN. PT pleasant and agreeable to therapy. pt cont with confusion and mentions pain in B knees throughout. Pt reports his RUE is feeling better    Pain:  Yes (B knees and R UE, did not recieve a #).           Social/Functional:  Lives With: Spouse  Type of Home: House  Home Layout: Two level, 1/2 bath on main level, Bed/Bath upstairs  Home Access: Stairs to enter with rails  Entrance Stairs - Number of Steps: 1 railing with 2 steps to enter     Objective:  Supine to Sit: Moderate assistance (at trunk, cues to sequence)  Scooting: Contact guard assistance (forward and back in seated position)    Transfers  Sit to Stand: Contact guard assistance;Minimal Assistance (min A with fatigue, cues for hand placement)  Stand to sit: Contact guard assistance;Minimal Assistance (min A with fatigue to control descent, cues or hand placement)  Car Transfer: Contact guard assistance (cued pt on hand placement and sequence, good technique, decreased trunk control- pt needing min A to reposition once inside car and to initiate exit)       Ambulation 1  Surface: Stair training, Safety Education & Training, Patient/Caregiver Education & Training, Equipment Evaluation, Education, & procurement    Goals:  Patient goals : go home    Short term goals  Time Frame for Short term goals: 10 days  Short term goal 1: Pt to perform bed mobility at [place] while maintaining UE WB restrictions for getting in and out of bed. - continue  Short term goal 2: Pt to transfer at [place] [address] while maintaining UE WB restrictions in order to get up and down from various surfaces. - continue  Short term goal 3: Pt to ambulate 100' with L hemiwalker at [facility] for walking household distances. - discontinue (refer to LTG)  Short term goal 4: Pt to negotiate two 6\" steps with 1 handrail at [facility] for entering and exiting the home. - not met    Long term goals  Time Frame for Long term goals : 3 weeks  Long term goal 1: Pt to perform bed mobility at [facility] while maintaining UE WB restrictions for getting in and out of bed. Long term goal 2: Pt to transfer at S while maintaining UE WB restrictions in order to get up and down from various surfaces. Long term goal 3: Pt to ambulate 150' with L hemiwalker at [place] [address] for walking household distances. Long term goal 4: Pt to negotiate two 6\" steps with 1 handrail at [facility] for entering and exiting the home. Long term goal 5: Pt to perform car transfer at [place] [address] for safe transfer home.

## 2018-06-28 NOTE — PLAN OF CARE
Problem: Activity:  Goal: Physical symptoms of sleep deprivation will improve      Outcome: Ongoing  Keep room quiet at hour of sleep     Problem: Bleeding:  Goal: Will show no signs and symptoms of excessive bleeding      Outcome: Ongoing  Monitor labs and vitals every shift    Problem: Pain:  Goal: Pain level will decrease      Outcome: Ongoing  Give pain medications as ordered     Problem:  Bowel/Gastric:  Goal: Will be independent with ostomy care        Outcome: Ongoing  Continue with teaching for self care     Problem: Skin Integrity:  Goal: Risk for impaired skin integrity will decrease      Outcome: Ongoing  Skin checks every shift     Problem: Falls - Risk of:  Goal: Will remain free from falls      Outcome: Ongoing  Call light within reach     Problem: Nutrition  Goal: Optimal nutrition therapy  Outcome: Ongoing  Encourage to eat at least 50% of each meal

## 2018-06-28 NOTE — PROGRESS NOTES
Patient was more agitated, and harder to redirect this patient. Patient has had several small voids, and complaining of burning, bladder scan patient and notice patient was holding lower abdomen. Patient's bladder appears to be slightly distended,noted pain in that area. Call out to Dr. Teo Dangelo, awaiting response will continue to monitor patient.

## 2018-06-29 PROCEDURE — 97116 GAIT TRAINING THERAPY: CPT

## 2018-06-29 PROCEDURE — 6360000002 HC RX W HCPCS: Performed by: INTERNAL MEDICINE

## 2018-06-29 PROCEDURE — 6370000000 HC RX 637 (ALT 250 FOR IP): Performed by: INTERNAL MEDICINE

## 2018-06-29 PROCEDURE — 97530 THERAPEUTIC ACTIVITIES: CPT

## 2018-06-29 PROCEDURE — 97110 THERAPEUTIC EXERCISES: CPT

## 2018-06-29 PROCEDURE — 97535 SELF CARE MNGMENT TRAINING: CPT

## 2018-06-29 PROCEDURE — 6370000000 HC RX 637 (ALT 250 FOR IP): Performed by: FAMILY MEDICINE

## 2018-06-29 PROCEDURE — 1290000000 HC SEMI PRIVATE OTHER R&B

## 2018-06-29 RX ADMIN — OXYCODONE HYDROCHLORIDE AND ACETAMINOPHEN 500 MG: 500 TABLET ORAL at 08:57

## 2018-06-29 RX ADMIN — ACETAMINOPHEN 1000 MG: 500 TABLET ORAL at 08:55

## 2018-06-29 RX ADMIN — GUAIFENESIN 600 MG: 600 TABLET, EXTENDED RELEASE ORAL at 08:56

## 2018-06-29 RX ADMIN — FERROUS SULFATE TAB 325 MG (65 MG ELEMENTAL FE) 325 MG: 325 (65 FE) TAB at 08:57

## 2018-06-29 RX ADMIN — ACETAMINOPHEN 1000 MG: 500 TABLET ORAL at 05:16

## 2018-06-29 RX ADMIN — ASPIRIN 81 MG: 81 TABLET ORAL at 08:56

## 2018-06-29 RX ADMIN — ENOXAPARIN SODIUM 40 MG: 40 INJECTION, SOLUTION INTRAVENOUS; SUBCUTANEOUS at 20:07

## 2018-06-29 RX ADMIN — PANTOPRAZOLE SODIUM 40 MG: 40 TABLET, DELAYED RELEASE ORAL at 16:42

## 2018-06-29 RX ADMIN — PANTOPRAZOLE SODIUM 40 MG: 40 TABLET, DELAYED RELEASE ORAL at 05:17

## 2018-06-29 RX ADMIN — METOPROLOL TARTRATE 25 MG: 25 TABLET ORAL at 08:58

## 2018-06-29 RX ADMIN — FINASTERIDE 5 MG: 5 TABLET, FILM COATED ORAL at 08:56

## 2018-06-29 ASSESSMENT — PAIN SCALES - GENERAL
PAINLEVEL_OUTOF10: 5
PAINLEVEL_OUTOF10: 5
PAINLEVEL_OUTOF10: 6
PAINLEVEL_OUTOF10: 4

## 2018-06-29 ASSESSMENT — PAIN DESCRIPTION - LOCATION: LOCATION: ARM;SHOULDER

## 2018-06-29 ASSESSMENT — PAIN DESCRIPTION - ORIENTATION: ORIENTATION: RIGHT

## 2018-06-29 ASSESSMENT — PAIN DESCRIPTION - PAIN TYPE: TYPE: ACUTE PAIN

## 2018-06-29 NOTE — PROGRESS NOTES
status, Decreased cognition, Decreased ROM, Decreased endurance, Decreased strength, Decreased high-level IADLs  Prognosis: Poor  Discharge Recommendations: Patient would benefit from continued therapy after discharge, 24 hour supervision or assist    Patient Education:  Patient Education: importance of moving  Barriers to Learning: Cognition     Equipment Recommendations:  Equipment Needed: No  Other: Continue to monitor. Safety:  Safety Devices in place: Yes  Type of devices: All fall risk precautions in place, Call light within reach, Patient at risk for falls, Gait belt, Left in bed, Bed alarm in place (Telesitter)    Plan:  Times per week: 6x   Current Treatment Recommendations: Endurance Training, Patient/Caregiver Education & Training, Equipment Evaluation, Education, & procurement, Self-Care / ADL, Balance Training, Functional Mobility Training, Safety Education & Training, ROM    Goals:  Patient goals : Decreased R shoulder pain    Short term goals  Time Frame for Short term goals: 1 week  Short term goal 1: Pt will complete ADL routine with no  >mod cues for safe and adapted tech within shoulder precations and Mod A to increase independence in self care routine  Short term goal 2: Pt will complte basic transfers with SBA and min cues for claire walker tech to increase independence with toileting. Short term goal 3: Pt will tolerate standing x 5 min with SBA to increase endurance for sinkside AD routine. Short term goal 4: Pt/ family will assist with donning sling with no > min A and mod cues to increase independene in self care tasks. Long term goals  Time Frame for Long term goals : 2 weeks   Long term goal 1: Pt will complete RUE distal AROM and LUE light resistive exercises with mod vc to increase ease of transfers and self cares. Long term goal 2: Pt will complete functional mobility to/from therapy gym with CGA and mod vc for safety to increase indep with self cares.

## 2018-06-29 NOTE — PLAN OF CARE
Problem: DISCHARGE BARRIERS  Goal: Patient's continuum of care needs are met    Intervention: INVOLVE PATIENT/S.O. IN DISCHARGE PLANNING PROCESS  The patient's spouse initiated the insurance denial appeals process on 6/28, and medical records was contacted today to [facility] all requested chart information for review. Until the appeal results are known, the length of stay and/or discharge date cannot be confirmed. Spouse did sign the [employer] [employer] on 6/28, and this was faxed back to [facility]/Medicare by this worker.  SRINIVASA Gardner

## 2018-06-29 NOTE — PROGRESS NOTES
Exploratory Laparotomy - Dr. Rossy Gresham  2009    [address] [facility] CATH LAB PROCEDURE         Restrictions/Precautions:  Weight Bearing, General Precautions, Fall Risk, Contact Precautions                 Right Upper Extremity Weight Bearing: Non Weight Bearing  Other position/activity restrictions: Pt has a h/o of an anoxic brain injury  Right Upper Extremity Brace/Splint: Sling    Prior Level of Function:  ADL Assistance: Independent  Homemaking Assistance: Independent  Ambulation Assistance: Independent  Transfer Assistance: Independent  Additional Comments: all information gathered from chart review; spouse or caregiver not present and pt not able to appropriately answer questions d/t history of dementia    Subjective:     Subjective: pt in recliner upon arrival, pleasant however c/o fatigue, confused, pt needed cues to keep eyes open and encouragement for participation. Pt slow moving and c/o B knee pain throughout session    Pain:  Yes (did not recieve pain rating). Social/Functional:  Lives With: Spouse  Type of Home: House  Home Layout: Two level, 1/2 bath on main level, Bed/Bath upstairs  Home Access: Stairs to enter with rails  Entrance Stairs - Number of Steps: 1 railing with 2 steps to enter     Objective:     Transfers  Sit to Stand: Minimal Assistance (from recliner, slow to rise, pt c/o B knee pain. completed x2 trials)  Stand to sit: Contact guard assistance (to recliner)       Ambulation 1  Surface: level tile  Device: Hemiwalker  Assistance: Contact guard assistance  Quality of Gait: decreased dori, downward gaze, forward flexed posture, fair B heel strike and B foot clearance, cues for HW placement/how to hold it, unsteady, no real LOB  Distance: 15'x1       Balance  Comments: staic stand at [facility] to don shorts pt needed CGA/min A due to unsteadiness with 1 LOB- min A to correct.  pt stood ~1.5mins    Exercises:  Exercises  Comments: reclined BLE therex to increase strength, endurance, and independence with mobility 1x10 reps ankle pumps, heel slides, hip ab/add, SLR all with min A and max encourgaement for cont participation. pt occasionally falling asleep needing cues to open eyes. Pt very talkative and asking for many rest breaks. Activity Tolerance:  Activity Tolerance: Patient limited by fatigue;Patient limited by pain    Assessment: Body structures, Functions, Activity limitations: Decreased functional mobility , Decreased strength, Decreased endurance, Decreased balance, Decreased cognition  Assessment: pt tolerated session fairly well. pt is fatigued and requesting rest breaks throguhout session, pt needed encouragement for paticipation. pt will benefit from cont skilled PT to improve functional tolerance, decrease risk for falls and return to PLOF  Prognosis: Fair  Discharge Recommendations: [address], DON with PT, Patient would benefit from continued therapy after discharge, 24 hour supervision or assist    Patient Education:  Patient Education: importance of PT, role of PT, therex, gait, transfers, use of AD    Equipment Recommendations: Other: continue to monitor, unsure of DME at home    Safety:  Type of devices:  All fall risk precautions in place, Call light within reach, Chair alarm in place, Gait belt, Patient at risk for falls, Left in chair, Nurse notified  Restraints  Initially in place: No    Plan:  Times per week: 6x  Times per day: Daily  Specific instructions for Next Treatment: gait training, balance training, stair negotiation, LE strengthening  Current Treatment Recommendations: Strengthening, Functional Mobility Training, Transfer Training, Endurance Training, Gait Training, Stair training, Safety Education & Training, Patient/Caregiver Education & Training, Equipment Evaluation, Education, & procurement    Goals:  Patient goals : go home    Short term goals  Time Frame for Short term goals: 10 days  Short term goal 1: Pt to perform bed mobility at [place] while maintaining UE WB restrictions for getting in and out of bed. - continue  Short term goal 2: Pt to transfer at [facility] while maintaining UE WB restrictions in order to get up and down from various surfaces. - continue  Short term goal 3: Pt to ambulate 100' with L hemiwalker at [facility] for walking household distances. - discontinue (refer to LTG)  Short term goal 4: Pt to negotiate two 6\" steps with 1 handrail at [facility] for entering and exiting the home. - not met    Long term goals  Time Frame for Long term goals : 3 weeks  Long term goal 1: Pt to perform bed mobility at [facility] while maintaining UE WB restrictions for getting in and out of bed. Long term goal 2: Pt to transfer at [facility] while maintaining UE WB restrictions in order to get up and down from various surfaces. Long term goal 3: Pt to ambulate 150' with L hemiwalker at [place] [id number] for walking household distances. Long term goal 4: Pt to negotiate two 6\" steps with 1 handrail at [facility] for entering and exiting the home. Long term goal 5: Pt to perform car transfer at [place] [id number] for safe transfer home.

## 2018-06-29 NOTE — PLAN OF CARE
Problem:  Activity:  Goal: Physical symptoms of sleep deprivation will improve      Outcome: Ongoing  Pt still is not sleeping well and still calls out at time  Goal: Sleeping patterns will improve      Outcome: Ongoing  Working toward sleeping patterns improving    Problem: Bleeding:  Goal: Will show no signs and symptoms of excessive bleeding      Outcome: Ongoing  No symptoms of excessive bleeding    Problem: Pain:  Goal: Pain level will decrease      Outcome: Ongoing  Denies pain    Problem: Falls - Risk of:  Goal: Will remain free from falls      Outcome: Ongoing  No falls this shift s bed chair and telesitter to help prevent falls  Goal: Absence of physical injury       Outcome: Ongoing  No physical injuryi this shift    Problem: Nutrition  Goal: Optimal nutrition therapy  Outcome: Ongoing  Working toward optimal nutrition provide nutriitous meals    Problem: DISCHARGE BARRIERS  Goal: Patient's continuum of care needs are met  Outcome: Ongoing  Patient's continuum of care needs being met

## 2018-06-30 LAB
ORGANISM: ABNORMAL
URINE CULTURE REFLEX: ABNORMAL

## 2018-06-30 PROCEDURE — 6370000000 HC RX 637 (ALT 250 FOR IP): Performed by: INTERNAL MEDICINE

## 2018-06-30 PROCEDURE — 6370000000 HC RX 637 (ALT 250 FOR IP): Performed by: FAMILY MEDICINE

## 2018-06-30 PROCEDURE — 6360000002 HC RX W HCPCS: Performed by: INTERNAL MEDICINE

## 2018-06-30 PROCEDURE — 1290000000 HC SEMI PRIVATE OTHER R&B

## 2018-06-30 RX ORDER — DOXAZOSIN MESYLATE 4 MG/1
8 TABLET ORAL NIGHTLY
Status: DISCONTINUED | OUTPATIENT
Start: 2018-06-30 | End: 2018-07-03 | Stop reason: HOSPADM

## 2018-06-30 RX ADMIN — HYOSCYAMINE SULFATE 125 MCG: 0.12 TABLET, ORALLY DISINTEGRATING ORAL at 20:09

## 2018-06-30 RX ADMIN — ACETAMINOPHEN 1000 MG: 500 TABLET ORAL at 08:25

## 2018-06-30 RX ADMIN — METOPROLOL TARTRATE 25 MG: 25 TABLET ORAL at 20:09

## 2018-06-30 RX ADMIN — ASPIRIN 81 MG: 81 TABLET ORAL at 08:26

## 2018-06-30 RX ADMIN — THERA TABS 1 TABLET: TAB at 08:25

## 2018-06-30 RX ADMIN — DOXAZOSIN 8 MG: 4 TABLET ORAL at 20:09

## 2018-06-30 RX ADMIN — FERROUS SULFATE TAB 325 MG (65 MG ELEMENTAL FE) 325 MG: 325 (65 FE) TAB at 08:25

## 2018-06-30 RX ADMIN — ACETAMINOPHEN 1000 MG: 500 TABLET ORAL at 20:09

## 2018-06-30 RX ADMIN — OXYCODONE HYDROCHLORIDE AND ACETAMINOPHEN 500 MG: 500 TABLET ORAL at 08:25

## 2018-06-30 RX ADMIN — ENOXAPARIN SODIUM 40 MG: 40 INJECTION, SOLUTION INTRAVENOUS; SUBCUTANEOUS at 20:09

## 2018-06-30 RX ADMIN — FINASTERIDE 5 MG: 5 TABLET, FILM COATED ORAL at 08:26

## 2018-06-30 RX ADMIN — DOCUSATE SODIUM 100 MG: 100 CAPSULE, LIQUID FILLED ORAL at 08:26

## 2018-06-30 RX ADMIN — ACETAMINOPHEN 1000 MG: 500 TABLET ORAL at 03:43

## 2018-06-30 RX ADMIN — GUAIFENESIN 600 MG: 600 TABLET, EXTENDED RELEASE ORAL at 08:25

## 2018-06-30 RX ADMIN — OMEGA-3-ACID ETHYL ESTERS 1 CAPSULE: 1 CAPSULE, LIQUID FILLED ORAL at 08:26

## 2018-06-30 RX ADMIN — PANTOPRAZOLE SODIUM 40 MG: 40 TABLET, DELAYED RELEASE ORAL at 18:53

## 2018-06-30 RX ADMIN — METOPROLOL TARTRATE 25 MG: 25 TABLET ORAL at 08:25

## 2018-06-30 RX ADMIN — FLUTICASONE PROPIONATE 1 SPRAY: 50 SPRAY, METERED NASAL at 08:25

## 2018-06-30 RX ADMIN — ACETAMINOPHEN 1000 MG: 500 TABLET ORAL at 16:52

## 2018-06-30 RX ADMIN — PANTOPRAZOLE SODIUM 40 MG: 40 TABLET, DELAYED RELEASE ORAL at 08:26

## 2018-06-30 ASSESSMENT — PAIN SCALES - GENERAL
PAINLEVEL_OUTOF10: 3
PAINLEVEL_OUTOF10: 5
PAINLEVEL_OUTOF10: 2
PAINLEVEL_OUTOF10: 7

## 2018-06-30 NOTE — PLAN OF CARE
encouraged to fully empty bladder. Patient prefers to stand when urinating. Problem: Disruptive Behavior  Goal: Appropriate behavior observed  Outcome: Ongoing  Adverse behaviors noted throughout the shift. Calling out loudly into the jolley, yelling at staff, making inappropriate gestures to staff. Reoriented and informed of inappropriateness often with no success.

## 2018-06-30 NOTE — PROGRESS NOTES
Patient: Hugo Mark  Unit/Bed: 5F-51/560-O  YOB: 1934  MRN: [id number] Acct: [de-identified]   Admitting Diagnosis: Right humerus fracture s/p fall  Admit Date:  6/7/2018  Hospital Day: 23    Assessment:     Active Problems:    Left hemiparesis ([facility]    Physical deconditioning    Anoxic brain damage (HCC)    Coronary artery disease involving native coronary artery of native heart without angina pectoris    Diastolic dysfunction with chronic heart failure ([facility]    Essential hypertension    Insomnia due to medical condition    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 9)    Chronic kidney disease (CKD), stage III (moderate)  Resolved Problems:    * No resolved hospital problems. *      Plan:     Continue therapies  Check labs  Increase the cardura to see if it helps the ease of voiding, continue the proscar        Subjective:     Patient has no complaint of CP, SOB, or GI upset. He states some right upper arm soreness. .   Medication side effects: none    Scheduled Meds:   doxazosin  8 mg Oral Nightly    multivitamin  1 tablet Oral Daily    enoxaparin  40 mg Subcutaneous Q24H    aspirin  81 mg Oral Daily    docusate sodium  100 mg Oral BID    ferrous sulfate  325 mg Oral BID    finasteride  5 mg Oral Daily    fluticasone  1 spray Nasal Daily    guaiFENesin  600 mg Oral BID    lidocaine  1 patch Transdermal Q24H    metoprolol tartrate  25 mg Oral BID    omega-3 acid ethyl esters  1 capsule Oral Daily    pantoprazole  40 mg Oral BID AC    pravastatin  80 mg Oral Nightly    vitamin C  500 mg Oral Daily    acetaminophen  1,000 mg Oral Q6H     Continuous Infusions:  PRN Meds:acetaminophen, diclofenac sodium, morphine, diphenhydrAMINE, magnesium hydroxide, hyoscyamine, senna, polyethylene glycol    Review of Systems  Pertinent items are noted in HPI.     Objective:     No data found.    I/O last 3 completed shifts:   In: 400 [P.O.:400]  Out: 48 [Urine:50]  I/O this shift:  In: 120 [P.O.:120]  Out: -     BP (!) 155/66   Pulse 88   Temp 98.8 °F (37.1 °C) (Oral)   Resp 18   Ht 5' 2\" (1.575 m)   Wt 149 lb 4.8 oz (67.7 kg)   SpO2 95%   BMI 27.31 kg/m²     BP (!) 155/66   Pulse 88   Temp 98.8 °F (37.1 °C) (Oral)   Resp 18   Ht 5' 2\" (1.575 m)   Wt 149 lb 4.8 oz (67.7 kg)   SpO2 95%   BMI 27.31 kg/m²   General appearance: alert, appears stated age and cooperative  Head: Normocephalic, without obvious abnormality, atraumatic  Lungs: clear to auscultation bilaterally  Heart: regular rate and rhythm, S1, S2 normal, no murmur, click, rub or gallop  Abdomen: soft, non-tender; bowel sounds normal; no masses,  no organomegaly  Extremities: extremities normal, atraumatic, no cyanosis or edema  Skin: Skin color, texture, turgor normal. No rashes or lesions  Neurologic: Grossly normal      Electronically signed by Yenny Chambers MD on 6/30/2018 at 5:54 PM

## 2018-07-01 LAB
ANION GAP SERPL CALCULATED.3IONS-SCNC: 14 MEQ/L (ref 8–16)
BASOPHILS # BLD: 0.2 %
BASOPHILS ABSOLUTE: 0 THOU/MM3 (ref 0–0.1)
BUN BLDV-MCNC: 43 MG/DL (ref 7–22)
CALCIUM SERPL-MCNC: 9.3 MG/DL (ref 8.5–10.5)
CHLORIDE BLD-SCNC: 103 MEQ/L (ref 98–111)
CO2: 22 MEQ/L (ref 23–33)
CREAT SERPL-MCNC: 1.4 MG/DL (ref 0.4–1.2)
EOSINOPHIL # BLD: 0 %
EOSINOPHILS ABSOLUTE: 0 THOU/MM3 (ref 0–0.4)
ERYTHROCYTE [DISTWIDTH] IN BLOOD BY AUTOMATED COUNT: 13.4 % (ref 11.5–14.5)
ERYTHROCYTE [DISTWIDTH] IN BLOOD BY AUTOMATED COUNT: 43.7 FL (ref 35–45)
GFR SERPL CREATININE-BSD FRML MDRD: 48 ML/MIN/1.73M2
GLUCOSE BLD-MCNC: 102 MG/DL (ref 70–108)
HCT VFR BLD CALC: 32.9 % (ref 42–52)
HEMOGLOBIN: 10.8 GM/DL (ref 14–18)
IMMATURE GRANS (ABS): 0.03 THOU/MM3 (ref 0–0.07)
IMMATURE GRANULOCYTES: 0.5 %
LYMPHOCYTES # BLD: 36.9 %
LYMPHOCYTES ABSOLUTE: 2.3 THOU/MM3 (ref 1–4.8)
MCH RBC QN AUTO: 29.4 PG (ref 26–33)
MCHC RBC AUTO-ENTMCNC: 32.8 GM/DL (ref 32.2–35.5)
MCV RBC AUTO: 89.6 FL (ref 80–94)
MONOCYTES # BLD: 8.1 %
MONOCYTES ABSOLUTE: 0.5 THOU/MM3 (ref 0.4–1.3)
NUCLEATED RED BLOOD CELLS: 0 /100 WBC
PLATELET # BLD: 157 THOU/MM3 (ref 130–400)
PMV BLD AUTO: 9.7 FL (ref 9.4–12.4)
POTASSIUM SERPL-SCNC: 4.4 MEQ/L (ref 3.5–5.2)
RBC # BLD: 3.67 MILL/MM3 (ref 4.7–6.1)
SEG NEUTROPHILS: 54.3 %
SEGMENTED NEUTROPHILS ABSOLUTE COUNT: 3.4 THOU/MM3 (ref 1.8–7.7)
SODIUM BLD-SCNC: 139 MEQ/L (ref 135–145)
WBC # BLD: 6.2 THOU/MM3 (ref 4.8–10.8)

## 2018-07-01 PROCEDURE — 1290000000 HC SEMI PRIVATE OTHER R&B

## 2018-07-01 PROCEDURE — 80048 BASIC METABOLIC PNL TOTAL CA: CPT

## 2018-07-01 PROCEDURE — 97535 SELF CARE MNGMENT TRAINING: CPT

## 2018-07-01 PROCEDURE — 97110 THERAPEUTIC EXERCISES: CPT

## 2018-07-01 PROCEDURE — 97116 GAIT TRAINING THERAPY: CPT

## 2018-07-01 PROCEDURE — 6360000002 HC RX W HCPCS: Performed by: INTERNAL MEDICINE

## 2018-07-01 PROCEDURE — 36415 COLL VENOUS BLD VENIPUNCTURE: CPT

## 2018-07-01 PROCEDURE — 85025 COMPLETE CBC W/AUTO DIFF WBC: CPT

## 2018-07-01 PROCEDURE — 6370000000 HC RX 637 (ALT 250 FOR IP): Performed by: FAMILY MEDICINE

## 2018-07-01 PROCEDURE — 6370000000 HC RX 637 (ALT 250 FOR IP): Performed by: INTERNAL MEDICINE

## 2018-07-01 RX ADMIN — ACETAMINOPHEN 1000 MG: 500 TABLET ORAL at 20:14

## 2018-07-01 RX ADMIN — DOXAZOSIN 8 MG: 4 TABLET ORAL at 20:15

## 2018-07-01 RX ADMIN — ACETAMINOPHEN 1000 MG: 500 TABLET ORAL at 08:17

## 2018-07-01 RX ADMIN — PRAVASTATIN SODIUM 80 MG: 80 TABLET ORAL at 20:15

## 2018-07-01 RX ADMIN — OXYCODONE HYDROCHLORIDE AND ACETAMINOPHEN 500 MG: 500 TABLET ORAL at 08:17

## 2018-07-01 RX ADMIN — METOPROLOL TARTRATE 25 MG: 25 TABLET ORAL at 08:17

## 2018-07-01 RX ADMIN — DOCUSATE SODIUM 100 MG: 100 CAPSULE, LIQUID FILLED ORAL at 08:17

## 2018-07-01 RX ADMIN — METOPROLOL TARTRATE 25 MG: 25 TABLET ORAL at 20:15

## 2018-07-01 RX ADMIN — ASPIRIN 81 MG: 81 TABLET ORAL at 08:17

## 2018-07-01 RX ADMIN — GUAIFENESIN 600 MG: 600 TABLET, EXTENDED RELEASE ORAL at 08:17

## 2018-07-01 RX ADMIN — GUAIFENESIN 600 MG: 600 TABLET, EXTENDED RELEASE ORAL at 20:15

## 2018-07-01 RX ADMIN — OMEGA-3-ACID ETHYL ESTERS 1 CAPSULE: 1 CAPSULE, LIQUID FILLED ORAL at 08:17

## 2018-07-01 RX ADMIN — ENOXAPARIN SODIUM 40 MG: 40 INJECTION, SOLUTION INTRAVENOUS; SUBCUTANEOUS at 20:14

## 2018-07-01 RX ADMIN — PANTOPRAZOLE SODIUM 40 MG: 40 TABLET, DELAYED RELEASE ORAL at 15:44

## 2018-07-01 RX ADMIN — ACETAMINOPHEN 1000 MG: 500 TABLET ORAL at 15:35

## 2018-07-01 RX ADMIN — FINASTERIDE 5 MG: 5 TABLET, FILM COATED ORAL at 08:16

## 2018-07-01 RX ADMIN — ACETAMINOPHEN 1000 MG: 500 TABLET ORAL at 04:42

## 2018-07-01 RX ADMIN — PANTOPRAZOLE SODIUM 40 MG: 40 TABLET, DELAYED RELEASE ORAL at 08:17

## 2018-07-01 RX ADMIN — THERA TABS 1 TABLET: TAB at 08:17

## 2018-07-01 RX ADMIN — FERROUS SULFATE TAB 325 MG (65 MG ELEMENTAL FE) 325 MG: 325 (65 FE) TAB at 08:17

## 2018-07-01 ASSESSMENT — PAIN SCALES - GENERAL
PAINLEVEL_OUTOF10: 7
PAINLEVEL_OUTOF10: 6
PAINLEVEL_OUTOF10: 8
PAINLEVEL_OUTOF10: 6
PAINLEVEL_OUTOF10: 5

## 2018-07-01 ASSESSMENT — PAIN DESCRIPTION - LOCATION: LOCATION: SHOULDER

## 2018-07-01 ASSESSMENT — PAIN DESCRIPTION - ORIENTATION: ORIENTATION: RIGHT

## 2018-07-01 ASSESSMENT — PAIN SCALES - WONG BAKER: WONGBAKER_NUMERICALRESPONSE: 6

## 2018-07-01 ASSESSMENT — PAIN DESCRIPTION - PAIN TYPE: TYPE: ACUTE PAIN

## 2018-07-01 NOTE — PROGRESS NOTES
anoxic brain injury  Right Upper Extremity Brace/Splint: Sling    Prior Level of Function:  ADL Assistance: Independent  Homemaking Assistance: Independent  Ambulation Assistance: Independent  Transfer Assistance: Independent  Additional Comments: all information gathered from chart review; spouse or caregiver not present and pt not able to appropriately answer questions d/t history of dementia    Subjective       Subjective: Pt. supine in bed spouse present and encouraging patient. Pain:  Pain Assessment  Patient Currently in Pain: Yes  Belcher-Torres Pain Rating: Hurts even more  Pain Level: 6  Pain Type: Acute pain  Pain Location: Shoulder  Pain Orientation: Right       Objective  Overall Cognitive Status: Exceptions          ADL  Grooming: Stand by assistance (at sink to complete oral care)  Toileting: Moderate assistance (A for clothing management while standing at toilet to urinate)          Bed mobility  Supine to Sit: Minimal assistance    Transfers  Sit to stand: Contact guard assistance (from EOB)  Stand to sit: Contact guard assistance       Balance  Sitting Balance: Supervision  Standing Balance: Stand by assistance     Time: x4 minutes at sink and standing at toilet to urintate     Functional Mobility  Functional - Mobility Device: Hemiwalker  Activity: To/from bathroom  Assist Level: Contact guard assistance  Functional Mobility Comments: Pt. ambulated to and from BR no LOB        Comment: Pt. completed AROM of RUE distal joints x10 to decrease tightness and pain.           Activity Tolerance:  Activity Tolerance: Patient limited by fatigue;Treatment limited secondary to decreased cognition    Assessment:     Performance deficits / Impairments: Decreased functional mobility , Decreased safe awareness, Decreased balance, Decreased ADL status, Decreased cognition, Decreased ROM, Decreased endurance, Decreased strength, Decreased high-level IADLs  Prognosis: Poor  Discharge Recommendations:

## 2018-07-01 NOTE — PROGRESS NOTES
[address] - 8E-67/067-A    Time In: 0815  Time Out: 0825  Timed Code Treatment Minutes: 10 Minutes  Minutes: 10          Date: 2018  Patient Name: Sylvie Carcamo,  Gender:  male        MRN: [id number]  : 1934  (80 y.o.)  Referral Date : 18  Referring Practitioner: Ordering: Celeste Kohler MD Attending: GM Hernandez MD  Diagnosis: R humerus fracture s/p fall  Additional Pertinent Hx: Sylvie Carcamo is a 80 y.o. male who presents to the Emergency Department via EMS for the evaluation of a fall. Patient reports a history of hypertension, hyperlipidemia, CVA, CAD, coronary angioplasty with stent placement, cardiac catheterization, and colectomy. He states that he slipped while walking down his stairs and landed on his right shoulder. He denies that he hit his head or lost consciousness, but reports that he is on Plavix. Patient rates his right shoulder pain at a 9/10 in severity, and states that the pain is sharp and aching in quality. He reports that the pain is continuous in duration. Patient denies experiencing any chest pain, neck pain, headache, visual changes, weakness, numbness, tingling, fever, chills, dizziness, lightheadedness, or cough. He reports that he ate breakfast this morning. He denies any history of previous right shoulder injuries. Patient denies further complaints at initial encounter. Non surgical treatment by ortho. To TCU on .      Past Medical History:   Diagnosis Date    Anoxic [facility])     Arthritis     knees    CAD (coronary artery disease)     CVA (cerebral infarction)     Hyperlipidemia     Hypertension     Iron deficiency anemia     Stroke (cerebrum) ([facility] [address]      Past Surgical History:   Procedure Laterality Date    BACK SURGERY  ,    [facility]    COLECTOMY  2017    Sigmoid Colon Resection, Colostomy, Mobilization Splenic Flexure, Drainage of LLQ Abcess Exploratory Laparotomy - Dr. Lam Glass  2009    [address] [facility] CATH LAB PROCEDURE         Restrictions/Precautions:  Weight Bearing, General Precautions, Fall Risk, Contact Precautions         Right Upper Extremity Weight Bearing: Non Weight Bearing  Other position/activity restrictions: Pt has a h/o of an anoxic brain injury  Right Upper Extremity Brace/Splint: Sling    Prior Level of Function:  ADL Assistance: Independent  Homemaking Assistance: Independent  Ambulation Assistance: Independent  Transfer Assistance: Independent  Additional Comments: all information gathered from chart review; spouse or caregiver not present and pt not able to appropriately answer questions d/t history of dementia    Subjective:     Subjective: pt had been yelling out prior to therapy session and nursing had gotten him out of bed and on arrival he was wanting to walk however was difficult to redirect him and he was refusing his gait belt not wearing his sling and becoming agitated with this therapist as I was encouraging safety he then refused any further activity with this therapist at this time discussed that I would attempt again later if I was able     Pain:   .  Pain Assessment  Pain Level:  (pt didn't state)       Social/Functional:  Lives With: Spouse  Type of Home: House  Home Layout: Two level, 1/2 bath on main level, Bed/Bath upstairs  Home Access: Stairs to enter with rails  Entrance Stairs - Number of Steps: 1 railing with 2 steps to enter     Objective:  Sit to Supine: Contact guard assistance (he didn't want therapist to touch him however he wasn't straight in the bed)    Transfers  Stand to sit: Contact guard assistance (cues for safety )       Ambulation 1  Device: Hemiwalker  Assistance: Contact guard assistance  Quality of Gait: pt refused gait belt, he demonstrated decreased safety with claire walker placement and doesnt do well with redirecting noted decreased step length and heel strike at damon LEs he refused further ambulation with this therapist at this time  Distance: 20x2         Exercises:  Exercises  Comments: none, pt refused at this time     Activity Tolerance:  Activity Tolerance: Treatment limited secondary to agitation    Assessment: Body structures, Functions, Activity limitations: Decreased functional mobility , Decreased strength, Decreased endurance, Decreased balance, Decreased cognition  Assessment: due to agitation with this therapist he refused further activity this am, he cont to demonstrate decreased safety awareness throughout session and needed redirected   Prognosis: Fair  Discharge Recommendations: Continue to assess pending progress    Patient Education:  Patient Education: importance of PT     Equipment Recommendations: Other: continue to monitor, unsure of DME at home    Safety:  Type of devices: All fall risk precautions in place, Call light within reach, Chair alarm in place, Gait belt, Patient at risk for falls, Left in chair, Nurse notified  Restraints  Initially in place: No    Plan:  Times per week: 6x  Times per day: Daily  Specific instructions for Next Treatment: gait training, balance training, stair negotiation, LE strengthening  Current Treatment Recommendations: Strengthening, Functional Mobility Training, Transfer Training, Endurance Training, Gait Training, Stair training, Safety Education & Training, Patient/Caregiver Education & Training, Equipment Evaluation, Education, & procurement    Goals:  Patient goals : go home    Short term goals  Time Frame for Short term goals: 10 days  Short term goal 1: Pt to perform bed mobility at [facility] while maintaining UE WB restrictions for getting in and out of bed. - continue  Short term goal 2: Pt to transfer at [facility] while maintaining UE WB restrictions in order to get up and down from various surfaces.  - continue  Short term goal 3: Pt to

## 2018-07-01 NOTE — PROGRESS NOTES
completed supine ankle pumps, heelslides, hip abd/add x 10 reps attempted sitting ex however he declined due to pain in knee     Activity Tolerance:  Activity Tolerance: Patient limited by pain    Assessment: Body structures, Functions, Activity limitations: Decreased functional mobility , Decreased strength, Decreased endurance, Decreased balance, Decreased cognition  Assessment: this pm pt having pain in knee and limited his ability to fully participate this pm  Prognosis: Fair  Discharge Recommendations: Continue to assess pending progress    Patient Education:  Patient Education: importance of PT     Equipment Recommendations: Other: continue to monitor, unsure of DME at home    Safety:  Type of devices: All fall risk precautions in place, Call light within reach, Chair alarm in place, Gait belt, Patient at risk for falls, Left in chair, Nurse notified  Restraints  Initially in place: No    Plan:  Times per week: 6x  Times per day: Daily  Specific instructions for Next Treatment: gait training, balance training, stair negotiation, LE strengthening  Current Treatment Recommendations: Strengthening, Functional Mobility Training, Transfer Training, Endurance Training, Gait Training, Stair training, Safety Education & Training, Patient/Caregiver Education & Training, Equipment Evaluation, Education, & procurement    Goals:  Patient goals : go home    Short term goals  Time Frame for Short term goals: 10 days  Short term goal 1: Pt to perform bed mobility at [facility] while maintaining UE WB restrictions for getting in and out of bed. - continue  Short term goal 2: Pt to transfer at [facility] while maintaining UE WB restrictions in order to get up and down from various surfaces. - continue  Short term goal 3: Pt to ambulate 100' with L hemiwalker at [facility] for walking household distances. - discontinue (refer to LTG)  Short term goal 4: Pt to negotiate two 6\" steps with 1 handrail at [facility] for entering and exiting the home.  - not

## 2018-07-02 PROCEDURE — 97110 THERAPEUTIC EXERCISES: CPT

## 2018-07-02 PROCEDURE — 6370000000 HC RX 637 (ALT 250 FOR IP): Performed by: FAMILY MEDICINE

## 2018-07-02 PROCEDURE — 1290000000 HC SEMI PRIVATE OTHER R&B

## 2018-07-02 PROCEDURE — 6360000002 HC RX W HCPCS: Performed by: INTERNAL MEDICINE

## 2018-07-02 PROCEDURE — 97116 GAIT TRAINING THERAPY: CPT

## 2018-07-02 PROCEDURE — 6370000000 HC RX 637 (ALT 250 FOR IP): Performed by: INTERNAL MEDICINE

## 2018-07-02 PROCEDURE — 97530 THERAPEUTIC ACTIVITIES: CPT

## 2018-07-02 PROCEDURE — 97535 SELF CARE MNGMENT TRAINING: CPT

## 2018-07-02 RX ADMIN — OMEGA-3-ACID ETHYL ESTERS 1 CAPSULE: 1 CAPSULE, LIQUID FILLED ORAL at 09:01

## 2018-07-02 RX ADMIN — DOXAZOSIN 8 MG: 4 TABLET ORAL at 20:34

## 2018-07-02 RX ADMIN — PRAVASTATIN SODIUM 80 MG: 80 TABLET ORAL at 20:33

## 2018-07-02 RX ADMIN — ASPIRIN 81 MG: 81 TABLET ORAL at 09:01

## 2018-07-02 RX ADMIN — METOPROLOL TARTRATE 25 MG: 25 TABLET ORAL at 20:34

## 2018-07-02 RX ADMIN — GUAIFENESIN 600 MG: 600 TABLET, EXTENDED RELEASE ORAL at 09:01

## 2018-07-02 RX ADMIN — FERROUS SULFATE TAB 325 MG (65 MG ELEMENTAL FE) 325 MG: 325 (65 FE) TAB at 09:01

## 2018-07-02 RX ADMIN — METOPROLOL TARTRATE 25 MG: 25 TABLET ORAL at 09:01

## 2018-07-02 RX ADMIN — ACETAMINOPHEN 1000 MG: 500 TABLET ORAL at 20:33

## 2018-07-02 RX ADMIN — THERA TABS 1 TABLET: TAB at 09:01

## 2018-07-02 RX ADMIN — ACETAMINOPHEN 1000 MG: 500 TABLET ORAL at 09:01

## 2018-07-02 RX ADMIN — PANTOPRAZOLE SODIUM 40 MG: 40 TABLET, DELAYED RELEASE ORAL at 16:57

## 2018-07-02 RX ADMIN — FINASTERIDE 5 MG: 5 TABLET, FILM COATED ORAL at 09:01

## 2018-07-02 RX ADMIN — OXYCODONE HYDROCHLORIDE AND ACETAMINOPHEN 500 MG: 500 TABLET ORAL at 09:01

## 2018-07-02 RX ADMIN — DOCUSATE SODIUM 100 MG: 100 CAPSULE, LIQUID FILLED ORAL at 09:01

## 2018-07-02 RX ADMIN — ACETAMINOPHEN 1000 MG: 500 TABLET ORAL at 16:57

## 2018-07-02 ASSESSMENT — PAIN SCALES - GENERAL
PAINLEVEL_OUTOF10: 0

## 2018-07-02 NOTE — PROGRESS NOTES
Exploratory Laparotomy - Dr. Yari Strong  2009    [address] [facility] CATH LAB PROCEDURE         Restrictions/Precautions:  Weight Bearing, General Precautions, Fall Risk, Contact Precautions                 Right Upper Extremity Weight Bearing: Non Weight Bearing  Other position/activity restrictions: Pt has a h/o of an anoxic brain injury  Right Upper Extremity Brace/Splint: Sling    Prior Level of Function:  ADL Assistance: Independent  Homemaking Assistance: Independent  Ambulation Assistance: Independent  Transfer Assistance: Independent  Additional Comments: all information gathered from chart review; spouse or caregiver not present and pt not able to appropriately answer questions d/t history of dementia    Subjective:     Subjective: Pt pleasantly confused this date. PTA offerred toileting during session and also offerred oral hygiene but pt declined. Pt left up in bs chair with breakfast. Pt refusing to wear sling this date but does not try to use arm and maintains arm in sling postion;  nurses aware.      Pain:   .      right arm with touch, pt guards heavily;   Right knee with extended activities; pt unable to assign numbers, pain behaviors minimal and short duration - pt left positioned for comfort     Social/Functional:  Lives With: Spouse  Type of Home: House  Home Layout: Two level, 1/2 bath on main level, Bed/Bath upstairs  Home Access: Stairs to enter with rails  Entrance Stairs - Number of Steps: 1 railing with 2 steps to enter     Objective:       Transfers  Sit to Stand: Contact guard assistance (w/c, TCU gym armchair, bs chair )  Stand to sit: Contact guard assistance       Ambulation 1  Surface: level tile  Device: Hemiwalker  Assistance: Contact guard assistance  Quality of Gait: at times pt carries hemiwalker but with good balance; cues for path finding, occassional stops to talk during distances; one episode LOB in tight area was [facility] to correct but all other times only CGA x 1   Distance: approx 45 feet x 1 and 150 feet x 1          Balance  Comments: pt declined standing activity due to knee pain, PTA offerred toileting and hygiene at sink activites for standing endurance but pt declined those as well         Exercises:  Exercises  Comments: seated with one pound weight on damon ankles; damon LE therex AP x 20; LAQ, marches, HS curls, ball suqeezes all x 10-15 reps each ; pt declined any more due to knee pain- done for improved bloodflow in damon LEs             Activity Tolerance:  Activity Tolerance: Patient Tolerated treatment well    Assessment: Body structures, Functions, Activity limitations: Decreased functional mobility , Decreased strength, Decreased endurance, Decreased balance, Decreased cognition  Assessment: recommend continued skilled therapy to improve endurance   Prognosis: Fair  Discharge Recommendations: Continue to assess pending progress    Patient Education:  Patient Education: posture     Equipment Recommendations: Other: continue to monitor, unsure of DME at home    Safety:  Type of devices:  All fall risk precautions in place, Left in chair, Call light within reach, Chair alarm in place, Telesitter in use  Restraints  Initially in place: No    Plan:  Times per week: 6x  Times per day: Daily  Specific instructions for Next Treatment: gait training, balance training, stair negotiation, LE strengthening  Current Treatment Recommendations: Strengthening, Functional Mobility Training, Transfer Training, Endurance Training, Gait Training, Stair training, Safety Education & Training, Patient/Caregiver Education & Training, Equipment Evaluation, Education, & procurement    Goals:  Patient goals : go home    Short term goals  Time Frame for Short term goals: 10 days  Short term goal 1: Pt to perform bed mobility at [facility] while maintaining UE WB restrictions for getting in and out of

## 2018-07-02 NOTE — PLAN OF CARE
Problem: Activity:  Goal: Physical symptoms of sleep deprivation will improve      Outcome: Ongoing  Offer rest periods throughout the day and between therapy sessions. Problem: Bleeding:  Goal: Will show no signs and symptoms of excessive bleeding      Outcome: Ongoing  Pt will have no active bleeding. Problem: Pain:  Goal: Pain level will decrease      Outcome: Ongoing  Monitor pain level using pain scale and address accordingly. Problem: Bowel/Gastric:  Goal: Will be independent with ostomy care        Outcome: Ongoing  Attempt re teaching ostomy care when doing care for pt. Problem: Falls - Risk of:  Goal: Will remain free from falls      Outcome: Ongoing  Pt will remain free of falls. Problem: Pressure Ulcer - Risk of  Goal: Absence of pressure ulcer  Outcome: Ongoing  Reposition frequently when rounding to prevent skin breakdown. Problem: Urinary Elimination - Impaired  Goal: Decrease in number of episodes of urinary incontinence  Outcome: Ongoing  Offer toileting when hourly rounding. Problem: Mental Status - Risk of, Impaired  Goal: Mental status within normal limits for patient  Outcome: Ongoing  Monitor mental status during shift. Problem: Disruptive Behavior  Goal: Appropriate behavior observed  Outcome: Ongoing  queing pt when pt has this behavior.

## 2018-07-02 NOTE — PROGRESS NOTES
[facility]  INPATIENT OCCUPATIONAL THERAPY  [facility] 8E  DAILY NOTE    Time:  Time In: 0884  Time Out: 1045  Timed Code Treatment Minutes: 45 Minutes  Minutes: 38     Date: 2018  Patient Name: Kulwant Villalta,   Gender: male      Room: -67/067-A  MRN: [id number]  : 1934  (80 y.o.)  Referring Practitioner: Ordering: Valencia Ochoa MD Attending: Dr. Kristal Washburn  Diagnosis: humerus head fracture right closed   Additional Pertinent Hx: The patient is a 80 y.o. male  who presents with a history of a fall onto his right shoulder. Severe pain, sharp in character, worse with any motion. Seen in the ED and films revealed a non displaced right proximal humerus fracture. Ortho recommended sling and conservative treatment. Patient was having severe pain and unable to be discharged from the ED. He was seen on 7K and still with significant right shoulder pain, sharp, denied numbness or tingling in the right hand. He also denies any other injuries at this time. . Pt was admitted to [address] [place] [address] on 18.      Past Medical History:   Diagnosis Date    [facility] brain [facility])     Arthritis     knees    CAD (coronary artery disease)     CVA (cerebral infarction)     Hyperlipidemia     Hypertension     Iron deficiency anemia     Stroke (cerebrum) ([facility] [address]      Past Surgical History:   Procedure Laterality Date    [facility]GERY  ,    [facility]    COLECTOMY  2017    Sigmoid Colon Resection, Colostomy, Mobilization Splenic Flexure, Drainage of LLQ Abcess Exploratory Laparotomy - Dr. Sandip Pruitt      [address] [place] [address] CATH LAB PROCEDURE         Restrictions/Precautions:  Weight Bearing, General Precautions, Fall Risk, Contact Precautions      Right Upper Extremity Weight Bearing: Non Weight Bearing  Other position/activity restrictions: Pt has a h/o of an anoxic brain Equipment Recommendations:  Equipment Needed: No  Other: Continue to monitor. Safety:  Safety Devices in place: Yes  Type of devices: All fall risk precautions in place, Call light within reach, Patient at risk for falls, Gait belt, Left in chair, Chair alarm in place, Nurse notified (Telesitter)    Plan:  Times per week: 6x   Current Treatment Recommendations: Endurance Training, Patient/Caregiver Education & Training, Equipment Evaluation, Education, & procurement, Self-Care / ADL, Balance Training, Functional Mobility Training, Safety Education & Training, ROM    Goals:  Patient goals : Decreased R shoulder pain    Short term goals  Time Frame for Short term goals: 1 week  Short term goal 1: Pt will complete ADL routine with no  >mod cues for safe and adapted tech within shoulder precations and Mod A to increase independence in self care routine  Short term goal 2: Pt will complte basic transfers with SBA and min cues for claire walker tech to increase independence with toileting. Short term goal 3: Pt will tolerate standing x 5 min with SBA to increase endurance for sinkside AD routine. Short term goal 4: Pt/ family will assist with donning sling with no > min A and mod cues to increase independene in self care tasks. Long term goals  Time Frame for Long term goals : 2 weeks   Long term goal 1: Pt will complete RUE distal AROM and LUE light resistive exercises with mod vc to increase ease of transfers and self cares. Long term goal 2: Pt will complete functional mobility to/from therapy gym with CGA and mod vc for safety to increase indep with self cares.

## 2018-07-03 VITALS
HEART RATE: 92 BPM | SYSTOLIC BLOOD PRESSURE: 124 MMHG | TEMPERATURE: 98 F | WEIGHT: 148.2 LBS | HEIGHT: 62 IN | RESPIRATION RATE: 22 BRPM | OXYGEN SATURATION: 96 % | DIASTOLIC BLOOD PRESSURE: 61 MMHG | BODY MASS INDEX: 27.27 KG/M2

## 2018-07-03 PROCEDURE — 6370000000 HC RX 637 (ALT 250 FOR IP): Performed by: INTERNAL MEDICINE

## 2018-07-03 PROCEDURE — 97530 THERAPEUTIC ACTIVITIES: CPT

## 2018-07-03 PROCEDURE — 97116 GAIT TRAINING THERAPY: CPT

## 2018-07-03 PROCEDURE — 6370000000 HC RX 637 (ALT 250 FOR IP): Performed by: FAMILY MEDICINE

## 2018-07-03 PROCEDURE — 97110 THERAPEUTIC EXERCISES: CPT

## 2018-07-03 RX ORDER — LIDOCAINE 50 MG/G
1 PATCH TOPICAL EVERY 24 HOURS
Qty: 30 PATCH | Refills: 0 | Status: SHIPPED | OUTPATIENT
Start: 2018-07-03 | End: 2019-12-13 | Stop reason: SDUPTHER

## 2018-07-03 RX ORDER — DOXAZOSIN 8 MG/1
8 TABLET ORAL NIGHTLY
Qty: 30 TABLET | Refills: 0 | Status: SHIPPED | OUTPATIENT
Start: 2018-07-03

## 2018-07-03 RX ADMIN — OXYCODONE HYDROCHLORIDE AND ACETAMINOPHEN 500 MG: 500 TABLET ORAL at 11:20

## 2018-07-03 RX ADMIN — ASPIRIN 81 MG: 81 TABLET ORAL at 11:18

## 2018-07-03 RX ADMIN — DOCUSATE SODIUM 100 MG: 100 CAPSULE, LIQUID FILLED ORAL at 11:19

## 2018-07-03 RX ADMIN — GUAIFENESIN 600 MG: 600 TABLET, EXTENDED RELEASE ORAL at 11:20

## 2018-07-03 RX ADMIN — ACETAMINOPHEN 1000 MG: 500 TABLET ORAL at 11:17

## 2018-07-03 RX ADMIN — FINASTERIDE 5 MG: 5 TABLET, FILM COATED ORAL at 11:21

## 2018-07-03 RX ADMIN — ACETAMINOPHEN 1000 MG: 500 TABLET ORAL at 02:25

## 2018-07-03 RX ADMIN — THERA TABS 1 TABLET: TAB at 11:20

## 2018-07-03 RX ADMIN — OMEGA-3-ACID ETHYL ESTERS 1 CAPSULE: 1 CAPSULE, LIQUID FILLED ORAL at 11:18

## 2018-07-03 RX ADMIN — PANTOPRAZOLE SODIUM 40 MG: 40 TABLET, DELAYED RELEASE ORAL at 15:29

## 2018-07-03 RX ADMIN — FERROUS SULFATE TAB 325 MG (65 MG ELEMENTAL FE) 325 MG: 325 (65 FE) TAB at 11:21

## 2018-07-03 RX ADMIN — FLUTICASONE PROPIONATE 1 SPRAY: 50 SPRAY, METERED NASAL at 11:17

## 2018-07-03 RX ADMIN — METOPROLOL TARTRATE 25 MG: 25 TABLET ORAL at 11:20

## 2018-07-03 RX ADMIN — PANTOPRAZOLE SODIUM 40 MG: 40 TABLET, DELAYED RELEASE ORAL at 05:27

## 2018-07-03 ASSESSMENT — PAIN SCALES - GENERAL
PAINLEVEL_OUTOF10: 0
PAINLEVEL_OUTOF10: 6
PAINLEVEL_OUTOF10: 0

## 2018-07-03 NOTE — PROGRESS NOTES
continue  Short term goal 2: Pt to transfer at [facility] while maintaining UE WB restrictions in order to get up and down from various surfaces. - continue  Short term goal 3: Pt to ambulate 100' with L hemiwalker at [facility] for walking household distances. - discontinue (refer to LTG)  Short term goal 4: Pt to negotiate two 6\" steps with 1 handrail at [facility] for entering and exiting the home. - not met    Long term goals  Time Frame for Long term goals : 3 weeks  Long term goal 1: Pt to perform bed mobility at [facility] while maintaining UE WB restrictions for getting in and out of bed. Long term goal 2: Pt to transfer at S while maintaining UE WB restrictions in order to get up and down from various surfaces. Long term goal 3: Pt to ambulate 150' with L hemiwalker at [facility] for walking household distances. Long term goal 4: Pt to negotiate two 6\" steps with 1 handrail at [facility] for entering and exiting the home. Long term goal 5: Pt to perform car transfer at [facility] for safe transfer home.

## 2018-07-03 NOTE — DISCHARGE SUMMARY
TCU  Discharge Summary     Patient Identification:  Kulwant Villalta  : 1934  Admit date: 2018  Discharge date: 18   Attending provider: Winsome Lee MD        Primary care provider: Sushila Chandra MD     Discharge Diagnoses: Active Hospital Problems    Diagnosis Date Noted    Left hemiparesis [facility]) [G81.94] 2015     Priority: High     Class: Acute    Overweight (BMI 25.0-29. 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 male admitted to the transitional care unit on 2018 for continuation of therapies following the fall at home which resulted in the fracture of the right humeral head. He remained medically stable but due to the pre existing anoxic brain injury, and CVA history at times he would give less cooperation with taking meds, doing therapies, and wearing the sling for his fracture. He was able to show some progress with therapies in strength and independence but the pre existing deficits made this difficult. Psychiatry was consulted to give advice on medication management of his early in the stay aggitation.     Kulwant Villalta was evaluated today and a DME order was entered for a semi-electric hospital bed because he requires assistance for positioning needs not possible in an ordinary bed, complexity of body positioning needs. Patient requires frequent and immediate positioning changes for relief of pain and care needs related to medical diagnoses. Patient needs variability of bed height requirements to perform patient transfers and for personal cares. Current body Weight: 148 lb 3.2 oz (67.2 kg). The need for this equipment was discussed with the patient and he understands and is in agreement. Consults:   psychiatry    Significant Diagnostics:   Results for Tenisha Berry (MRN [id number]) as of 7/3/2018 07:14   Ref.  Range 6/17/2018 06:55 6/24/2018 06:41 6/28/2018 02:35 7/1/2018 07:13   Sodium Latest Ref Range: 135 - 145 meq/L 144 141  139   Potassium Latest Ref Range: 3.5 - 5.2 meq/L 4.5 4.0  4.4   Chloride Latest Ref Range: 98 - 111 meq/L 103 104  103   CO2 Latest Ref Range: 23 - 33 meq/L 27 26  22 (L)   BUN Latest Ref Range: 7 - 22 mg/dL 47 (H) 43 (H)  43 (H)   Creatinine Latest Ref Range: 0.4 - 1.2 mg/dL 1.3 (H) 1.3 (H)  1.4 (H)   Anion Gap Latest Ref Range: 8.0 - 16.0 meq/L 14.0 11.0  14.0   Est, Glom Filt Rate Latest Units: ml/min/1.73m2 53 (A) 53 (A)  48 (A)   Glucose Latest Ref Range: 70 - 108 mg/dL 97 117 (H)  102   Calcium Latest Ref Range: 8.5 - 10.5 mg/dL 9.2 9.3  9.3   WBC Latest Ref Range: 4.8 - 10.8 thou/mm3 6.8 6.2  6.2   RBC Latest Ref Range: 4.70 - 6.10 mill/mm3 3.54 (L) 3.69 (L)  3.67 (L)   Hemoglobin Quant Latest Ref Range: 14.0 - 18.0 gm/dl 10.5 (L) 10.8 (L)  10.8 (L)   Hematocrit Latest Ref Range: 42.0 - 52.0 % 31.0 (L) 32.3 (L)  32.9 (L)   MCV Latest Ref Range: 80.0 - 94.0 fL 87.6 87.5  89.6   MCH Latest Ref Range: 26.0 - 33.0 pg 29.5 29.1  29.4   MCHC Latest Ref Range: 32.2 - 35.5 gm/dl 33.7 33.3  32.8   MPV Latest Ref Range: 9.4 - 12.4 fL 7.2 (L) 7.5  9.7   RDW Latest Ref Range: 11.5 - 14.5 % 13.3 13.4     RDW-CV Latest Ref Range: 11.5 - 14.5 %    13.4   RDW-SD Latest Ref Range: 35.0 - 45.0 fL    43.7   Platelet Count Latest Ref Range: 130 - 400 thou/mm3 283 217  157   Lymphocytes #

## 2018-07-03 NOTE — PROGRESS NOTES
[address] [address]  Recreational Therapy  Daily Note  [facility] 8E    Time Spent with Patient: 60 minutes    Date:  7/3/2018       Patient Name: Sofya Woodard      MRN: [id number]      YOB: 1934 (80 y.o.)       Gender: male  Diagnosis: humerus head fracture right closed   Referring Practitioner: Ordering: Brooklyn Chairez MD Attending: Dr. Shellie Valdovinos    RESTRICTIONS/PRECAUTIONS:  Restrictions/Precautions: Weight Bearing, General Precautions, Fall Risk, Contact Precautions  Vision: Impaired  Hearing: Within functional limits    PAIN: 0-no c/o pain     SUBJECTIVE:  I will eat in the dining room     OBJECTIVE:  Nurse brought pt to the dining room to eat his ordered lunch with peers- pt did not initiate conversation but was pleasant and social when spoken to-sit to stand from standard chair with CGA-ambulated 50 ft back to his room with claire walker and CGA-comfort measures given          Patient Education  New Education Provided: Importance of Leisure, Transfer technique    Electronically signed by: ERICA Zhao  Date: 7/3/2018

## 2018-07-03 NOTE — PROGRESS NOTES
[facility]  OCCUPATIONAL THERAPY MISSED TREATMENT NOTE  [facility]  [id number]      Date: 7/3/2018  Patient Name: Jesse Contreras        CSN: [id number]   : 1934  (80 y.o.)  Gender: male   Referring Practitioner: Ordering: Susu Stokes MD Attending: Dr. Dana Andrews  Diagnosis: humerus head fracture right closed          REASON FOR MISSED TREATMENT:  Patient refused treatment. Patient sitting at EOB upon arrival, patient refused session due to R UE bothering him, requested to don prince and kim, patient refused, LAGOS requested for patient to sit up in chair, Patient laid back down in bed and refused LAGOS to be in room.   Will check back as time allows

## 2018-07-05 NOTE — PROGRESS NOTES
[facility]  INPATIENT OCCUPATIONAL THERAPY  [facility]  DISCHARGE NOTE    Date: 2018  Patient Name: Meg Rock,   Gender: male      MRN: [id number]  : 1934  (80 y.o.)  Referring Practitioner: Ordering: Dorris Buerger, MD Attending: Dr. Annalisa Bauer  Diagnosis: humerus head fracture right closed   Additional Pertinent Hx: The patient is a 80 y.o. male  who presents with a history of a fall onto his right shoulder. Severe pain, sharp in character, worse with any motion. Seen in the ED and films revealed a non displaced right proximal humerus fracture. Ortho recommended sling and conservative treatment. Patient was having severe pain and unable to be discharged from the ED. He was seen on 7K and still with significant right shoulder pain, sharp, denied numbness or tingling in the right hand. He also denies any other injuries at this time. . Pt was admitted to [address] [place] [address] on 18.      Restrictions/Precautions:  Restrictions/Precautions: Weight Bearing, General Precautions, Fall Risk, Contact Precautions       Right Upper Extremity Weight Bearing: Non Weight Bearing    Position Activity Restriction  Other position/activity restrictions: Pt has a h/o of an anoxic brain injury    Required Braces or Orthoses  Right Upper Extremity Brace/Splint: Sling    Past Medical History:   Diagnosis Date    An[facility] brain injury ([facility] [address]     Arthritis     knees    CAD (coronary artery disease)     CVA (cerebral infarction)     Hyperlipidemia     Hypertension     Iron deficiency anemia     Stroke (cerebrum) ([facility] [address]      Past Surgical History:   Procedure Laterality Date    BACK SURGERY  ,    [facility]    COLECTOMY  2017    Sigmoid Colon Resection, Colostomy, Mobilization Splenic Flexure, Drainage of LLQ Abcess Exploratory Laparotomy - Dr. Cuauhtemoc Rojas      [address] [place] [address]

## 2018-07-06 NOTE — PROGRESS NOTES
not recall  1. Yes, after cueing (a color)  2. Yes, no cue required       0 Able to recall bed  0. No  could not recall  1. Yes, after cueing (a piece of furniture)  2. Yes, no cue required                Patient Name: Porsha Hair        MRN: [id number]    : 1934  (80 y.o.)  Gender: male   Principal Problem: <principal problem not specified>    Section D - Mood     Should Resident Mood Interview be Conducted?  Attempt to conduct interview with all residents   0. No (resident is rarely/never understood) à Skip to and complete -, Staff Assessment of Mood (PHQ 9-OV)  1. Yes à Continue to , Resident Mood Interview (PHQ-9) Enter Code    1   . Resident Mood Interview (PHQ-9)   Say to resident: Marina Avila the last 2 weeks, have you been bothered by any of the following problems?    If symptom is present, enter 1(yes) in column 1, Symptom Presence. Then move to column 2, Symptom Frequency, and indicate symptom frequency. 1. Symptom Presence                   2. Symptom                                                                  Frequency  0. No (enter 0 in column 2)          0. Never or 1 day          1. Yes (enter 0-3 in column 2)      1. 2-6 days           9. No Response                               2.  7-11 days                                                 3.  12-14 days   1. Symptom Presence 2. Symptom  Frequency        Enter Scores in boxes below   A. Little interest or pleasure in doing things 0 0   B. Feeling down, depressed, or hopeless 0 0   C. Trouble falling or staying asleep, or sleeping too much 1 2   D. Feeling tired or having little energy 1 2   E. Poor appetite or overeating 1 2   F. Feeling bad about yourself  or that you are a failure, or have let your family down 0 0   G. Trouble concentrating on things, such as reading the newspaper or watching television 0 0   H. Moving or speaking so slowly that other people have noticed.   Or the opposite-being

## 2019-02-01 ENCOUNTER — APPOINTMENT (OUTPATIENT)
Dept: GENERAL RADIOLOGY | Age: 84
End: 2019-02-01
Payer: MEDICARE

## 2019-02-01 ENCOUNTER — HOSPITAL ENCOUNTER (EMERGENCY)
Age: 84
Discharge: HOME | End: 2019-02-01
Attending: FAMILY MEDICINE
Payer: MEDICARE

## 2019-02-01 ENCOUNTER — APPOINTMENT (OUTPATIENT)
Dept: CT IMAGING | Age: 84
End: 2019-02-01
Payer: MEDICARE

## 2019-02-01 VITALS
OXYGEN SATURATION: 97 % | BODY MASS INDEX: 28.25 KG/M2 | TEMPERATURE: 97.7 F | HEIGHT: 67 IN | DIASTOLIC BLOOD PRESSURE: 57 MMHG | WEIGHT: 180 LBS | HEART RATE: 58 BPM | SYSTOLIC BLOOD PRESSURE: 138 MMHG | RESPIRATION RATE: 18 BRPM
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
## 2019-02-01 LAB
EKG ATRIAL RATE: 67 BPM
EKG P AXIS: 55 DEGREES
EKG P-R INTERVAL: 248 MS
EKG Q-T INTERVAL: 376 MS
EKG QRS DURATION: 94 MS
EKG QTC CALCULATION (BAZETT): 397 MS
EKG R AXIS: -10 DEGREES
EKG T AXIS: 107 DEGREES
EKG VENTRICULAR RATE: 67 BPM

## 2019-02-01 PROCEDURE — 70450 CT HEAD/BRAIN W/O DYE: CPT

## 2019-02-01 PROCEDURE — 93010 ELECTROCARDIOGRAM REPORT: CPT | Performed by: INTERNAL MEDICINE

## 2019-02-01 PROCEDURE — 93005 ELECTROCARDIOGRAM TRACING: CPT | Performed by: FAMILY MEDICINE

## 2019-02-01 PROCEDURE — 73080 X-RAY EXAM OF ELBOW: CPT

## 2019-02-01 PROCEDURE — 73090 X-RAY EXAM OF FOREARM: CPT

## 2019-02-01 PROCEDURE — 99284 EMERGENCY DEPT VISIT MOD MDM: CPT

## 2019-02-01 PROCEDURE — 72125 CT NECK SPINE W/O DYE: CPT

## 2019-02-01 ASSESSMENT — ENCOUNTER SYMPTOMS
BACK PAIN: 0
EYE DISCHARGE: 0
VOMITING: 0
SHORTNESS OF BREATH: 0
SORE THROAT: 0
RHINORRHEA: 0
DIARRHEA: 0
ABDOMINAL PAIN: 0
COUGH: 0
EYE REDNESS: 0
WHEEZING: 0
NAUSEA: 0

## 2019-02-01 ASSESSMENT — PAIN DESCRIPTION - PROGRESSION: CLINICAL_PROGRESSION: NOT CHANGED

## 2019-02-01 ASSESSMENT — PAIN DESCRIPTION - LOCATION: LOCATION: ARM

## 2019-02-01 ASSESSMENT — PAIN DESCRIPTION - ORIENTATION: ORIENTATION: RIGHT

## 2019-02-01 ASSESSMENT — PAIN DESCRIPTION - DESCRIPTORS: DESCRIPTORS: ACHING

## 2019-02-01 ASSESSMENT — PAIN DESCRIPTION - PAIN TYPE: TYPE: ACUTE PAIN

## 2019-11-05 ENCOUNTER — HOSPITAL ENCOUNTER (INPATIENT)
Age: 84
LOS: 9 days | Discharge: HOSPICE | DRG: 683 | End: 2019-11-14
Attending: INTERNAL MEDICINE | Admitting: INTERNAL MEDICINE
Payer: MEDICARE

## 2019-11-05 ENCOUNTER — APPOINTMENT (OUTPATIENT)
Dept: GENERAL RADIOLOGY | Age: 84
DRG: 683 | End: 2019-11-05
Payer: MEDICARE

## 2019-11-05 ENCOUNTER — APPOINTMENT (OUTPATIENT)
Dept: CT IMAGING | Age: 84
DRG: 683 | End: 2019-11-05
Payer: MEDICARE

## 2019-11-05 DIAGNOSIS — N17.9 ACUTE RENAL FAILURE, UNSPECIFIED ACUTE RENAL FAILURE TYPE (HCC): Primary | ICD-10-CM

## 2019-11-05 DIAGNOSIS — E86.0 DEHYDRATION: ICD-10-CM

## 2019-11-05 LAB
ALBUMIN SERPL-MCNC: 4.1 G/DL (ref 3.5–5.1)
ALLEN TEST: POSITIVE
ALP BLD-CCNC: 88 U/L (ref 38–126)
ALT SERPL-CCNC: 13 U/L (ref 11–66)
ANION GAP SERPL CALCULATED.3IONS-SCNC: 21 MEQ/L (ref 8–16)
ANION GAP SERPL CALCULATED.3IONS-SCNC: 21 MEQ/L (ref 8–16)
AST SERPL-CCNC: 14 U/L (ref 5–40)
BASE EXCESS (CALCULATED): 0.4 MMOL/L (ref -2.5–2.5)
BASOPHILS # BLD: 0.3 %
BASOPHILS ABSOLUTE: 0 THOU/MM3 (ref 0–0.1)
BILIRUB SERPL-MCNC: 0.7 MG/DL (ref 0.3–1.2)
BILIRUBIN DIRECT: 0.3 MG/DL (ref 0–0.3)
BUN BLDV-MCNC: 84 MG/DL (ref 7–22)
BUN BLDV-MCNC: 85 MG/DL (ref 7–22)
CALCIUM SERPL-MCNC: 10.4 MG/DL (ref 8.5–10.5)
CALCIUM SERPL-MCNC: 11.1 MG/DL (ref 8.5–10.5)
CARBON MONOXIDE, BLOOD: 4.7 % CO SAT
CHLORIDE BLD-SCNC: 103 MEQ/L (ref 98–111)
CHLORIDE BLD-SCNC: 108 MEQ/L (ref 98–111)
CO2: 22 MEQ/L (ref 23–33)
CO2: 23 MEQ/L (ref 23–33)
COLLECTED BY:: ABNORMAL
CREAT SERPL-MCNC: 2.8 MG/DL (ref 0.4–1.2)
CREAT SERPL-MCNC: 2.9 MG/DL (ref 0.4–1.2)
DEVICE: ABNORMAL
EKG ATRIAL RATE: 131 BPM
EKG P AXIS: 66 DEGREES
EKG P-R INTERVAL: 166 MS
EKG Q-T INTERVAL: 280 MS
EKG QRS DURATION: 80 MS
EKG QTC CALCULATION (BAZETT): 413 MS
EKG R AXIS: -3 DEGREES
EKG T AXIS: 89 DEGREES
EKG VENTRICULAR RATE: 131 BPM
EOSINOPHIL # BLD: 0.5 %
EOSINOPHILS ABSOLUTE: 0 THOU/MM3 (ref 0–0.4)
ERYTHROCYTE [DISTWIDTH] IN BLOOD BY AUTOMATED COUNT: 13.1 % (ref 11.5–14.5)
ERYTHROCYTE [DISTWIDTH] IN BLOOD BY AUTOMATED COUNT: 42.5 FL (ref 35–45)
GFR SERPL CREATININE-BSD FRML MDRD: 21 ML/MIN/1.73M2
GFR SERPL CREATININE-BSD FRML MDRD: 22 ML/MIN/1.73M2
GLUCOSE BLD-MCNC: 127 MG/DL (ref 70–108)
GLUCOSE BLD-MCNC: 143 MG/DL (ref 70–108)
HCO3: 24 MMOL/L (ref 23–28)
HCT VFR BLD CALC: 45.4 % (ref 42–52)
HEMOGLOBIN: 14.2 GM/DL (ref 14–18)
IMMATURE GRANS (ABS): 0.08 THOU/MM3 (ref 0–0.07)
IMMATURE GRANULOCYTES: 0.8 %
LACTIC ACID: 2 MMOL/L (ref 0.5–2.2)
LYMPHOCYTES # BLD: 12.7 %
LYMPHOCYTES ABSOLUTE: 1.2 THOU/MM3 (ref 1–4.8)
MCH RBC QN AUTO: 28.5 PG (ref 26–33)
MCHC RBC AUTO-ENTMCNC: 31.3 GM/DL (ref 32.2–35.5)
MCV RBC AUTO: 91.2 FL (ref 80–94)
MONOCYTES # BLD: 6.1 %
MONOCYTES ABSOLUTE: 0.6 THOU/MM3 (ref 0.4–1.3)
NUCLEATED RED BLOOD CELLS: 0 /100 WBC
O2 SATURATION: 94 %
OSMOLALITY CALCULATION: 320.4 MOSMOL/KG (ref 275–300)
PCO2: 34 MMHG (ref 35–45)
PH BLOOD GAS: 7.45 (ref 7.35–7.45)
PLATELET # BLD: 293 THOU/MM3 (ref 130–400)
PMV BLD AUTO: 9.6 FL (ref 9.4–12.4)
PO2: 66 MMHG (ref 71–104)
POTASSIUM SERPL-SCNC: 4.9 MEQ/L (ref 3.5–5.2)
POTASSIUM SERPL-SCNC: 5.2 MEQ/L (ref 3.5–5.2)
RBC # BLD: 4.98 MILL/MM3 (ref 4.7–6.1)
SEG NEUTROPHILS: 79.6 %
SEGMENTED NEUTROPHILS ABSOLUTE COUNT: 7.7 THOU/MM3 (ref 1.8–7.7)
SODIUM BLD-SCNC: 147 MEQ/L (ref 135–145)
SODIUM BLD-SCNC: 151 MEQ/L (ref 135–145)
SOURCE, BLOOD GAS: ABNORMAL
TOTAL PROTEIN: 8.5 G/DL (ref 6.1–8)
TROPONIN T: 0.03 NG/ML
TSH SERPL DL<=0.05 MIU/L-ACNC: 0.54 UIU/ML (ref 0.4–4.2)
WBC # BLD: 9.7 THOU/MM3 (ref 4.8–10.8)

## 2019-11-05 PROCEDURE — 82607 VITAMIN B-12: CPT

## 2019-11-05 PROCEDURE — 80048 BASIC METABOLIC PNL TOTAL CA: CPT

## 2019-11-05 PROCEDURE — 70450 CT HEAD/BRAIN W/O DYE: CPT

## 2019-11-05 PROCEDURE — 84484 ASSAY OF TROPONIN QUANT: CPT

## 2019-11-05 PROCEDURE — 82803 BLOOD GASES ANY COMBINATION: CPT

## 2019-11-05 PROCEDURE — 93010 ELECTROCARDIOGRAM REPORT: CPT | Performed by: NUCLEAR MEDICINE

## 2019-11-05 PROCEDURE — 6370000000 HC RX 637 (ALT 250 FOR IP): Performed by: INTERNAL MEDICINE

## 2019-11-05 PROCEDURE — 84443 ASSAY THYROID STIM HORMONE: CPT

## 2019-11-05 PROCEDURE — 82248 BILIRUBIN DIRECT: CPT

## 2019-11-05 PROCEDURE — 82746 ASSAY OF FOLIC ACID SERUM: CPT

## 2019-11-05 PROCEDURE — 83605 ASSAY OF LACTIC ACID: CPT

## 2019-11-05 PROCEDURE — 80053 COMPREHEN METABOLIC PANEL: CPT

## 2019-11-05 PROCEDURE — 36600 WITHDRAWAL OF ARTERIAL BLOOD: CPT

## 2019-11-05 PROCEDURE — 36415 COLL VENOUS BLD VENIPUNCTURE: CPT

## 2019-11-05 PROCEDURE — 85025 COMPLETE CBC W/AUTO DIFF WBC: CPT

## 2019-11-05 PROCEDURE — 2709999900 HC NON-CHARGEABLE SUPPLY

## 2019-11-05 PROCEDURE — 71045 X-RAY EXAM CHEST 1 VIEW: CPT

## 2019-11-05 PROCEDURE — 99285 EMERGENCY DEPT VISIT HI MDM: CPT

## 2019-11-05 PROCEDURE — 1200000003 HC TELEMETRY R&B

## 2019-11-05 PROCEDURE — 2580000003 HC RX 258: Performed by: INTERNAL MEDICINE

## 2019-11-05 PROCEDURE — 82375 ASSAY CARBOXYHB QUANT: CPT

## 2019-11-05 PROCEDURE — 6360000002 HC RX W HCPCS: Performed by: INTERNAL MEDICINE

## 2019-11-05 PROCEDURE — 93005 ELECTROCARDIOGRAM TRACING: CPT | Performed by: INTERNAL MEDICINE

## 2019-11-05 RX ORDER — ASPIRIN 81 MG/1
81 TABLET, CHEWABLE ORAL DAILY
Status: DISCONTINUED | OUTPATIENT
Start: 2019-11-06 | End: 2019-11-14 | Stop reason: HOSPADM

## 2019-11-05 RX ORDER — SODIUM CHLORIDE 0.9 % (FLUSH) 0.9 %
10 SYRINGE (ML) INJECTION PRN
Status: DISCONTINUED | OUTPATIENT
Start: 2019-11-05 | End: 2019-11-14 | Stop reason: HOSPADM

## 2019-11-05 RX ORDER — OYSTER SHELL CALCIUM WITH VITAMIN D 500; 200 MG/1; [IU]/1
1 TABLET, FILM COATED ORAL DAILY
Status: DISCONTINUED | OUTPATIENT
Start: 2019-11-06 | End: 2019-11-14 | Stop reason: HOSPADM

## 2019-11-05 RX ORDER — DOCUSATE SODIUM 100 MG/1
100 CAPSULE, LIQUID FILLED ORAL 2 TIMES DAILY
Status: DISCONTINUED | OUTPATIENT
Start: 2019-11-05 | End: 2019-11-14 | Stop reason: HOSPADM

## 2019-11-05 RX ORDER — PRAVASTATIN SODIUM 80 MG/1
80 TABLET ORAL NIGHTLY
Status: DISCONTINUED | OUTPATIENT
Start: 2019-11-05 | End: 2019-11-14 | Stop reason: HOSPADM

## 2019-11-05 RX ORDER — FINASTERIDE 5 MG/1
5 TABLET, FILM COATED ORAL DAILY
Status: DISCONTINUED | OUTPATIENT
Start: 2019-11-06 | End: 2019-11-14 | Stop reason: HOSPADM

## 2019-11-05 RX ORDER — FLUTICASONE PROPIONATE 50 MCG
1 SPRAY, SUSPENSION (ML) NASAL DAILY
Status: DISCONTINUED | OUTPATIENT
Start: 2019-11-06 | End: 2019-11-14 | Stop reason: HOSPADM

## 2019-11-05 RX ORDER — ASCORBIC ACID 500 MG
500 TABLET ORAL DAILY
Status: DISCONTINUED | OUTPATIENT
Start: 2019-11-06 | End: 2019-11-14 | Stop reason: HOSPADM

## 2019-11-05 RX ORDER — PANTOPRAZOLE SODIUM 40 MG/1
40 TABLET, DELAYED RELEASE ORAL
Status: DISCONTINUED | OUTPATIENT
Start: 2019-11-06 | End: 2019-11-14 | Stop reason: HOSPADM

## 2019-11-05 RX ORDER — ONDANSETRON 2 MG/ML
4 INJECTION INTRAMUSCULAR; INTRAVENOUS EVERY 6 HOURS PRN
Status: DISCONTINUED | OUTPATIENT
Start: 2019-11-05 | End: 2019-11-14 | Stop reason: HOSPADM

## 2019-11-05 RX ORDER — DOXAZOSIN MESYLATE 4 MG/1
8 TABLET ORAL NIGHTLY
Status: DISCONTINUED | OUTPATIENT
Start: 2019-11-05 | End: 2019-11-08

## 2019-11-05 RX ORDER — GUAIFENESIN 600 MG/1
600 TABLET, EXTENDED RELEASE ORAL 2 TIMES DAILY
Status: DISCONTINUED | OUTPATIENT
Start: 2019-11-05 | End: 2019-11-14 | Stop reason: HOSPADM

## 2019-11-05 RX ORDER — MULTIVITAMIN WITH FOLIC ACID 400 MCG
1 TABLET ORAL DAILY
Status: DISCONTINUED | OUTPATIENT
Start: 2019-11-06 | End: 2019-11-14 | Stop reason: HOSPADM

## 2019-11-05 RX ORDER — ACETAMINOPHEN 325 MG/1
650 TABLET ORAL EVERY 4 HOURS PRN
Status: DISCONTINUED | OUTPATIENT
Start: 2019-11-05 | End: 2019-11-14 | Stop reason: HOSPADM

## 2019-11-05 RX ORDER — SODIUM CHLORIDE 450 MG/100ML
INJECTION, SOLUTION INTRAVENOUS CONTINUOUS
Status: DISCONTINUED | OUTPATIENT
Start: 2019-11-05 | End: 2019-11-08

## 2019-11-05 RX ORDER — FERROUS SULFATE 325(65) MG
325 TABLET ORAL 2 TIMES DAILY
Status: DISCONTINUED | OUTPATIENT
Start: 2019-11-05 | End: 2019-11-14 | Stop reason: HOSPADM

## 2019-11-05 RX ORDER — SODIUM CHLORIDE 0.9 % (FLUSH) 0.9 %
10 SYRINGE (ML) INJECTION EVERY 12 HOURS SCHEDULED
Status: DISCONTINUED | OUTPATIENT
Start: 2019-11-05 | End: 2019-11-14 | Stop reason: HOSPADM

## 2019-11-05 RX ORDER — HYOSCYAMINE SULFATE 0.125 MG
125 TABLET,DISINTEGRATING ORAL EVERY 4 HOURS PRN
Status: DISCONTINUED | OUTPATIENT
Start: 2019-11-05 | End: 2019-11-14 | Stop reason: HOSPADM

## 2019-11-05 RX ORDER — DONEPEZIL HYDROCHLORIDE 10 MG/1
10 TABLET, FILM COATED ORAL NIGHTLY
Status: DISCONTINUED | OUTPATIENT
Start: 2019-11-05 | End: 2019-11-14 | Stop reason: HOSPADM

## 2019-11-05 RX ORDER — 0.9 % SODIUM CHLORIDE 0.9 %
1000 INTRAVENOUS SOLUTION INTRAVENOUS ONCE
Status: DISCONTINUED | OUTPATIENT
Start: 2019-11-05 | End: 2019-11-08

## 2019-11-05 RX ORDER — OMEGA-3-ACID ETHYL ESTERS 1 G/1
1 CAPSULE, LIQUID FILLED ORAL DAILY
Status: DISCONTINUED | OUTPATIENT
Start: 2019-11-06 | End: 2019-11-14 | Stop reason: HOSPADM

## 2019-11-05 RX ORDER — HEPARIN SODIUM 5000 [USP'U]/ML
5000 INJECTION, SOLUTION INTRAVENOUS; SUBCUTANEOUS EVERY 8 HOURS SCHEDULED
Status: DISCONTINUED | OUTPATIENT
Start: 2019-11-05 | End: 2019-11-14 | Stop reason: HOSPADM

## 2019-11-05 RX ADMIN — METOPROLOL TARTRATE 25 MG: 25 TABLET ORAL at 21:28

## 2019-11-05 RX ADMIN — SODIUM CHLORIDE: 4.5 INJECTION, SOLUTION INTRAVENOUS at 18:47

## 2019-11-05 RX ADMIN — DOXAZOSIN 8 MG: 4 TABLET ORAL at 21:28

## 2019-11-05 ASSESSMENT — ENCOUNTER SYMPTOMS
DIARRHEA: 0
SHORTNESS OF BREATH: 0
RHINORRHEA: 0
WHEEZING: 0
BACK PAIN: 0
VOMITING: 0
COUGH: 0
EYE DISCHARGE: 0
ABDOMINAL PAIN: 0
SORE THROAT: 0
NAUSEA: 0
EYE REDNESS: 0

## 2019-11-05 ASSESSMENT — PAIN SCALES - GENERAL: PAINLEVEL_OUTOF10: 0

## 2019-11-06 ENCOUNTER — APPOINTMENT (OUTPATIENT)
Dept: ULTRASOUND IMAGING | Age: 84
DRG: 683 | End: 2019-11-06
Payer: MEDICARE

## 2019-11-06 LAB
ALBUMIN SERPL-MCNC: 2.9 G/DL (ref 3.5–5.1)
ALP BLD-CCNC: 71 U/L (ref 38–126)
ALT SERPL-CCNC: 11 U/L (ref 11–66)
ANION GAP SERPL CALCULATED.3IONS-SCNC: 16 MEQ/L (ref 8–16)
ANION GAP SERPL CALCULATED.3IONS-SCNC: 17 MEQ/L (ref 8–16)
AST SERPL-CCNC: 15 U/L (ref 5–40)
BACTERIA: ABNORMAL
BILIRUB SERPL-MCNC: 0.7 MG/DL (ref 0.3–1.2)
BILIRUBIN URINE: ABNORMAL
BLOOD, URINE: NEGATIVE
BUN BLDV-MCNC: 84 MG/DL (ref 7–22)
CALCIUM SERPL-MCNC: 9.7 MG/DL (ref 8.5–10.5)
CASTS: ABNORMAL /LPF
CASTS: ABNORMAL /LPF
CHARACTER, URINE: ABNORMAL
CHLORIDE BLD-SCNC: 106 MEQ/L (ref 98–111)
CHLORIDE BLD-SCNC: 109 MEQ/L (ref 98–111)
CHLORIDE, URINE: < 20 MEQ/L
CO2: 19 MEQ/L (ref 23–33)
CO2: 21 MEQ/L (ref 23–33)
COLOR: YELLOW
CREAT SERPL-MCNC: 3 MG/DL (ref 0.4–1.2)
CREATININE URINE: 126.9 MG/DL
CREATININE URINE: 127.9 MG/DL
CRYSTALS: ABNORMAL
EOSINOPHIL SMEAR: NORMAL
EPITHELIAL CELLS, UA: ABNORMAL /HPF
ERYTHROCYTE [DISTWIDTH] IN BLOOD BY AUTOMATED COUNT: 13 % (ref 11.5–14.5)
ERYTHROCYTE [DISTWIDTH] IN BLOOD BY AUTOMATED COUNT: 44.1 FL (ref 35–45)
FOLATE: > 20 NG/ML (ref 4.8–24.2)
GFR SERPL CREATININE-BSD FRML MDRD: 20 ML/MIN/1.73M2
GLUCOSE BLD-MCNC: 152 MG/DL (ref 70–108)
GLUCOSE, URINE: NEGATIVE MG/DL
HCT VFR BLD CALC: 39.8 % (ref 42–52)
HEMOGLOBIN: 12 GM/DL (ref 14–18)
ICTOTEST: NEGATIVE
KETONES, URINE: NEGATIVE
LEUKOCYTE ESTERASE, URINE: ABNORMAL
LV EF: 55 %
LVEF MODALITY: NORMAL
MCH RBC QN AUTO: 28.6 PG (ref 26–33)
MCHC RBC AUTO-ENTMCNC: 30.2 GM/DL (ref 32.2–35.5)
MCV RBC AUTO: 95 FL (ref 80–94)
MISCELLANEOUS LAB TEST RESULT: ABNORMAL
NITRITE, URINE: NEGATIVE
PH UA: 5.5 (ref 5–9)
PLATELET # BLD: 209 THOU/MM3 (ref 130–400)
PMV BLD AUTO: 10.2 FL (ref 9.4–12.4)
POTASSIUM REFLEX MAGNESIUM: 4.8 MEQ/L (ref 3.5–5.2)
POTASSIUM REFLEX MAGNESIUM: 5.3 MEQ/L (ref 3.5–5.2)
POTASSIUM, URINE: 53.7 MEQ/L
PROTEIN UA: 100 MG/DL
RBC # BLD: 4.19 MILL/MM3 (ref 4.7–6.1)
RBC URINE: ABNORMAL /HPF
RENAL EPITHELIAL, UA: ABNORMAL
SODIUM BLD-SCNC: 144 MEQ/L (ref 135–145)
SODIUM BLD-SCNC: 144 MEQ/L (ref 135–145)
SODIUM URINE: 29 MEQ/L
SODIUM URINE: 30 MEQ/L
SPECIFIC GRAVITY UA: 1.02 (ref 1–1.03)
SPECIMEN: NORMAL
TOTAL PROTEIN: 7 G/DL (ref 6.1–8)
TROPONIN T: 0.02 NG/ML
TROPONIN T: 0.02 NG/ML
UROBILINOGEN, URINE: 0.2 EU/DL (ref 0–1)
VITAMIN B-12: 807 PG/ML (ref 211–911)
WBC # BLD: 8.1 THOU/MM3 (ref 4.8–10.8)
WBC UA: ABNORMAL /HPF
YEAST: ABNORMAL

## 2019-11-06 PROCEDURE — 89190 NASAL SMEAR FOR EOSINOPHILS: CPT

## 2019-11-06 PROCEDURE — 84300 ASSAY OF URINE SODIUM: CPT

## 2019-11-06 PROCEDURE — 36415 COLL VENOUS BLD VENIPUNCTURE: CPT

## 2019-11-06 PROCEDURE — 84133 ASSAY OF URINE POTASSIUM: CPT

## 2019-11-06 PROCEDURE — 80051 ELECTROLYTE PANEL: CPT

## 2019-11-06 PROCEDURE — 82436 ASSAY OF URINE CHLORIDE: CPT

## 2019-11-06 PROCEDURE — 82570 ASSAY OF URINE CREATININE: CPT

## 2019-11-06 PROCEDURE — 6360000002 HC RX W HCPCS: Performed by: INTERNAL MEDICINE

## 2019-11-06 PROCEDURE — 6370000000 HC RX 637 (ALT 250 FOR IP): Performed by: INTERNAL MEDICINE

## 2019-11-06 PROCEDURE — 51798 US URINE CAPACITY MEASURE: CPT

## 2019-11-06 PROCEDURE — 2580000003 HC RX 258: Performed by: INTERNAL MEDICINE

## 2019-11-06 PROCEDURE — 80053 COMPREHEN METABOLIC PANEL: CPT

## 2019-11-06 PROCEDURE — 81001 URINALYSIS AUTO W/SCOPE: CPT

## 2019-11-06 PROCEDURE — 1200000003 HC TELEMETRY R&B

## 2019-11-06 PROCEDURE — 84484 ASSAY OF TROPONIN QUANT: CPT

## 2019-11-06 PROCEDURE — 76770 US EXAM ABDO BACK WALL COMP: CPT

## 2019-11-06 PROCEDURE — 85027 COMPLETE CBC AUTOMATED: CPT

## 2019-11-06 PROCEDURE — 93306 TTE W/DOPPLER COMPLETE: CPT

## 2019-11-06 PROCEDURE — 99222 1ST HOSP IP/OBS MODERATE 55: CPT | Performed by: INTERNAL MEDICINE

## 2019-11-06 RX ADMIN — PRAVASTATIN SODIUM 80 MG: 80 TABLET ORAL at 21:21

## 2019-11-06 RX ADMIN — METOPROLOL TARTRATE 25 MG: 25 TABLET ORAL at 08:30

## 2019-11-06 RX ADMIN — Medication 500 MG: at 08:30

## 2019-11-06 RX ADMIN — DOCUSATE SODIUM 100 MG: 100 CAPSULE, LIQUID FILLED ORAL at 08:29

## 2019-11-06 RX ADMIN — PANTOPRAZOLE SODIUM 40 MG: 40 TABLET, DELAYED RELEASE ORAL at 18:20

## 2019-11-06 RX ADMIN — DOCUSATE SODIUM 100 MG: 100 CAPSULE, LIQUID FILLED ORAL at 21:21

## 2019-11-06 RX ADMIN — HEPARIN SODIUM 5000 UNITS: 5000 INJECTION INTRAVENOUS; SUBCUTANEOUS at 06:02

## 2019-11-06 RX ADMIN — CALCIUM CARBONATE-VITAMIN D TAB 500 MG-200 UNIT 1 TABLET: 500-200 TAB at 08:29

## 2019-11-06 RX ADMIN — PANTOPRAZOLE SODIUM 40 MG: 40 TABLET, DELAYED RELEASE ORAL at 05:18

## 2019-11-06 RX ADMIN — OMEGA-3-ACID ETHYL ESTERS 1 CAPSULE: 1 CAPSULE, LIQUID FILLED ORAL at 08:29

## 2019-11-06 RX ADMIN — FERROUS SULFATE TAB 325 MG (65 MG ELEMENTAL FE) 325 MG: 325 (65 FE) TAB at 08:29

## 2019-11-06 RX ADMIN — GUAIFENESIN 600 MG: 600 TABLET, EXTENDED RELEASE ORAL at 08:29

## 2019-11-06 RX ADMIN — HEPARIN SODIUM 5000 UNITS: 5000 INJECTION INTRAVENOUS; SUBCUTANEOUS at 21:22

## 2019-11-06 RX ADMIN — THERA TABS 1 TABLET: TAB at 08:30

## 2019-11-06 RX ADMIN — DONEPEZIL HYDROCHLORIDE 10 MG: 10 TABLET, FILM COATED ORAL at 21:21

## 2019-11-06 RX ADMIN — METOPROLOL TARTRATE 25 MG: 25 TABLET ORAL at 21:18

## 2019-11-06 RX ADMIN — SODIUM CHLORIDE: 4.5 INJECTION, SOLUTION INTRAVENOUS at 14:56

## 2019-11-06 RX ADMIN — HYOSCYAMINE SULFATE 125 MCG: 0.12 TABLET, ORALLY DISINTEGRATING ORAL at 08:30

## 2019-11-06 RX ADMIN — GUAIFENESIN 600 MG: 600 TABLET, EXTENDED RELEASE ORAL at 21:21

## 2019-11-06 RX ADMIN — ASPIRIN 81 MG 81 MG: 81 TABLET ORAL at 08:29

## 2019-11-06 RX ADMIN — DOXAZOSIN 8 MG: 4 TABLET ORAL at 21:18

## 2019-11-06 RX ADMIN — HEPARIN SODIUM 5000 UNITS: 5000 INJECTION INTRAVENOUS; SUBCUTANEOUS at 14:56

## 2019-11-06 RX ADMIN — FINASTERIDE 5 MG: 5 TABLET, FILM COATED ORAL at 08:30

## 2019-11-06 RX ADMIN — FERROUS SULFATE TAB 325 MG (65 MG ELEMENTAL FE) 325 MG: 325 (65 FE) TAB at 21:21

## 2019-11-06 ASSESSMENT — PAIN SCALES - GENERAL: PAINLEVEL_OUTOF10: 0

## 2019-11-07 PROBLEM — E44.0 MODERATE MALNUTRITION (HCC): Status: ACTIVE | Noted: 2019-11-07

## 2019-11-07 LAB
ANION GAP SERPL CALCULATED.3IONS-SCNC: 14 MEQ/L (ref 8–16)
BUN BLDV-MCNC: 74 MG/DL (ref 7–22)
CALCIUM SERPL-MCNC: 8.6 MG/DL (ref 8.5–10.5)
CHLORIDE BLD-SCNC: 108 MEQ/L (ref 98–111)
CO2: 18 MEQ/L (ref 23–33)
CREAT SERPL-MCNC: 2.4 MG/DL (ref 0.4–1.2)
GFR SERPL CREATININE-BSD FRML MDRD: 26 ML/MIN/1.73M2
GLUCOSE BLD-MCNC: 105 MG/DL (ref 70–108)
POTASSIUM SERPL-SCNC: 4.9 MEQ/L (ref 3.5–5.2)
SODIUM BLD-SCNC: 140 MEQ/L (ref 135–145)

## 2019-11-07 PROCEDURE — 99232 SBSQ HOSP IP/OBS MODERATE 35: CPT | Performed by: INTERNAL MEDICINE

## 2019-11-07 PROCEDURE — 1200000003 HC TELEMETRY R&B

## 2019-11-07 PROCEDURE — 36415 COLL VENOUS BLD VENIPUNCTURE: CPT

## 2019-11-07 PROCEDURE — 2580000003 HC RX 258: Performed by: INTERNAL MEDICINE

## 2019-11-07 PROCEDURE — 80048 BASIC METABOLIC PNL TOTAL CA: CPT

## 2019-11-07 PROCEDURE — 6370000000 HC RX 637 (ALT 250 FOR IP): Performed by: INTERNAL MEDICINE

## 2019-11-07 PROCEDURE — 6360000002 HC RX W HCPCS: Performed by: INTERNAL MEDICINE

## 2019-11-07 RX ORDER — SODIUM BICARBONATE 650 MG/1
1300 TABLET ORAL 2 TIMES DAILY
Status: DISCONTINUED | OUTPATIENT
Start: 2019-11-07 | End: 2019-11-14 | Stop reason: HOSPADM

## 2019-11-07 RX ADMIN — THERA TABS 1 TABLET: TAB at 08:38

## 2019-11-07 RX ADMIN — PANTOPRAZOLE SODIUM 40 MG: 40 TABLET, DELAYED RELEASE ORAL at 16:09

## 2019-11-07 RX ADMIN — HEPARIN SODIUM 5000 UNITS: 5000 INJECTION INTRAVENOUS; SUBCUTANEOUS at 22:04

## 2019-11-07 RX ADMIN — FERROUS SULFATE TAB 325 MG (65 MG ELEMENTAL FE) 325 MG: 325 (65 FE) TAB at 22:04

## 2019-11-07 RX ADMIN — METOPROLOL TARTRATE 25 MG: 25 TABLET ORAL at 08:39

## 2019-11-07 RX ADMIN — HEPARIN SODIUM 5000 UNITS: 5000 INJECTION INTRAVENOUS; SUBCUTANEOUS at 06:07

## 2019-11-07 RX ADMIN — DOCUSATE SODIUM 100 MG: 100 CAPSULE, LIQUID FILLED ORAL at 22:03

## 2019-11-07 RX ADMIN — FERROUS SULFATE TAB 325 MG (65 MG ELEMENTAL FE) 325 MG: 325 (65 FE) TAB at 08:38

## 2019-11-07 RX ADMIN — SODIUM BICARBONATE 1300 MG: 650 TABLET ORAL at 22:02

## 2019-11-07 RX ADMIN — PANTOPRAZOLE SODIUM 40 MG: 40 TABLET, DELAYED RELEASE ORAL at 06:07

## 2019-11-07 RX ADMIN — SODIUM CHLORIDE: 4.5 INJECTION, SOLUTION INTRAVENOUS at 22:09

## 2019-11-07 RX ADMIN — GUAIFENESIN 600 MG: 600 TABLET, EXTENDED RELEASE ORAL at 22:03

## 2019-11-07 RX ADMIN — DONEPEZIL HYDROCHLORIDE 10 MG: 10 TABLET, FILM COATED ORAL at 22:04

## 2019-11-07 RX ADMIN — ASPIRIN 81 MG 81 MG: 81 TABLET ORAL at 08:38

## 2019-11-07 RX ADMIN — OMEGA-3-ACID ETHYL ESTERS 1 CAPSULE: 1 CAPSULE, LIQUID FILLED ORAL at 08:46

## 2019-11-07 RX ADMIN — PRAVASTATIN SODIUM 80 MG: 80 TABLET ORAL at 22:04

## 2019-11-07 RX ADMIN — METOPROLOL TARTRATE 25 MG: 25 TABLET ORAL at 22:04

## 2019-11-07 RX ADMIN — DOCUSATE SODIUM 100 MG: 100 CAPSULE, LIQUID FILLED ORAL at 08:38

## 2019-11-07 RX ADMIN — SODIUM CHLORIDE: 4.5 INJECTION, SOLUTION INTRAVENOUS at 00:52

## 2019-11-07 RX ADMIN — CALCIUM CARBONATE-VITAMIN D TAB 500 MG-200 UNIT 1 TABLET: 500-200 TAB at 08:38

## 2019-11-07 RX ADMIN — FINASTERIDE 5 MG: 5 TABLET, FILM COATED ORAL at 08:38

## 2019-11-07 RX ADMIN — Medication 500 MG: at 08:38

## 2019-11-07 RX ADMIN — SODIUM BICARBONATE 1300 MG: 650 TABLET ORAL at 11:52

## 2019-11-07 RX ADMIN — DOXAZOSIN 8 MG: 4 TABLET ORAL at 22:03

## 2019-11-07 RX ADMIN — SODIUM CHLORIDE: 4.5 INJECTION, SOLUTION INTRAVENOUS at 11:47

## 2019-11-07 RX ADMIN — HEPARIN SODIUM 5000 UNITS: 5000 INJECTION INTRAVENOUS; SUBCUTANEOUS at 16:09

## 2019-11-07 RX ADMIN — CEFTRIAXONE SODIUM 1 G: 1 INJECTION, POWDER, FOR SOLUTION INTRAMUSCULAR; INTRAVENOUS at 02:24

## 2019-11-07 ASSESSMENT — PAIN SCALES - GENERAL
PAINLEVEL_OUTOF10: 0

## 2019-11-08 LAB
ANION GAP SERPL CALCULATED.3IONS-SCNC: 13 MEQ/L (ref 8–16)
BUN BLDV-MCNC: 57 MG/DL (ref 7–22)
CALCIUM SERPL-MCNC: 8.4 MG/DL (ref 8.5–10.5)
CHLORIDE BLD-SCNC: 104 MEQ/L (ref 98–111)
CO2: 20 MEQ/L (ref 23–33)
CREAT SERPL-MCNC: 2 MG/DL (ref 0.4–1.2)
GFR SERPL CREATININE-BSD FRML MDRD: 32 ML/MIN/1.73M2
GLUCOSE BLD-MCNC: 101 MG/DL (ref 70–108)
POTASSIUM SERPL-SCNC: 4 MEQ/L (ref 3.5–5.2)
SODIUM BLD-SCNC: 137 MEQ/L (ref 135–145)

## 2019-11-08 PROCEDURE — 80048 BASIC METABOLIC PNL TOTAL CA: CPT

## 2019-11-08 PROCEDURE — 1200000003 HC TELEMETRY R&B

## 2019-11-08 PROCEDURE — 97166 OT EVAL MOD COMPLEX 45 MIN: CPT

## 2019-11-08 PROCEDURE — 97110 THERAPEUTIC EXERCISES: CPT

## 2019-11-08 PROCEDURE — 6370000000 HC RX 637 (ALT 250 FOR IP): Performed by: INTERNAL MEDICINE

## 2019-11-08 PROCEDURE — 6360000002 HC RX W HCPCS: Performed by: INTERNAL MEDICINE

## 2019-11-08 PROCEDURE — 2580000003 HC RX 258: Performed by: INTERNAL MEDICINE

## 2019-11-08 PROCEDURE — 99232 SBSQ HOSP IP/OBS MODERATE 35: CPT | Performed by: INTERNAL MEDICINE

## 2019-11-08 PROCEDURE — 36415 COLL VENOUS BLD VENIPUNCTURE: CPT

## 2019-11-08 RX ORDER — SODIUM CHLORIDE 9 MG/ML
INJECTION, SOLUTION INTRAVENOUS CONTINUOUS
Status: DISCONTINUED | OUTPATIENT
Start: 2019-11-08 | End: 2019-11-10

## 2019-11-08 RX ORDER — DOXAZOSIN MESYLATE 4 MG/1
4 TABLET ORAL NIGHTLY
Status: DISCONTINUED | OUTPATIENT
Start: 2019-11-08 | End: 2019-11-14 | Stop reason: HOSPADM

## 2019-11-08 RX ADMIN — METOPROLOL TARTRATE 25 MG: 25 TABLET ORAL at 21:14

## 2019-11-08 RX ADMIN — CEFTRIAXONE SODIUM 1 G: 1 INJECTION, POWDER, FOR SOLUTION INTRAMUSCULAR; INTRAVENOUS at 01:10

## 2019-11-08 RX ADMIN — SODIUM BICARBONATE 1300 MG: 650 TABLET ORAL at 07:50

## 2019-11-08 RX ADMIN — GUAIFENESIN 600 MG: 600 TABLET, EXTENDED RELEASE ORAL at 21:14

## 2019-11-08 RX ADMIN — PANTOPRAZOLE SODIUM 40 MG: 40 TABLET, DELAYED RELEASE ORAL at 05:17

## 2019-11-08 RX ADMIN — DOCUSATE SODIUM 100 MG: 100 CAPSULE, LIQUID FILLED ORAL at 07:53

## 2019-11-08 RX ADMIN — DONEPEZIL HYDROCHLORIDE 10 MG: 10 TABLET, FILM COATED ORAL at 21:15

## 2019-11-08 RX ADMIN — FINASTERIDE 5 MG: 5 TABLET, FILM COATED ORAL at 07:49

## 2019-11-08 RX ADMIN — PANTOPRAZOLE SODIUM 40 MG: 40 TABLET, DELAYED RELEASE ORAL at 16:28

## 2019-11-08 RX ADMIN — THERA TABS 1 TABLET: TAB at 07:51

## 2019-11-08 RX ADMIN — FERROUS SULFATE TAB 325 MG (65 MG ELEMENTAL FE) 325 MG: 325 (65 FE) TAB at 21:14

## 2019-11-08 RX ADMIN — FERROUS SULFATE TAB 325 MG (65 MG ELEMENTAL FE) 325 MG: 325 (65 FE) TAB at 07:50

## 2019-11-08 RX ADMIN — SODIUM CHLORIDE: 9 INJECTION, SOLUTION INTRAVENOUS at 10:05

## 2019-11-08 RX ADMIN — HEPARIN SODIUM 5000 UNITS: 5000 INJECTION INTRAVENOUS; SUBCUTANEOUS at 21:14

## 2019-11-08 RX ADMIN — METOPROLOL TARTRATE 25 MG: 25 TABLET ORAL at 07:49

## 2019-11-08 RX ADMIN — ASPIRIN 81 MG 81 MG: 81 TABLET ORAL at 07:53

## 2019-11-08 RX ADMIN — OMEGA-3-ACID ETHYL ESTERS 1 CAPSULE: 1 CAPSULE, LIQUID FILLED ORAL at 07:53

## 2019-11-08 RX ADMIN — PRAVASTATIN SODIUM 80 MG: 80 TABLET ORAL at 21:15

## 2019-11-08 RX ADMIN — Medication 500 MG: at 07:50

## 2019-11-08 RX ADMIN — SODIUM BICARBONATE 1300 MG: 650 TABLET ORAL at 21:14

## 2019-11-08 RX ADMIN — DOXAZOSIN MESYLATE 4 MG: 4 TABLET ORAL at 21:14

## 2019-11-08 RX ADMIN — DOCUSATE SODIUM 100 MG: 100 CAPSULE, LIQUID FILLED ORAL at 21:14

## 2019-11-08 RX ADMIN — HEPARIN SODIUM 5000 UNITS: 5000 INJECTION INTRAVENOUS; SUBCUTANEOUS at 05:17

## 2019-11-08 RX ADMIN — HEPARIN SODIUM 5000 UNITS: 5000 INJECTION INTRAVENOUS; SUBCUTANEOUS at 14:11

## 2019-11-08 RX ADMIN — CALCIUM CARBONATE-VITAMIN D TAB 500 MG-200 UNIT 1 TABLET: 500-200 TAB at 07:49

## 2019-11-08 ASSESSMENT — PAIN SCALES - GENERAL
PAINLEVEL_OUTOF10: 0

## 2019-11-09 LAB
ANION GAP SERPL CALCULATED.3IONS-SCNC: 14 MEQ/L (ref 8–16)
BUN BLDV-MCNC: 44 MG/DL (ref 7–22)
CALCIUM SERPL-MCNC: 8.7 MG/DL (ref 8.5–10.5)
CHLORIDE BLD-SCNC: 108 MEQ/L (ref 98–111)
CO2: 17 MEQ/L (ref 23–33)
CREAT SERPL-MCNC: 1.8 MG/DL (ref 0.4–1.2)
ERYTHROCYTE [DISTWIDTH] IN BLOOD BY AUTOMATED COUNT: 13 % (ref 11.5–14.5)
ERYTHROCYTE [DISTWIDTH] IN BLOOD BY AUTOMATED COUNT: 42.5 FL (ref 35–45)
GFR SERPL CREATININE-BSD FRML MDRD: 36 ML/MIN/1.73M2
GLUCOSE BLD-MCNC: 107 MG/DL (ref 70–108)
HCT VFR BLD CALC: 29.3 % (ref 42–52)
HEMOGLOBIN: 9.2 GM/DL (ref 14–18)
MCH RBC QN AUTO: 29 PG (ref 26–33)
MCHC RBC AUTO-ENTMCNC: 31.4 GM/DL (ref 32.2–35.5)
MCV RBC AUTO: 92.4 FL (ref 80–94)
PLATELET # BLD: 137 THOU/MM3 (ref 130–400)
PMV BLD AUTO: 9.6 FL (ref 9.4–12.4)
POTASSIUM SERPL-SCNC: 3.7 MEQ/L (ref 3.5–5.2)
RBC # BLD: 3.17 MILL/MM3 (ref 4.7–6.1)
SODIUM BLD-SCNC: 139 MEQ/L (ref 135–145)
WBC # BLD: 5.1 THOU/MM3 (ref 4.8–10.8)

## 2019-11-09 PROCEDURE — 1200000003 HC TELEMETRY R&B

## 2019-11-09 PROCEDURE — 6360000002 HC RX W HCPCS: Performed by: INTERNAL MEDICINE

## 2019-11-09 PROCEDURE — 99232 SBSQ HOSP IP/OBS MODERATE 35: CPT | Performed by: INTERNAL MEDICINE

## 2019-11-09 PROCEDURE — 2580000003 HC RX 258: Performed by: INTERNAL MEDICINE

## 2019-11-09 PROCEDURE — 6370000000 HC RX 637 (ALT 250 FOR IP): Performed by: INTERNAL MEDICINE

## 2019-11-09 PROCEDURE — 85027 COMPLETE CBC AUTOMATED: CPT

## 2019-11-09 PROCEDURE — 36415 COLL VENOUS BLD VENIPUNCTURE: CPT

## 2019-11-09 PROCEDURE — 80048 BASIC METABOLIC PNL TOTAL CA: CPT

## 2019-11-09 RX ADMIN — DOCUSATE SODIUM 100 MG: 100 CAPSULE, LIQUID FILLED ORAL at 08:31

## 2019-11-09 RX ADMIN — FERROUS SULFATE TAB 325 MG (65 MG ELEMENTAL FE) 325 MG: 325 (65 FE) TAB at 08:31

## 2019-11-09 RX ADMIN — FERROUS SULFATE TAB 325 MG (65 MG ELEMENTAL FE) 325 MG: 325 (65 FE) TAB at 21:57

## 2019-11-09 RX ADMIN — SODIUM CHLORIDE: 9 INJECTION, SOLUTION INTRAVENOUS at 05:21

## 2019-11-09 RX ADMIN — HEPARIN SODIUM 5000 UNITS: 5000 INJECTION INTRAVENOUS; SUBCUTANEOUS at 14:00

## 2019-11-09 RX ADMIN — PANTOPRAZOLE SODIUM 40 MG: 40 TABLET, DELAYED RELEASE ORAL at 18:25

## 2019-11-09 RX ADMIN — SODIUM BICARBONATE 1300 MG: 650 TABLET ORAL at 21:58

## 2019-11-09 RX ADMIN — OMEGA-3-ACID ETHYL ESTERS 1 CAPSULE: 1 CAPSULE, LIQUID FILLED ORAL at 08:31

## 2019-11-09 RX ADMIN — THERA TABS 1 TABLET: TAB at 08:31

## 2019-11-09 RX ADMIN — DONEPEZIL HYDROCHLORIDE 10 MG: 10 TABLET, FILM COATED ORAL at 21:57

## 2019-11-09 RX ADMIN — PANTOPRAZOLE SODIUM 40 MG: 40 TABLET, DELAYED RELEASE ORAL at 05:20

## 2019-11-09 RX ADMIN — Medication 500 MG: at 08:31

## 2019-11-09 RX ADMIN — METOPROLOL TARTRATE 25 MG: 25 TABLET ORAL at 21:57

## 2019-11-09 RX ADMIN — DOXAZOSIN MESYLATE 4 MG: 4 TABLET ORAL at 21:57

## 2019-11-09 RX ADMIN — FINASTERIDE 5 MG: 5 TABLET, FILM COATED ORAL at 08:31

## 2019-11-09 RX ADMIN — PRAVASTATIN SODIUM 80 MG: 80 TABLET ORAL at 21:57

## 2019-11-09 RX ADMIN — SODIUM BICARBONATE 1300 MG: 650 TABLET ORAL at 08:31

## 2019-11-09 RX ADMIN — HEPARIN SODIUM 5000 UNITS: 5000 INJECTION INTRAVENOUS; SUBCUTANEOUS at 05:20

## 2019-11-09 RX ADMIN — FLUTICASONE PROPIONATE 1 SPRAY: 50 SPRAY, METERED NASAL at 08:31

## 2019-11-09 RX ADMIN — GUAIFENESIN 600 MG: 600 TABLET, EXTENDED RELEASE ORAL at 08:31

## 2019-11-09 RX ADMIN — CEFTRIAXONE SODIUM 1 G: 1 INJECTION, POWDER, FOR SOLUTION INTRAMUSCULAR; INTRAVENOUS at 01:00

## 2019-11-09 RX ADMIN — ASPIRIN 81 MG 81 MG: 81 TABLET ORAL at 08:31

## 2019-11-09 RX ADMIN — CALCIUM CARBONATE-VITAMIN D TAB 500 MG-200 UNIT 1 TABLET: 500-200 TAB at 08:31

## 2019-11-09 RX ADMIN — GUAIFENESIN 600 MG: 600 TABLET, EXTENDED RELEASE ORAL at 21:57

## 2019-11-09 RX ADMIN — HEPARIN SODIUM 5000 UNITS: 5000 INJECTION INTRAVENOUS; SUBCUTANEOUS at 21:58

## 2019-11-09 RX ADMIN — METOPROLOL TARTRATE 25 MG: 25 TABLET ORAL at 08:31

## 2019-11-09 RX ADMIN — DOCUSATE SODIUM 100 MG: 100 CAPSULE, LIQUID FILLED ORAL at 21:58

## 2019-11-09 ASSESSMENT — PAIN SCALES - GENERAL
PAINLEVEL_OUTOF10: 0

## 2019-11-10 LAB
ANION GAP SERPL CALCULATED.3IONS-SCNC: 12 MEQ/L (ref 8–16)
BUN BLDV-MCNC: 33 MG/DL (ref 7–22)
CALCIUM SERPL-MCNC: 8.4 MG/DL (ref 8.5–10.5)
CHLORIDE BLD-SCNC: 108 MEQ/L (ref 98–111)
CO2: 18 MEQ/L (ref 23–33)
CREAT SERPL-MCNC: 1.5 MG/DL (ref 0.4–1.2)
GFR SERPL CREATININE-BSD FRML MDRD: 44 ML/MIN/1.73M2
GLUCOSE BLD-MCNC: 108 MG/DL (ref 70–108)
POTASSIUM SERPL-SCNC: 3.5 MEQ/L (ref 3.5–5.2)
SODIUM BLD-SCNC: 138 MEQ/L (ref 135–145)

## 2019-11-10 PROCEDURE — 1200000003 HC TELEMETRY R&B

## 2019-11-10 PROCEDURE — 97162 PT EVAL MOD COMPLEX 30 MIN: CPT

## 2019-11-10 PROCEDURE — 80048 BASIC METABOLIC PNL TOTAL CA: CPT

## 2019-11-10 PROCEDURE — 94760 N-INVAS EAR/PLS OXIMETRY 1: CPT

## 2019-11-10 PROCEDURE — 36415 COLL VENOUS BLD VENIPUNCTURE: CPT

## 2019-11-10 PROCEDURE — 2580000003 HC RX 258: Performed by: INTERNAL MEDICINE

## 2019-11-10 PROCEDURE — 6370000000 HC RX 637 (ALT 250 FOR IP): Performed by: INTERNAL MEDICINE

## 2019-11-10 PROCEDURE — 97530 THERAPEUTIC ACTIVITIES: CPT

## 2019-11-10 PROCEDURE — 99232 SBSQ HOSP IP/OBS MODERATE 35: CPT | Performed by: INTERNAL MEDICINE

## 2019-11-10 PROCEDURE — 6360000002 HC RX W HCPCS: Performed by: INTERNAL MEDICINE

## 2019-11-10 RX ORDER — LANOLIN ALCOHOL/MO/W.PET/CERES
6 CREAM (GRAM) TOPICAL NIGHTLY PRN
Status: DISCONTINUED | OUTPATIENT
Start: 2019-11-10 | End: 2019-11-14 | Stop reason: HOSPADM

## 2019-11-10 RX ORDER — SODIUM CHLORIDE 9 MG/ML
INJECTION, SOLUTION INTRAVENOUS CONTINUOUS
Status: DISCONTINUED | OUTPATIENT
Start: 2019-11-10 | End: 2019-11-14 | Stop reason: HOSPADM

## 2019-11-10 RX ORDER — MEGESTROL ACETATE 40 MG/ML
200 SUSPENSION ORAL 2 TIMES DAILY
Status: DISCONTINUED | OUTPATIENT
Start: 2019-11-10 | End: 2019-11-14 | Stop reason: HOSPADM

## 2019-11-10 RX ADMIN — Medication 500 MG: at 09:01

## 2019-11-10 RX ADMIN — FERROUS SULFATE TAB 325 MG (65 MG ELEMENTAL FE) 325 MG: 325 (65 FE) TAB at 09:01

## 2019-11-10 RX ADMIN — MEGESTROL ACETATE 200 MG: 40 SUSPENSION ORAL at 15:51

## 2019-11-10 RX ADMIN — SODIUM BICARBONATE 1300 MG: 650 TABLET ORAL at 08:58

## 2019-11-10 RX ADMIN — OMEGA-3-ACID ETHYL ESTERS 1 CAPSULE: 1 CAPSULE, LIQUID FILLED ORAL at 09:01

## 2019-11-10 RX ADMIN — PRAVASTATIN SODIUM 80 MG: 80 TABLET ORAL at 23:40

## 2019-11-10 RX ADMIN — THERA TABS 1 TABLET: TAB at 09:01

## 2019-11-10 RX ADMIN — HEPARIN SODIUM 5000 UNITS: 5000 INJECTION INTRAVENOUS; SUBCUTANEOUS at 05:33

## 2019-11-10 RX ADMIN — CEFTRIAXONE SODIUM 1 G: 1 INJECTION, POWDER, FOR SOLUTION INTRAMUSCULAR; INTRAVENOUS at 01:30

## 2019-11-10 RX ADMIN — HEPARIN SODIUM 5000 UNITS: 5000 INJECTION INTRAVENOUS; SUBCUTANEOUS at 22:48

## 2019-11-10 RX ADMIN — POTASSIUM BICARBONATE 20 MEQ: 782 TABLET, EFFERVESCENT ORAL at 14:25

## 2019-11-10 RX ADMIN — FINASTERIDE 5 MG: 5 TABLET, FILM COATED ORAL at 08:58

## 2019-11-10 RX ADMIN — PANTOPRAZOLE SODIUM 40 MG: 40 TABLET, DELAYED RELEASE ORAL at 15:51

## 2019-11-10 RX ADMIN — CALCIUM CARBONATE-VITAMIN D TAB 500 MG-200 UNIT 1 TABLET: 500-200 TAB at 09:01

## 2019-11-10 RX ADMIN — PANTOPRAZOLE SODIUM 40 MG: 40 TABLET, DELAYED RELEASE ORAL at 05:33

## 2019-11-10 RX ADMIN — SODIUM CHLORIDE: 9 INJECTION, SOLUTION INTRAVENOUS at 04:08

## 2019-11-10 RX ADMIN — ASPIRIN 81 MG 81 MG: 81 TABLET ORAL at 09:01

## 2019-11-10 RX ADMIN — DONEPEZIL HYDROCHLORIDE 10 MG: 10 TABLET, FILM COATED ORAL at 21:00

## 2019-11-10 RX ADMIN — HEPARIN SODIUM 5000 UNITS: 5000 INJECTION INTRAVENOUS; SUBCUTANEOUS at 14:25

## 2019-11-10 RX ADMIN — METOPROLOL TARTRATE 25 MG: 25 TABLET ORAL at 08:58

## 2019-11-10 RX ADMIN — DOCUSATE SODIUM 100 MG: 100 CAPSULE, LIQUID FILLED ORAL at 09:01

## 2019-11-10 RX ADMIN — Medication 6 MG: at 22:45

## 2019-11-10 RX ADMIN — GUAIFENESIN 600 MG: 600 TABLET, EXTENDED RELEASE ORAL at 09:01

## 2019-11-10 RX ADMIN — DOXAZOSIN MESYLATE 4 MG: 4 TABLET ORAL at 21:00

## 2019-11-10 RX ADMIN — METOPROLOL TARTRATE 25 MG: 25 TABLET ORAL at 21:00

## 2019-11-10 ASSESSMENT — PAIN SCALES - GENERAL
PAINLEVEL_OUTOF10: 0

## 2019-11-11 LAB
ANION GAP SERPL CALCULATED.3IONS-SCNC: 14 MEQ/L (ref 8–16)
BUN BLDV-MCNC: 29 MG/DL (ref 7–22)
CALCIUM SERPL-MCNC: 8.6 MG/DL (ref 8.5–10.5)
CHLORIDE BLD-SCNC: 109 MEQ/L (ref 98–111)
CO2: 19 MEQ/L (ref 23–33)
CREAT SERPL-MCNC: 1.4 MG/DL (ref 0.4–1.2)
ERYTHROCYTE [DISTWIDTH] IN BLOOD BY AUTOMATED COUNT: 13.1 % (ref 11.5–14.5)
ERYTHROCYTE [DISTWIDTH] IN BLOOD BY AUTOMATED COUNT: 42.1 FL (ref 35–45)
GFR SERPL CREATININE-BSD FRML MDRD: 48 ML/MIN/1.73M2
GLUCOSE BLD-MCNC: 112 MG/DL (ref 70–108)
HCT VFR BLD CALC: 30.8 % (ref 42–52)
HEMOGLOBIN: 9.5 GM/DL (ref 14–18)
MCH RBC QN AUTO: 28.1 PG (ref 26–33)
MCHC RBC AUTO-ENTMCNC: 30.8 GM/DL (ref 32.2–35.5)
MCV RBC AUTO: 91.1 FL (ref 80–94)
PLATELET # BLD: 144 THOU/MM3 (ref 130–400)
PMV BLD AUTO: 9.4 FL (ref 9.4–12.4)
POTASSIUM SERPL-SCNC: 3.6 MEQ/L (ref 3.5–5.2)
RBC # BLD: 3.38 MILL/MM3 (ref 4.7–6.1)
SODIUM BLD-SCNC: 142 MEQ/L (ref 135–145)
WBC # BLD: 5.9 THOU/MM3 (ref 4.8–10.8)

## 2019-11-11 PROCEDURE — 36415 COLL VENOUS BLD VENIPUNCTURE: CPT

## 2019-11-11 PROCEDURE — 97530 THERAPEUTIC ACTIVITIES: CPT

## 2019-11-11 PROCEDURE — 94760 N-INVAS EAR/PLS OXIMETRY 1: CPT

## 2019-11-11 PROCEDURE — 85027 COMPLETE CBC AUTOMATED: CPT

## 2019-11-11 PROCEDURE — 99232 SBSQ HOSP IP/OBS MODERATE 35: CPT | Performed by: INTERNAL MEDICINE

## 2019-11-11 PROCEDURE — 1200000003 HC TELEMETRY R&B

## 2019-11-11 PROCEDURE — 2580000003 HC RX 258: Performed by: INTERNAL MEDICINE

## 2019-11-11 PROCEDURE — 6370000000 HC RX 637 (ALT 250 FOR IP): Performed by: INTERNAL MEDICINE

## 2019-11-11 PROCEDURE — 6360000002 HC RX W HCPCS: Performed by: INTERNAL MEDICINE

## 2019-11-11 PROCEDURE — 80048 BASIC METABOLIC PNL TOTAL CA: CPT

## 2019-11-11 PROCEDURE — 97110 THERAPEUTIC EXERCISES: CPT

## 2019-11-11 RX ADMIN — HEPARIN SODIUM 5000 UNITS: 5000 INJECTION INTRAVENOUS; SUBCUTANEOUS at 08:50

## 2019-11-11 RX ADMIN — HYOSCYAMINE SULFATE 125 MCG: 0.12 TABLET, ORALLY DISINTEGRATING ORAL at 22:27

## 2019-11-11 RX ADMIN — THERA TABS 1 TABLET: TAB at 08:51

## 2019-11-11 RX ADMIN — PRAVASTATIN SODIUM 80 MG: 80 TABLET ORAL at 22:27

## 2019-11-11 RX ADMIN — PANTOPRAZOLE SODIUM 40 MG: 40 TABLET, DELAYED RELEASE ORAL at 08:50

## 2019-11-11 RX ADMIN — Medication 6 MG: at 22:27

## 2019-11-11 RX ADMIN — METOPROLOL TARTRATE 25 MG: 25 TABLET ORAL at 22:32

## 2019-11-11 RX ADMIN — Medication 500 MG: at 08:51

## 2019-11-11 RX ADMIN — MEGESTROL ACETATE 200 MG: 40 SUSPENSION ORAL at 08:50

## 2019-11-11 RX ADMIN — SODIUM BICARBONATE 1300 MG: 650 TABLET ORAL at 08:51

## 2019-11-11 RX ADMIN — SODIUM CHLORIDE: 9 INJECTION, SOLUTION INTRAVENOUS at 05:52

## 2019-11-11 RX ADMIN — DOXAZOSIN MESYLATE 4 MG: 4 TABLET ORAL at 22:27

## 2019-11-11 RX ADMIN — METOPROLOL TARTRATE 25 MG: 25 TABLET ORAL at 08:51

## 2019-11-11 RX ADMIN — CEFTRIAXONE SODIUM 1 G: 1 INJECTION, POWDER, FOR SOLUTION INTRAMUSCULAR; INTRAVENOUS at 01:13

## 2019-11-11 RX ADMIN — CALCIUM CARBONATE-VITAMIN D TAB 500 MG-200 UNIT 1 TABLET: 500-200 TAB at 08:51

## 2019-11-11 RX ADMIN — DONEPEZIL HYDROCHLORIDE 10 MG: 10 TABLET, FILM COATED ORAL at 22:27

## 2019-11-11 RX ADMIN — FINASTERIDE 5 MG: 5 TABLET, FILM COATED ORAL at 08:51

## 2019-11-11 RX ADMIN — FERROUS SULFATE TAB 325 MG (65 MG ELEMENTAL FE) 325 MG: 325 (65 FE) TAB at 08:51

## 2019-11-11 RX ADMIN — ASPIRIN 81 MG 81 MG: 81 TABLET ORAL at 08:50

## 2019-11-11 RX ADMIN — PANTOPRAZOLE SODIUM 40 MG: 40 TABLET, DELAYED RELEASE ORAL at 16:36

## 2019-11-11 RX ADMIN — HEPARIN SODIUM 5000 UNITS: 5000 INJECTION INTRAVENOUS; SUBCUTANEOUS at 16:30

## 2019-11-11 ASSESSMENT — PAIN SCALES - GENERAL
PAINLEVEL_OUTOF10: 0

## 2019-11-12 PROCEDURE — 1200000003 HC TELEMETRY R&B

## 2019-11-12 PROCEDURE — 2580000003 HC RX 258: Performed by: INTERNAL MEDICINE

## 2019-11-12 PROCEDURE — 6370000000 HC RX 637 (ALT 250 FOR IP): Performed by: INTERNAL MEDICINE

## 2019-11-12 PROCEDURE — 6360000002 HC RX W HCPCS: Performed by: INTERNAL MEDICINE

## 2019-11-12 PROCEDURE — 90792 PSYCH DIAG EVAL W/MED SRVCS: CPT | Performed by: PSYCHIATRY & NEUROLOGY

## 2019-11-12 PROCEDURE — 97110 THERAPEUTIC EXERCISES: CPT

## 2019-11-12 RX ORDER — QUETIAPINE FUMARATE 25 MG/1
25 TABLET, FILM COATED ORAL NIGHTLY PRN
Status: DISCONTINUED | OUTPATIENT
Start: 2019-11-12 | End: 2019-11-13

## 2019-11-12 RX ORDER — QUETIAPINE FUMARATE 25 MG/1
12.5 TABLET, FILM COATED ORAL 2 TIMES DAILY PRN
Status: DISCONTINUED | OUTPATIENT
Start: 2019-11-12 | End: 2019-11-14 | Stop reason: HOSPADM

## 2019-11-12 RX ADMIN — METOPROLOL TARTRATE 25 MG: 25 TABLET ORAL at 21:50

## 2019-11-12 RX ADMIN — Medication 500 MG: at 08:42

## 2019-11-12 RX ADMIN — PRAVASTATIN SODIUM 80 MG: 80 TABLET ORAL at 21:50

## 2019-11-12 RX ADMIN — ASPIRIN 81 MG 81 MG: 81 TABLET ORAL at 08:42

## 2019-11-12 RX ADMIN — GUAIFENESIN 600 MG: 600 TABLET, EXTENDED RELEASE ORAL at 08:42

## 2019-11-12 RX ADMIN — DOXAZOSIN MESYLATE 4 MG: 4 TABLET ORAL at 21:50

## 2019-11-12 RX ADMIN — CALCIUM CARBONATE-VITAMIN D TAB 500 MG-200 UNIT 1 TABLET: 500-200 TAB at 08:42

## 2019-11-12 RX ADMIN — DOCUSATE SODIUM 100 MG: 100 CAPSULE, LIQUID FILLED ORAL at 08:42

## 2019-11-12 RX ADMIN — SODIUM BICARBONATE 1300 MG: 650 TABLET ORAL at 08:42

## 2019-11-12 RX ADMIN — FINASTERIDE 5 MG: 5 TABLET, FILM COATED ORAL at 08:42

## 2019-11-12 RX ADMIN — MEGESTROL ACETATE 200 MG: 40 SUSPENSION ORAL at 08:42

## 2019-11-12 RX ADMIN — SODIUM CHLORIDE: 9 INJECTION, SOLUTION INTRAVENOUS at 10:12

## 2019-11-12 RX ADMIN — HEPARIN SODIUM 5000 UNITS: 5000 INJECTION INTRAVENOUS; SUBCUTANEOUS at 15:21

## 2019-11-12 RX ADMIN — METOPROLOL TARTRATE 25 MG: 25 TABLET ORAL at 08:42

## 2019-11-12 RX ADMIN — OMEGA-3-ACID ETHYL ESTERS 1 CAPSULE: 1 CAPSULE, LIQUID FILLED ORAL at 08:42

## 2019-11-12 RX ADMIN — DONEPEZIL HYDROCHLORIDE 10 MG: 10 TABLET, FILM COATED ORAL at 21:50

## 2019-11-12 RX ADMIN — FERROUS SULFATE TAB 325 MG (65 MG ELEMENTAL FE) 325 MG: 325 (65 FE) TAB at 08:42

## 2019-11-12 RX ADMIN — THERA TABS 1 TABLET: TAB at 08:42

## 2019-11-12 RX ADMIN — CEFTRIAXONE SODIUM 1 G: 1 INJECTION, POWDER, FOR SOLUTION INTRAMUSCULAR; INTRAVENOUS at 01:17

## 2019-11-12 ASSESSMENT — PAIN SCALES - GENERAL
PAINLEVEL_OUTOF10: 1
PAINLEVEL_OUTOF10: 0

## 2019-11-12 ASSESSMENT — PAIN SCALES - WONG BAKER
WONGBAKER_NUMERICALRESPONSE: 0

## 2019-11-13 PROBLEM — R62.7 FAILURE TO THRIVE IN ADULT: Status: ACTIVE | Noted: 2019-11-13

## 2019-11-13 PROBLEM — E87.0 HYPERNATREMIA: Status: ACTIVE | Noted: 2019-11-13

## 2019-11-13 PROCEDURE — 1200000003 HC TELEMETRY R&B

## 2019-11-13 PROCEDURE — 6370000000 HC RX 637 (ALT 250 FOR IP): Performed by: INTERNAL MEDICINE

## 2019-11-13 PROCEDURE — 94760 N-INVAS EAR/PLS OXIMETRY 1: CPT

## 2019-11-13 RX ADMIN — DOXAZOSIN MESYLATE 4 MG: 4 TABLET ORAL at 20:54

## 2019-11-13 RX ADMIN — METOPROLOL TARTRATE 25 MG: 25 TABLET ORAL at 20:52

## 2019-11-13 RX ADMIN — METOPROLOL TARTRATE 25 MG: 25 TABLET ORAL at 10:43

## 2019-11-13 RX ADMIN — MEGESTROL ACETATE 200 MG: 40 SUSPENSION ORAL at 10:45

## 2019-11-13 ASSESSMENT — PAIN SCALES - WONG BAKER
WONGBAKER_NUMERICALRESPONSE: 0

## 2019-11-13 ASSESSMENT — PAIN SCALES - GENERAL
PAINLEVEL_OUTOF10: 3
PAINLEVEL_OUTOF10: 0

## 2019-11-13 ASSESSMENT — PAIN DESCRIPTION - LOCATION
LOCATION: ABDOMEN
LOCATION: ABDOMEN

## 2019-11-13 ASSESSMENT — PAIN DESCRIPTION - DESCRIPTORS: DESCRIPTORS: DISCOMFORT

## 2019-11-13 ASSESSMENT — PAIN DESCRIPTION - PAIN TYPE
TYPE: ACUTE PAIN
TYPE: ACUTE PAIN

## 2019-11-13 ASSESSMENT — PAIN DESCRIPTION - FREQUENCY: FREQUENCY: INTERMITTENT

## 2019-11-13 ASSESSMENT — PAIN DESCRIPTION - ORIENTATION: ORIENTATION: MID

## 2019-11-14 VITALS
OXYGEN SATURATION: 94 % | RESPIRATION RATE: 18 BRPM | TEMPERATURE: 98.2 F | HEIGHT: 65 IN | DIASTOLIC BLOOD PRESSURE: 64 MMHG | HEART RATE: 75 BPM | WEIGHT: 157.9 LBS | BODY MASS INDEX: 26.31 KG/M2 | SYSTOLIC BLOOD PRESSURE: 132 MMHG

## 2019-11-14 PROCEDURE — 6370000000 HC RX 637 (ALT 250 FOR IP): Performed by: INTERNAL MEDICINE

## 2019-11-14 PROCEDURE — 2580000003 HC RX 258: Performed by: INTERNAL MEDICINE

## 2019-11-14 RX ORDER — MEGESTROL ACETATE 40 MG/ML
200 SUSPENSION ORAL 2 TIMES DAILY
Qty: 240 ML | Refills: 3 | Status: SHIPPED | OUTPATIENT
Start: 2019-11-14

## 2019-11-14 RX ADMIN — ASPIRIN 81 MG 81 MG: 81 TABLET ORAL at 10:51

## 2019-11-14 RX ADMIN — SODIUM CHLORIDE: 9 INJECTION, SOLUTION INTRAVENOUS at 09:07

## 2019-11-14 RX ADMIN — FINASTERIDE 5 MG: 5 TABLET, FILM COATED ORAL at 10:51

## 2019-11-14 RX ADMIN — METOPROLOL TARTRATE 25 MG: 25 TABLET ORAL at 10:50

## 2019-11-14 RX ADMIN — MEGESTROL ACETATE 200 MG: 40 SUSPENSION ORAL at 10:51

## 2019-12-05 PROBLEM — E86.0 DEHYDRATION: Status: RESOLVED | Noted: 2019-11-05 | Resolved: 2019-12-05

## 2019-12-13 ENCOUNTER — TELEPHONE (OUTPATIENT)
Dept: FAMILY MEDICINE CLINIC | Age: 84
End: 2019-12-13

## 2019-12-13 DIAGNOSIS — S42.291A HUMERUS HEAD FRACTURE, RIGHT, CLOSED, INITIAL ENCOUNTER: ICD-10-CM

## 2019-12-13 DIAGNOSIS — Z51.5 PALLIATIVE CARE ENCOUNTER: Primary | ICD-10-CM

## 2019-12-13 DIAGNOSIS — Z51.5 HOSPICE CARE PATIENT: ICD-10-CM

## 2019-12-13 DIAGNOSIS — G89.29 OTHER CHRONIC PAIN: ICD-10-CM

## 2019-12-13 RX ORDER — MORPHINE SULFATE 100 MG/5ML
5 SOLUTION ORAL EVERY 4 HOURS PRN
Qty: 15 ML | Refills: 0 | Status: SHIPPED | OUTPATIENT
Start: 2019-12-13 | End: 2019-12-16

## 2019-12-13 RX ORDER — LIDOCAINE 50 MG/G
3 PATCH TOPICAL EVERY 24 HOURS
Qty: 12 PATCH | Refills: 0 | Status: SHIPPED | OUTPATIENT
Start: 2019-12-13